# Patient Record
Sex: FEMALE | Race: WHITE | NOT HISPANIC OR LATINO | Employment: OTHER | URBAN - METROPOLITAN AREA
[De-identification: names, ages, dates, MRNs, and addresses within clinical notes are randomized per-mention and may not be internally consistent; named-entity substitution may affect disease eponyms.]

---

## 2020-04-21 PROBLEM — K64.8 OTHER HEMORRHOIDS: Status: ACTIVE | Noted: 2020-04-21

## 2020-05-05 ENCOUNTER — TELEPHONE (OUTPATIENT)
Dept: SCHEDULING | Facility: IMAGING CENTER | Age: 75
End: 2020-05-05

## 2020-05-07 ENCOUNTER — OFFICE VISIT (OUTPATIENT)
Dept: MEDICAL GROUP | Facility: PHYSICIAN GROUP | Age: 75
End: 2020-05-07
Payer: MEDICARE

## 2020-05-07 ENCOUNTER — HOSPITAL ENCOUNTER (OUTPATIENT)
Dept: CARDIOLOGY | Facility: MEDICAL CENTER | Age: 75
End: 2020-05-07
Attending: NURSE PRACTITIONER
Payer: MEDICARE

## 2020-05-07 ENCOUNTER — HOSPITAL ENCOUNTER (OUTPATIENT)
Dept: LAB | Facility: MEDICAL CENTER | Age: 75
End: 2020-05-07
Attending: NURSE PRACTITIONER
Payer: MEDICARE

## 2020-05-07 VITALS
RESPIRATION RATE: 16 BRPM | HEIGHT: 67 IN | OXYGEN SATURATION: 95 % | TEMPERATURE: 98.8 F | WEIGHT: 135.3 LBS | DIASTOLIC BLOOD PRESSURE: 62 MMHG | HEART RATE: 85 BPM | SYSTOLIC BLOOD PRESSURE: 116 MMHG | BODY MASS INDEX: 21.24 KG/M2

## 2020-05-07 DIAGNOSIS — Z91.199 NONCOMPLIANCE WITH TREATMENT PLAN: ICD-10-CM

## 2020-05-07 DIAGNOSIS — I48.91 ATRIAL FIBRILLATION, UNSPECIFIED TYPE (HCC): ICD-10-CM

## 2020-05-07 DIAGNOSIS — F41.8 ANXIETY ABOUT HEALTH: ICD-10-CM

## 2020-05-07 DIAGNOSIS — E55.9 VITAMIN D DEFICIENCY: ICD-10-CM

## 2020-05-07 DIAGNOSIS — E78.5 DYSLIPIDEMIA: ICD-10-CM

## 2020-05-07 DIAGNOSIS — I34.1 MITRAL PROLAPSE: ICD-10-CM

## 2020-05-07 DIAGNOSIS — K64.8 OTHER HEMORRHOIDS: ICD-10-CM

## 2020-05-07 DIAGNOSIS — R11.0 NAUSEA: ICD-10-CM

## 2020-05-07 DIAGNOSIS — R73.09 ELEVATED HEMOGLOBIN A1C: ICD-10-CM

## 2020-05-07 LAB
25(OH)D3 SERPL-MCNC: 31 NG/ML (ref 30–100)
ALBUMIN SERPL BCP-MCNC: 4.6 G/DL (ref 3.2–4.9)
ALBUMIN/GLOB SERPL: 1.7 G/DL
ALP SERPL-CCNC: 74 U/L (ref 30–99)
ALT SERPL-CCNC: 17 U/L (ref 2–50)
ANION GAP SERPL CALC-SCNC: 13 MMOL/L (ref 7–16)
AST SERPL-CCNC: 18 U/L (ref 12–45)
BASOPHILS # BLD AUTO: 0.6 % (ref 0–1.8)
BASOPHILS # BLD: 0.04 K/UL (ref 0–0.12)
BILIRUB SERPL-MCNC: 0.7 MG/DL (ref 0.1–1.5)
BUN SERPL-MCNC: 8 MG/DL (ref 8–22)
CALCIUM SERPL-MCNC: 9.5 MG/DL (ref 8.5–10.5)
CHLORIDE SERPL-SCNC: 100 MMOL/L (ref 96–112)
CHOLEST SERPL-MCNC: 213 MG/DL (ref 100–199)
CO2 SERPL-SCNC: 23 MMOL/L (ref 20–33)
CREAT SERPL-MCNC: 0.8 MG/DL (ref 0.5–1.4)
EOSINOPHIL # BLD AUTO: 0.03 K/UL (ref 0–0.51)
EOSINOPHIL NFR BLD: 0.4 % (ref 0–6.9)
ERYTHROCYTE [DISTWIDTH] IN BLOOD BY AUTOMATED COUNT: 42.6 FL (ref 35.9–50)
EST. AVERAGE GLUCOSE BLD GHB EST-MCNC: 111 MG/DL
GLOBULIN SER CALC-MCNC: 2.7 G/DL (ref 1.9–3.5)
GLUCOSE SERPL-MCNC: 97 MG/DL (ref 65–99)
HBA1C MFR BLD: 5.5 % (ref 0–5.6)
HCT VFR BLD AUTO: 45.9 % (ref 37–47)
HDLC SERPL-MCNC: 61 MG/DL
HGB BLD-MCNC: 14.9 G/DL (ref 12–16)
IMM GRANULOCYTES # BLD AUTO: 0.01 K/UL (ref 0–0.11)
IMM GRANULOCYTES NFR BLD AUTO: 0.1 % (ref 0–0.9)
LDLC SERPL CALC-MCNC: 133 MG/DL
LV EJECT FRACT  99904: 65
LV EJECT FRACT MOD 2C 99903: 74.13
LV EJECT FRACT MOD 4C 99902: 67.74
LV EJECT FRACT MOD BP 99901: 69.86
LYMPHOCYTES # BLD AUTO: 1.45 K/UL (ref 1–4.8)
LYMPHOCYTES NFR BLD: 21 % (ref 22–41)
MCH RBC QN AUTO: 29.4 PG (ref 27–33)
MCHC RBC AUTO-ENTMCNC: 32.5 G/DL (ref 33.6–35)
MCV RBC AUTO: 90.7 FL (ref 81.4–97.8)
MONOCYTES # BLD AUTO: 0.35 K/UL (ref 0–0.85)
MONOCYTES NFR BLD AUTO: 5.1 % (ref 0–13.4)
NEUTROPHILS # BLD AUTO: 5.02 K/UL (ref 2–7.15)
NEUTROPHILS NFR BLD: 72.8 % (ref 44–72)
NRBC # BLD AUTO: 0 K/UL
NRBC BLD-RTO: 0 /100 WBC
PLATELET # BLD AUTO: 152 K/UL (ref 164–446)
PMV BLD AUTO: 13.5 FL (ref 9–12.9)
POTASSIUM SERPL-SCNC: 3.7 MMOL/L (ref 3.6–5.5)
PROT SERPL-MCNC: 7.3 G/DL (ref 6–8.2)
RBC # BLD AUTO: 5.06 M/UL (ref 4.2–5.4)
SODIUM SERPL-SCNC: 136 MMOL/L (ref 135–145)
T4 FREE SERPL-MCNC: 1.51 NG/DL (ref 0.93–1.7)
TRIGL SERPL-MCNC: 97 MG/DL (ref 0–149)
TSH SERPL DL<=0.005 MIU/L-ACNC: 3.49 UIU/ML (ref 0.38–5.33)
WBC # BLD AUTO: 6.9 K/UL (ref 4.8–10.8)

## 2020-05-07 PROCEDURE — 83036 HEMOGLOBIN GLYCOSYLATED A1C: CPT | Mod: GA

## 2020-05-07 PROCEDURE — 84443 ASSAY THYROID STIM HORMONE: CPT

## 2020-05-07 PROCEDURE — 82306 VITAMIN D 25 HYDROXY: CPT

## 2020-05-07 PROCEDURE — 80053 COMPREHEN METABOLIC PANEL: CPT

## 2020-05-07 PROCEDURE — 80061 LIPID PANEL: CPT

## 2020-05-07 PROCEDURE — 84439 ASSAY OF FREE THYROXINE: CPT

## 2020-05-07 PROCEDURE — 93306 TTE W/DOPPLER COMPLETE: CPT

## 2020-05-07 PROCEDURE — 36415 COLL VENOUS BLD VENIPUNCTURE: CPT

## 2020-05-07 PROCEDURE — 99205 OFFICE O/P NEW HI 60 MIN: CPT | Performed by: NURSE PRACTITIONER

## 2020-05-07 PROCEDURE — 85025 COMPLETE CBC W/AUTO DIFF WBC: CPT

## 2020-05-07 PROCEDURE — 93306 TTE W/DOPPLER COMPLETE: CPT | Mod: 26 | Performed by: INTERNAL MEDICINE

## 2020-05-07 ASSESSMENT — PATIENT HEALTH QUESTIONNAIRE - PHQ9: CLINICAL INTERPRETATION OF PHQ2 SCORE: 0

## 2020-05-08 ENCOUNTER — TELEPHONE (OUTPATIENT)
Dept: MEDICAL GROUP | Facility: PHYSICIAN GROUP | Age: 75
End: 2020-05-08

## 2020-05-08 NOTE — TELEPHONE ENCOUNTER
PT called and stated that cardiology wouldn't let them both go in for the appointment so they chose to schedule a different appointment, she would like you to call her back to discuss this.

## 2020-05-08 NOTE — ASSESSMENT & PLAN NOTE
New pt. and  report previous noncompliance with medical treatment plans.  No PCP prior to today, no previous maintenance screening per patient choice.  Not taking any medications or supplements.

## 2020-05-08 NOTE — ASSESSMENT & PLAN NOTE
New to me.  Pleasant 75-year-old female with complicated medical history and main concern of persistent nausea x10 days, no vomiting.  Associated symptoms are weakness and she has not been eating much in the last week.  However tolerating fluids well. She denies CP, palpitations, dyspnea, coughing, wheezing, dizziness, ankle and feet swelling.  On 4/21/20 evaluation for external hemorrhoids, Hx of constipation, resolved now due to increase in OTC fiber and daily liquid intake.  She admits to frequent flatulence. Denies rectal bleeding, abd. distention, tenderness. She is here with  to help with history.  Pt is a poor historian, anxious today as she avoids medical offices and won't go to the office unless she absolutely has to. Underlined stress and anxiety since loss of grandchild. Good partner and family support. No PCP in the last 6 years as patient states she had no issues. She had no maintenance screening done by choice.   reports pt's initial diagnosis of mitral prolapse and atrial fibrillation x6 yrs ago, and recent cardiac evaluation in November 2019 by [facility] cardiology.  Patient refused to take Rx'd medications as she read Side Effects list online made her cautious.  She has not followed up with cardiology and was managing her dx with healthy diet and stress control.  Patient declines providing the name of a cardiologist for records request, as she does not want to hurt his feelings, however would like a new doctor if she needs to see one.   Vitals today VSS.  EKG in the office reveals sinus rhythm rate of 77, normal P axis normal.  , QRS 91, QT is 397 QTc is 450.   She denies Fever, chills, unplanned wt. Loss.

## 2020-05-12 NOTE — PROGRESS NOTES
Subjective:   Chief Complaint:   Chief Complaint   Patient presents with   • Atrial Fibrillation       This office visit is taking place via video conference in the setting of the COVID-19 pandemic.    Frances Sanchez is a 75 y.o. female who is referred by JOSE A Lara for atrial fibrillation, mitral valve prolapse with moderate to severe mitral regurgitation.    A few weeks ago, was having daily nausea, getting better.  Notes her pulse in her ear, throat and sternum is irregular at times.  Has palpitations at times, not clear if afib, feels like is it beating forcefully, fast, then slow, lasts a few hours, then stop. Has not happened for the past 2 months.  Arms get tingling, face tingles.    Has had heart murmur, was told would need surgery at some point.    Previously had paroxysmal atrial fibrillation around 2014 at [facility], last seen by them November 2019. Dr. Samayoa, wore a monitor in 2019. She recalls  Was concerned about taking recommended medications.  Chads 2 vascular score of 2, 2, previously suggested Xarelto, but then was on ASA, is not taking it until 3 days ago, takes with milk, did not tolerate  Has had tinnitus, not new, not related to ASA.    Echocardiogram 5/7/2020 with prolapse of the posterior leaflet with moderate to severe mitral regurgitation, RVSP 35.    Has hyperlipidemia, .  Has vitamin D deficiency, back in normal range.  Has had elevated A1c in the past but not recent.    She is not limited by chest pain, pressure or tightness with activity.   She can walk up/down stairs and walk one block, feels mild DAWKINS.  No orthopnea or lower extremity swelling.   Some mild lightheadedness.   No presyncope/syncope.   No symptoms of leg claudication.   No stroke/TIA like symptoms.    No family history of premature coronary artery disease.  No prior smoking history.  No history of diabetes.  No history of hypertension. BP up today, she is nervious, prior BP has been ok (reviewed by me)  No  history of autoimmune disease such as lupus or rheumatoid arthritis.  No chronic kidney disease.    Here with her , Aleksandar.    DATA REVIEWED by me:  ECG 5/7/2020  Sinus, 77, borderline anterior ST depression    Echo 5/7/2020  No prior study is available for comparison.   Left ventricular ejection fraction is visually estimated to be 65%.  Normal regional wall motion.  Prolapse of the posterior mitral leaflet was present.  Moderate appearing, likely severe anterior directed  mitral   regurgitation.  Estimated right ventricular systolic pressure  is 35 mmHg.    Most recent labs:       Lab Results   Component Value Date/Time    HEMOGLOBIN 14.9 05/07/2020 11:55 AM    HEMATOCRIT 45.9 05/07/2020 11:55 AM    MCV 90.7 05/07/2020 11:55 AM      Lab Results   Component Value Date/Time    SODIUM 136 05/07/2020 11:55 AM    POTASSIUM 3.7 05/07/2020 11:55 AM    CHLORIDE 100 05/07/2020 11:55 AM    CO2 23 05/07/2020 11:55 AM    GLUCOSE 97 05/07/2020 11:55 AM    BUN 8 05/07/2020 11:55 AM    CREATININE 0.80 05/07/2020 11:55 AM      Lab Results   Component Value Date/Time    ASTSGOT 18 05/07/2020 11:55 AM    ALTSGPT 17 05/07/2020 11:55 AM    ALBUMIN 4.6 05/07/2020 11:55 AM      Lab Results   Component Value Date/Time    CHOLSTRLTOT 213 (H) 05/07/2020 11:55 AM     (H) 05/07/2020 11:55 AM    HDL 61 05/07/2020 11:55 AM    TRIGLYCERIDE 97 05/07/2020 11:55 AM           Past Medical History:   Diagnosis Date   • Heart murmur 2019    mitral valve prolapse     History reviewed. No pertinent surgical history.  Family History   Problem Relation Age of Onset   • Alzheimer's Disease Father    • Diabetes Maternal Grandmother      Social History     Socioeconomic History   • Marital status:      Spouse name: Not on file   • Number of children: Not on file   • Years of education: Not on file   • Highest education level: Not on file   Occupational History   • Not on file   Social Needs   • Financial resource strain: Not on file   •  "Food insecurity     Worry: Not on file     Inability: Not on file   • Transportation needs     Medical: Not on file     Non-medical: Not on file   Tobacco Use   • Smoking status: Never Smoker   • Smokeless tobacco: Never Used   Substance and Sexual Activity   • Alcohol use: No   • Drug use: Never   • Sexual activity: Yes     Partners: Male   Lifestyle   • Physical activity     Days per week: Not on file     Minutes per session: Not on file   • Stress: Not on file   Relationships   • Social connections     Talks on phone: Not on file     Gets together: Not on file     Attends [employer] service: Not on file     Active member of club or organization: Not on file     Attends meetings of clubs or organizations: Not on file     Relationship status: Not on file   • Intimate partner violence     Fear of current or ex partner: Not on file     Emotionally abused: Not on file     Physically abused: Not on file     Forced sexual activity: Not on file   Other Topics Concern   • Not on file   Social History Narrative   • Not on file     Allergies   Allergen Reactions   • Keflex      Pt unsure of reaction       Current Outpatient Medications   Medication Sig Dispense Refill   • furosemide (LASIX) 20 MG Tab Take 1 Tab by mouth every day. 30 Tab 11   • potassium chloride (KLOR-CON) 8 MEQ tablet Take 1 Tab by mouth 2 times a day. 30 Tab 11     No current facility-administered medications for this visit.        ROS  All others systems reviewed and negative.     Objective:     /84 (BP Location: Left arm, Patient Position: Sitting)   Pulse 84   Ht 1.702 m (5' 7\")   Wt 61.2 kg (135 lb)  Body mass index is 21.14 kg/m².    This encounter was conducted via Zoom.  Verbal consent was obtained. Patient's identity was verified.    Vitals obtained by patient:    Physical Exam:  General: No acute distress. Well nourished.   HEENT: EOM grossly intact, no scleral icterus, no pharyngeal erythema.   Neck:  No JVD noted at 90 degrees, trachea " midline  CVS: Pulse as reported by patient, no visible LE edema.  Resp: Unlabored respiratory effort, no cough or audible wheeze  MSK/Ext: No clubbing or cyanosis visible appreciated.  Skin: No rashes in visible areas.  Neurological: CN III-XII grossly intact. No focal deficits.   Psych: A&O x 3, appropriate affect, good judgement, well groomed        Assessment:     1. Paroxysmal atrial fibrillation (HCC)  Basic Metabolic Panel   2. Mitral prolapse  REFERRAL TO CARDIOTHORACIC SURGERY   3. Non-rheumatic mitral regurgitation  REFERRAL TO CARDIOTHORACIC SURGERY    Basic Metabolic Panel   4. Pure hypercholesterolemia     5. Other secondary pulmonary hypertension (HCC)     6. DAWKINS (dyspnea on exertion)     7. Palpitations     8. Lightheaded         Medical Decision Making:  Today's Assessment / Status / Plan:     -Having symptomatic afib, lasts a few hours. We discussed metoprolol and other options  -Anticipate eventual need for diuretics with moderate to severe mitral regurgitation, she would rather discuss surgery first at this time, does have DAWKINS, no alarming features on her echo  -Chads 2 vascular score is 2 at this time, aspirin 162 mg at this time  -Discussed s/sx of ACS.  -Monitor blood pressure, I do not think it is typically elevated  -Nausea could be related but not clear, see below  -Eleveted LDL, no statin at this time  -RTC 4 weeks    Written instructions given today:      -Medication like metoprolol can suppress the palpitations and make the afib burden lessen.  -[id number] score 2, annual risk of stroke is 2.2%,  mg daily for now, eventually when your score is 3, probably when you turn 77, I will recommend full blood thinner such as Xarelto.  Your risk of stroke is never zero. Please know the signs of stroke, it is a 911 emergency.    -When your mitral valve leaks severely and the right heart pressures start to climb, and the heart pump function drops below 65%, it will be time to have a repair.  We  try diuretics first to delay surgery.  -If you are wondering if the mitral regurgitation is a cause of your nausea then trying lasix 20 mg daily with potassium 8 meq daily to reduce the amount of blood volume would be the best way to correlate if they are related.  -Start taking furosmedie in the morning of afternoon, lasts 6 hours, if you feel worse, dizzy, lightheaded, fatigue, stop the medication and call me.  -Write down your weight day, if you lose more than 2-3 pounds over 2 weeks, call me  -non fasting blood work in 5 days after starting, any [facility] lab  -If you think the palpitations and afib are related, taking low dose metoprolol is the best way to see if this will improve.    -You would be a candidate for mitral valve repair, we can refer you to Dr. Jeronimo Bryan at [facility] and other locations per your request.    Return in about 4 weeks (around 6/10/2020).    It is my pleasure to participate in the care of Ms. Sanchez.  Please do not hesitate to contact me with questions or concerns.    Roshni Gimenez MD, [facility]  Cardiologist [facility]    Please note that this dictation was created using voice recognition software. I have made every reasonable attempt to correct obvious errors, but it is possible there are errors of grammar and possibly content that I did not discover before finalizing the note.

## 2020-05-13 ENCOUNTER — TELEMEDICINE (OUTPATIENT)
Dept: CARDIOLOGY | Facility: MEDICAL CENTER | Age: 75
End: 2020-05-13
Payer: MEDICARE

## 2020-05-13 VITALS
SYSTOLIC BLOOD PRESSURE: 153 MMHG | BODY MASS INDEX: 21.19 KG/M2 | HEART RATE: 84 BPM | WEIGHT: 135 LBS | DIASTOLIC BLOOD PRESSURE: 84 MMHG | HEIGHT: 67 IN

## 2020-05-13 DIAGNOSIS — I27.29 OTHER SECONDARY PULMONARY HYPERTENSION (HCC): ICD-10-CM

## 2020-05-13 DIAGNOSIS — R06.09 DOE (DYSPNEA ON EXERTION): ICD-10-CM

## 2020-05-13 DIAGNOSIS — I34.1 MITRAL PROLAPSE: ICD-10-CM

## 2020-05-13 DIAGNOSIS — R42 LIGHTHEADED: ICD-10-CM

## 2020-05-13 DIAGNOSIS — I48.0 PAROXYSMAL ATRIAL FIBRILLATION (HCC): ICD-10-CM

## 2020-05-13 DIAGNOSIS — E78.00 PURE HYPERCHOLESTEROLEMIA: ICD-10-CM

## 2020-05-13 DIAGNOSIS — R00.2 PALPITATIONS: ICD-10-CM

## 2020-05-13 DIAGNOSIS — I34.0 NON-RHEUMATIC MITRAL REGURGITATION: ICD-10-CM

## 2020-05-13 PROCEDURE — 99214 OFFICE O/P EST MOD 30 MIN: CPT | Mod: 95,CR | Performed by: INTERNAL MEDICINE

## 2020-05-13 RX ORDER — POTASSIUM CHLORIDE 600 MG/1
8 TABLET, FILM COATED, EXTENDED RELEASE ORAL 2 TIMES DAILY
Qty: 30 TAB | Refills: 11 | Status: SHIPPED | OUTPATIENT
Start: 2020-05-13 | End: 2020-05-22

## 2020-05-13 RX ORDER — FUROSEMIDE 20 MG/1
20 TABLET ORAL DAILY
Qty: 30 TAB | Refills: 11 | Status: SHIPPED | OUTPATIENT
Start: 2020-05-13 | End: 2020-05-22

## 2020-05-13 ASSESSMENT — FIBROSIS 4 INDEX: FIB4 SCORE: 2.1

## 2020-05-13 NOTE — LETTER
[facility]   [address]  [place] [address]  Phone: [phone]  Fax: [phone]              Frances Sanchez  1945    Encounter Date: 5/13/2020    Roshni Gimenez M.D.          PROGRESS NOTE:  Subjective:   Chief Complaint:   Chief Complaint   Patient presents with   • Atrial Fibrillation       This office visit is taking place via video conference in the setting of the COVID-19 pandemic.    Frances Sanchez is a 75 y.o. female who is referred by JOSE A Lara for atrial fibrillation, mitral valve prolapse with moderate to severe mitral regurgitation.    A few weeks ago, was having daily nausea, getting better.  Notes her pulse in her ear, throat and sternum is irregular at times.  Has palpitations at times, not clear if afib, feels like is it beating forcefully, fast, then slow, lasts a few hours, then stop. Has not happened for the past 2 months.  Arms get tingling, face tingles.    Has had heart murmur, was told would need surgery at some point.    Previously had paroxysmal atrial fibrillation around 2014 at [facility], last seen by them November 2019. Dr. Samayoa, wore a monitor in 2019. She recalls  Was concerned about taking recommended medications.  Chads 2 vascular score of 2, 2, previously suggested Xarelto, but then was on ASA, is not taking it until 3 days ago, takes with milk, did not tolerate  Has had tinnitus, not new, not related to ASA.    Echocardiogram 5/7/2020 with prolapse of the posterior leaflet with moderate to severe mitral regurgitation, RVSP 35.    Has hyperlipidemia, .  Has vitamin D deficiency, back in normal range.  Has had elevated A1c in the past but not recent.    She is not limited by chest pain, pressure or tightness with activity.   She can walk up/down stairs and walk one block, feels mild DAWKINS.  No orthopnea or lower extremity swelling.   Some mild lightheadedness.   No presyncope/syncope.   No symptoms of  leg claudication.   No stroke/TIA like symptoms.    No family history of premature coronary artery disease.  No prior smoking history.  No history of diabetes.  No history of hypertension. BP up today, she is nervious, prior BP has been ok (reviewed by me)  No history of autoimmune disease such as lupus or rheumatoid arthritis.  No chronic kidney disease.    Here with her , Aleksandar.    DATA REVIEWED by me:  ECG 5/7/2020  Sinus, 77, borderline anterior ST depression    Echo 5/7/2020  No prior study is available for comparison.   Left ventricular ejection fraction is visually estimated to be 65%.  Normal regional wall motion.  Prolapse of the posterior mitral leaflet was present.  Moderate appearing, likely severe anterior directed  mitral   regurgitation.  Estimated right ventricular systolic pressure  is 35 mmHg.    Most recent labs:       Lab Results   Component Value Date/Time    HEMOGLOBIN 14.9 05/07/2020 11:55 AM    HEMATOCRIT 45.9 05/07/2020 11:55 AM    MCV 90.7 05/07/2020 11:55 AM      Lab Results   Component Value Date/Time    SODIUM 136 05/07/2020 11:55 AM    POTASSIUM 3.7 05/07/2020 11:55 AM    CHLORIDE 100 05/07/2020 11:55 AM    CO2 23 05/07/2020 11:55 AM    GLUCOSE 97 05/07/2020 11:55 AM    BUN 8 05/07/2020 11:55 AM    CREATININE 0.80 05/07/2020 11:55 AM      Lab Results   Component Value Date/Time    ASTSGOT 18 05/07/2020 11:55 AM    ALTSGPT 17 05/07/2020 11:55 AM    ALBUMIN 4.6 05/07/2020 11:55 AM      Lab Results   Component Value Date/Time    CHOLSTRLTOT 213 (H) 05/07/2020 11:55 AM     (H) 05/07/2020 11:55 AM    HDL 61 05/07/2020 11:55 AM    TRIGLYCERIDE 97 05/07/2020 11:55 AM           Past Medical History:   Diagnosis Date   • Heart murmur 2019    mitral valve prolapse     History reviewed. No pertinent surgical history.  Family History   Problem Relation Age of Onset   • Alzheimer's Disease Father    • Diabetes Maternal Grandmother      Social History     Socioeconomic History   •  "Marital status:      Spouse name: Not on file   • Number of children: Not on file   • Years of education: Not on file   • Highest education level: Not on file   Occupational History   • Not on file   Social Needs   • Financial resource strain: Not on file   • Food insecurity     Worry: Not on file     Inability: Not on file   • Transportation needs     Medical: Not on file     Non-medical: Not on file   Tobacco Use   • Smoking status: Never Smoker   • Smokeless tobacco: Never Used   Substance and Sexual Activity   • Alcohol use: No   • Drug use: Never   • Sexual activity: Yes     Partners: Male   Lifestyle   • Physical activity     Days per week: Not on file     Minutes per session: Not on file   • Stress: Not on file   Relationships   • Social connections     Talks on phone: Not on file     Gets together: Not on file     Attends [employer] service: Not on file     Active member of club or organization: Not on file     Attends meetings of clubs or organizations: Not on file     Relationship status: Not on file   • Intimate partner violence     Fear of current or ex partner: Not on file     Emotionally abused: Not on file     Physically abused: Not on file     Forced sexual activity: Not on file   Other Topics Concern   • Not on file   Social History Narrative   • Not on file     Allergies   Allergen Reactions   • Keflex      Pt unsure of reaction       No current outpatient medications on file.     No current facility-administered medications for this visit.        ROS  All others systems reviewed and negative.     Objective:     /84 (BP Location: Left arm, Patient Position: Sitting)   Pulse 84   Ht 1.702 m (5' 7\")   Wt 61.2 kg (135 lb)  Body mass index is 21.14 kg/m².    This encounter was conducted via Zoom.  Verbal consent was obtained. Patient's identity was verified.    Vitals obtained by patient:    Physical Exam:  General: No acute distress. Well nourished.   HEENT: EOM grossly intact, no " scleral icterus, no pharyngeal erythema.   Neck:  No JVD noted at 90 degrees, trachea midline  CVS: Pulse as reported by patient, no visible LE edema.  Resp: Unlabored respiratory effort, no cough or audible wheeze  MSK/Ext: No clubbing or cyanosis visible appreciated.  Skin: No rashes in visible areas.  Neurological: CN III-XII grossly intact. No focal deficits.   Psych: A&O x 3, appropriate affect, good judgement, well groomed        Assessment:     1. Paroxysmal atrial fibrillation (HCC)     2. Mitral prolapse  REFERRAL TO CARDIOTHORACIC SURGERY   3. Non-rheumatic mitral regurgitation  REFERRAL TO CARDIOTHORACIC SURGERY   4. Pure hypercholesterolemia     5. Other secondary pulmonary hypertension (HCC)     6. DAWKINS (dyspnea on exertion)     7. Palpitations     8. Lightheaded         Medical Decision Making:  Today's Assessment / Status / Plan:     -Having symptomatic afib, lasts a few hours. We discussed metoprolol and other options  -Anticipate eventual need for diuretics with moderate to severe mitral regurgitation, she would rather discuss surgery first at this time, does have DAWKINS, no alarming features on her echo  -Chads 2 vascular score is 2 at this time, aspirin 162 mg at this time  -Discussed s/sx of ACS.  -Monitor blood pressure, I do not think it is typically elevated  -Nausea could be related but not clear, see below  -Eleveted LDL, no statin at this time  -RTC 4 weeks    Written instructions given today:      -Medication like metoprolol can suppress the palpitations and make the afib burden lessen.  -[id number] score 2, annual risk of stroke is 2.2%,  mg daily for now, eventually when your score is 3, probably when you turn 77, I will recommend full blood thinner such as Xarelto.  Your risk of stroke is never zero. Please know the signs of stroke, it is a 911 emergency.    -When your mitral valve leaks severely and the right heart pressures start to climb, and the heart pump function drops below  65%, it will be time to have a repair.  We try diuretics first to delay surgery.  -If you are wondering if the mitral regurgitation is a cause of your nausea then trying lasix 20 mg daily with potassium 8 meq daily to reduce the amount of blood volume would be the best way to correlate if they are related.  -Start taking furosmedie in the morning of afternoon, lasts 6 hours, if you feel worse, dizzy, lightheaded, fatigue, stop the medication and call me.  -Write down your weight day, if you lose more than 2-3 pounds over 2 weeks, call me  -non fasting blood work in 5 days after starting, any [facility] lab  -If you think the palpitations and afib are related, taking low dose metoprolol is the best way to see if this will improve.    -You would be a candidate for mitral valve repair, we can refer you to Dr. Jeronimo Bryan at [facility] and other locations per your request.    Return in about 4 weeks (around 6/10/2020).    It is my pleasure to participate in the care of Ms. Sanchez.  Please do not hesitate to contact me with questions or concerns.    Roshni Gimenez MD, [facility]  Cardiologist [facility]    Please note that this dictation was created using voice recognition software. I have made every reasonable attempt to correct obvious errors, but it is possible there are errors of grammar and possibly content that I did not discover before finalizing the note.      PARK Baez  [address]  [place] [address]  VIA In Basket     Jeronimo Bryan M.D.  [address]  [address]  [place] [address]  VIA In Basket

## 2020-05-13 NOTE — PATIENT INSTRUCTIONS
-Medication like metoprolol can suppress the palpitations and make the afib burden lessen.  -FJMBX9incy score 2, annual risk of stroke is 2.2%,  mg daily for now, eventually when your score is 3, probably when you turn 77, I will recommend full blood thinner such as Xarelto.  Your risk of stroke is never zero. Please know the signs of stroke, it is a 911 emergency.    -When your mitral valve leaks severely and the right heart pressures start to climb, and the heart pump function drops below 65%, it will be time to have a repair.  We try diuretics first to delay surgery.  -If you are wondering if the mitral regurgitation is a cause of your nausea then trying lasix 20 mg daily with potassium 8 meq daily to reduce the amount of blood volume would be the best way to correlate if they are related.  -Start taking furosmedie in the morning of afternoon, lasts 6 hours, if you feel worse, dizzy, lightheaded, fatigue, stop the medication and call me.  -Write down your weight day, if you lose more than 2-3 pounds over 2 weeks, call me  -non fasting blood work in 5 days after starting, any [facility] lab  -If you think the palpitations and afib are related, taking low dose metoprolol is the best way to see if this will improve.    -You would be a candidate for mitral valve repair, we can refer you to Dr. Jeronimo Bryan at [facility] and other locations per your request.

## 2020-05-15 ENCOUNTER — OFFICE VISIT (OUTPATIENT)
Dept: MEDICAL GROUP | Facility: PHYSICIAN GROUP | Age: 75
End: 2020-05-15
Payer: MEDICARE

## 2020-05-15 VITALS
RESPIRATION RATE: 16 BRPM | HEIGHT: 67 IN | WEIGHT: 135 LBS | TEMPERATURE: 100.1 F | BODY MASS INDEX: 21.19 KG/M2 | HEART RATE: 92 BPM | OXYGEN SATURATION: 98 % | DIASTOLIC BLOOD PRESSURE: 74 MMHG | SYSTOLIC BLOOD PRESSURE: 132 MMHG

## 2020-05-15 DIAGNOSIS — E78.2 MIXED HYPERLIPIDEMIA: ICD-10-CM

## 2020-05-15 DIAGNOSIS — R11.0 NAUSEA: ICD-10-CM

## 2020-05-15 DIAGNOSIS — I34.1 MITRAL PROLAPSE: ICD-10-CM

## 2020-05-15 PROCEDURE — 99214 OFFICE O/P EST MOD 30 MIN: CPT | Performed by: NURSE PRACTITIONER

## 2020-05-15 ASSESSMENT — FIBROSIS 4 INDEX: FIB4 SCORE: 2.1

## 2020-05-15 NOTE — PROGRESS NOTES
REFERRING PHYSICIAN: Roshni Gimenez MD.    CONSULTING PHYSICIAN: Jeronimo Bryan MD, FACS.    CHIEF COMPLAINT: Shortness of breath.    HISTORY OF PRESENT ILLNESS: The patient is a 75 y.o. female with history of hyperlipidemia, atrial fibrillation and mitral valve prolapse with moderate to severe mitral regurgitation. Today she states she has had increasing shortness of breath for the last 6 to 7 months.  She also complains of nausea and ringing in her ears.  She tries to stay active and she can walk about 2 blocks before having to stop and rest.   She denies, chest pain, lower extremity edema, dizziness, syncope, orthopnea, or PND.      PAST MEDICAL HISTORY:   Active Ambulatory Problems     Diagnosis Date Noted   • Other hemorrhoids 04/21/2020   • Atrial fibrillation (HCC) 05/07/2020   • Nausea 05/07/2020   • Mitral prolapse 05/07/2020   • Mixed hyperlipidemia 05/15/2020     Resolved Ambulatory Problems     Diagnosis Date Noted   • No Resolved Ambulatory Problems     Past Medical History:   Diagnosis Date   • Arrhythmia    • Heart murmur 2019   • Hyperlipidemia    • HEATHER (obstructive sleep apnea)        PAST SURGICAL HISTORY:   Past Surgical History:   Procedure Laterality Date   • BLADDER NECK CONTRACTURE EXICISION          ALLERGIES:   Allergies   Allergen Reactions   • Keflex      Pt unsure of reaction        CURRENT MEDICATIONS:   Current Outpatient Medications:   •  aspirin EC (ECOTRIN) 81 MG Tablet Delayed Response, Take 81 mg by mouth every day., Disp: , Rfl:   •  furosemide (LASIX) 20 MG Tab, Take 1 Tab by mouth every day. (Patient not taking: Reported on 5/15/2020), Disp: 30 Tab, Rfl: 11  •  potassium chloride (KLOR-CON) 8 MEQ tablet, Take 1 Tab by mouth 2 times a day. (Patient not taking: Reported on 5/15/2020), Disp: 30 Tab, Rfl: 11    FAMILY HISTORY:   Family History   Problem Relation Age of Onset   • Lung Disease Mother    • Alzheimer's Disease Father    • Diabetes Maternal Grandmother         SOCIAL  HISTORY:   Social History     Socioeconomic History   • Marital status:      Spouse name: Not on file   • Number of children: Not on file   • Years of education: Not on file   • Highest education level: Not on file   Occupational History   • Not on file   Social Needs   • Financial resource strain: Not on file   • Food insecurity     Worry: Not on file     Inability: Not on file   • Transportation needs     Medical: Not on file     Non-medical: Not on file   Tobacco Use   • Smoking status: Never Smoker   • Smokeless tobacco: Never Used   Substance and Sexual Activity   • Alcohol use: No   • Drug use: Never   • Sexual activity: Yes     Partners: Male   Lifestyle   • Physical activity     Days per week: Not on file     Minutes per session: Not on file   • Stress: Not on file   Relationships   • Social connections     Talks on phone: Not on file     Gets together: Not on file     Attends Protestant service: Not on file     Active member of club or organization: Not on file     Attends meetings of clubs or organizations: Not on file     Relationship status: Not on file   • Intimate partner violence     Fear of current or ex partner: Not on file     Emotionally abused: Not on file     Physically abused: Not on file     Forced sexual activity: Not on file   Other Topics Concern   • Not on file   Social History Narrative   • Not on file       REVIEW OF SYSTEMS:  Review of Systems   Constitutional: Positive for malaise/fatigue.   HENT: Negative.    Eyes: Negative.    Respiratory: Positive for shortness of breath.    Cardiovascular: Negative.    Gastrointestinal: Positive for nausea.   Genitourinary: Negative.    Musculoskeletal: Negative.    Skin: Negative.    Neurological: Negative.    Endo/Heme/Allergies: Negative.    Psychiatric/Behavioral: Negative.        PHYSICAL EXAMINATION:    /78 (BP Location: Left arm, Patient Position: Sitting, BP Cuff Size: Adult)   Pulse 85   Temp 36.5 °C (97.7 °F) (Temporal)    "Ht 1.702 m (5' 7\")   Wt 61.2 kg (135 lb)   SpO2 99%   BMI 21.14 kg/m² .    Physical Exam   Constitutional: She is oriented to person, place, and time and well-developed, well-nourished, and in no distress. No distress.   HENT:   Head: Normocephalic.   Eyes: Pupils are equal, round, and reactive to light.   Neck: Neck supple.   Cardiovascular: Normal rate and regular rhythm. Exam reveals no gallop.   Murmur heard.   Systolic murmur is present with a grade of 2/6.  Pulmonary/Chest: Effort normal and breath sounds normal. No respiratory distress. She has no wheezes. She has no rales.   Abdominal: Soft. Bowel sounds are normal. She exhibits no distension. There is no abdominal tenderness.   Musculoskeletal: Normal range of motion.         General: No edema.   Neurological: She is alert and oriented to person, place, and time.   Skin: Skin is warm and dry.   Psychiatric: Mood, memory, affect and judgment normal.          LABS REVIEWED:  Lab Results   Component Value Date/Time    SODIUM 136 05/07/2020 11:55 AM    POTASSIUM 3.7 05/07/2020 11:55 AM    CHLORIDE 100 05/07/2020 11:55 AM    CO2 23 05/07/2020 11:55 AM    GLUCOSE 97 05/07/2020 11:55 AM    BUN 8 05/07/2020 11:55 AM    CREATININE 0.80 05/07/2020 11:55 AM      No results found for: PROTHROMBTM, INR   Lab Results   Component Value Date/Time    WBC 6.9 05/07/2020 11:55 AM    RBC 5.06 05/07/2020 11:55 AM    HEMOGLOBIN 14.9 05/07/2020 11:55 AM    HEMATOCRIT 45.9 05/07/2020 11:55 AM    MCV 90.7 05/07/2020 11:55 AM    MCH 29.4 05/07/2020 11:55 AM    MCHC 32.5 (L) 05/07/2020 11:55 AM    MPV 13.5 (H) 05/07/2020 11:55 AM    NEUTSPOLYS 72.80 (H) 05/07/2020 11:55 AM    LYMPHOCYTES 21.00 (L) 05/07/2020 11:55 AM    MONOCYTES 5.10 05/07/2020 11:55 AM    EOSINOPHILS 0.40 05/07/2020 11:55 AM    BASOPHILS 0.60 05/07/2020 11:55 AM        IMAGING REVIEWED AND INTERPRETED:    [place] [facility] [place]  Normal Left ventricle, EF 65%  Myxomatous (redundant) mitral valve. Prolapse " of the posterior mitral   leaflet was present. No mitral stenosis. Moderate appearing, likely   severe anterior directed  mitral regurgitation. ERO by PISA method is   0.3 sq cm. Vena contracta is 0.5 cm.    ANGIOGRAM: None.      IMPRESSION:  Severe symptomatic mitral regurgitation (3-4+, degenerative), myxomatous mitral valve, paroxysmal atrial fibrillation.      PLAN:  I recommend that she undergo mitral valve repair or replacement, left sided Maze procedure and intraoperative transesophageal echocardiography.    The procedure, its risks, benefits, potential complications and alternative treatments were discussed with the patient in detail including the risks should she decide not to undergo my recommended treatment. All of her questions were answered to her satisfaction and she is willing to proceed with the operation. The risks include death, stroke, infection: to include a rare bacterial infection related to the use of the heart/lung machine, madisyn-operative myocardial infarction, dysrhythmias, diaphragmatic paralysis, chest wall paresthesia, tracheostomy, kidney or other organ failure, possible return to the operating room for bleeding, bleeding requiring transfusion with its attendant risks including AIDS or hepatitis, dehiscence of surgical incisions, respiratory complications including the need for prolonged ventilator support, Protamine or other drug reaction, peripheral neuropathy, loss of limb, and miscount of surgical items. The operative mortality risk is approximately 1-2%. The STS mortality risk score is 1% and the morbidity and mortality risk score is 10% mitral valve repair. The STS mortality risk score is 1.8% and the morbidity and mortality risk score is 12% mitral valve replacement. The scores were discussed with patient.    The operation is scheduled for Wednesday, June 17, 2020 at 8:00 AM at [facility].  Cardiac catheterization will be performed prior to the  operation.    Findings and recommendations have been discussed with the patient’s cardiologist, Roshni Gimenez MD.  Thank you for this very challenging consultation and participation in the patient’s care.  I will keep you apprised of all future developments.      Sincerely,      Jeronimo Bryan MD, FACS.

## 2020-05-15 NOTE — ASSESSMENT & PLAN NOTE
Patient reports nausea resolved today.  She denies CP, palpitations, shortness of breath, NVD, no blood in stool or urine.  Family reports good hydration and good appetite.

## 2020-05-15 NOTE — PROGRESS NOTES
Chief Complaint   Patient presents with   • Follow-Up     echo       HISTORY OF PRESENT ILLNESS: Patient is a 75 y.o. female, established patient who presents today to discuss medical problems as listed below:    Health Maintenance:  COMPLETED.    Mixed hyperlipidemia  Mildly elevated LDL at 133.  Patient reports healthy diet and staying physically active.  BMI is within normal limits.  No DM, no hypertension, no CKD.  Does however have mitral prolapse and A. fib.  She is taking baby aspirin no other medication.  Patient prefers not to take any prescribed medications if she does not have to.  She denies any personal family history of MI or stroke.   Ref. Range 5/7/2020 11:55   Cholesterol,Tot Latest Ref Range: 100 - 199 mg/dL 213 (H)   Triglycerides Latest Ref Range: 0 - 149 mg/dL 97   HDL Latest Ref Range: >=40 mg/dL 61   LDL Latest Ref Range: <100 mg/dL 133 (H)   The 10-year ASCVD risk score (Mony SANTOS Jr., et al., 2013) is: 13.8%    Values used to calculate the score:      Age: 75 years      Sex: Female      Is Non- : No      Diabetic: No      Tobacco smoker: No      Systolic Blood Pressure: 132 mmHg      Is BP treated: No      HDL Cholesterol: 61 mg/dL      Total Cholesterol: 213 mg/dL    Nausea  Patient reports nausea resolved today.  She denies CP, palpitations, shortness of breath, NVD, no blood in stool or urine.  Family reports good hydration and good appetite.     Mitral prolapse  Viewed results of recent ECHO.  Patient and  also had an appointment 2 days ago to review these results with cardiology.  Patient's pain likely still have some questions and would like to have him follow-up with cardiology.      Patient Active Problem List    Diagnosis Date Noted   • Mixed hyperlipidemia 05/15/2020   • Atrial fibrillation (HCC) 05/07/2020   • Nausea 05/07/2020   • Mitral prolapse 05/07/2020   • Other hemorrhoids 04/21/2020        Allergies: Keflex    Current Outpatient Medications  "  Medication Sig Dispense Refill   • aspirin EC (ECOTRIN) 81 MG Tablet Delayed Response Take 81 mg by mouth every day.     • furosemide (LASIX) 20 MG Tab Take 1 Tab by mouth every day. (Patient not taking: Reported on 5/15/2020) 30 Tab 11   • potassium chloride (KLOR-CON) 8 MEQ tablet Take 1 Tab by mouth 2 times a day. (Patient not taking: Reported on 5/15/2020) 30 Tab 11     No current facility-administered medications for this visit.        Social History     Tobacco Use   • Smoking status: Never Smoker   • Smokeless tobacco: Never Used   Substance Use Topics   • Alcohol use: No   • Drug use: Never     Social History     Social History Narrative   • Not on file       Family History   Problem Relation Age of Onset   • Alzheimer's Disease Father    • Diabetes Maternal Grandmother        Allergies, past medical history, past surgical history, family history, social history reviewed and updated.    Review of Systems:     - Constitutional: Negative for fever, chills, unexpected weight change, and fatigue/generalized weakness.    - Respiratory: Negative for cough, sputum production, chest congestion, dyspnea, wheezing, and crackles.      - Cardiovascular: Negative for chest pain, palpitations, orthopnea, and bilateral lower extremity edema.     - Gastrointestinal: Negative for heartburn, nausea, vomiting, abdominal pain, hematochezia, melena, diarrhea, constipation, and greasy/foul-smelling stools.     - Psychiatric/Behavioral: Negative for depression, suicidal/homicidal ideation and memory loss.      All other systems reviewed and are negative    Exam:    /74 (BP Location: Left arm, Patient Position: Sitting)   Pulse 92   Temp 37.8 °C (100.1 °F) (Tympanic)   Resp 16   Ht 1.702 m (5' 7\")   Wt 61.2 kg (135 lb)   SpO2 98%   BMI 21.14 kg/m²  Body mass index is 21.14 kg/m².    Physical Exam:  Constitutional: Well-developed and well-nourished. Not diaphoretic. No distress.   Cardiovascular: irreg irreg rate and " rhythm, S1 and S2 with murmur, rubs, or gallops.    Chest: Effort normal. Clear to auscultation throughout. No adventitious sounds. No CVA tenderness.  Abdomen: Soft, non tender, and without distention. Active bowel sounds in all four quadrants. No rebound, guarding, masses or HSM.  Neurological: Alert and oriented x 3.   Psychiatric:  Behavior, mood, and affect are appropriate.  MA/nursing note and vitals reviewed.    LABS: 5/7  results reviewed and discussed with the patient, questions answered.    Patient was seen for 25 minutes face to face of which > 50% of appointment time was spent on counseling and coordination of care regarding the above.     Assessment/Plan:  1. Mixed hyperlipidemia  Stable. Discussed healthy lifestyle.  Patient declines the need for any Rx medications.  Would like to continue managing with healthy lifestyle, diet and exercise.    2. Nausea  resolved     3.  Mitral prolapse  Patient to follow-up with cardiology for additional questions.  Contact numbers given.    Discussed with patient possible alternative diagnoses, pt is to take all medications as prescribed. If symptoms persist FU w/PCP, if symptoms worsen go to emergency room. If experiencing any side effects from prescribed medications reports to the office immediately or go to emergency room.  Reviewed indication, dosage, usage and potential adverse effects of prescribed medications. Reviewed risks and benefits of treatment plan. Patient verbalizes understanding of all instruction and verbally agrees to plan.    Return if symptoms worsen or fail to improve.

## 2020-05-15 NOTE — ASSESSMENT & PLAN NOTE
Viewed results of recent ECHO.  Patient and  also had an appointment 2 days ago to review these results with cardiology.  Patient's pain likely still have some questions and would like to have him follow-up with cardiology.

## 2020-05-15 NOTE — ASSESSMENT & PLAN NOTE
Mildly elevated LDL at 133.  Patient reports healthy diet and staying physically active.  BMI is within normal limits.  No DM, no hypertension, no CKD.  Does however have mitral prolapse and A. fib.  She is taking baby aspirin no other medication.  Patient prefers not to take any prescribed medications if she does not have to.  She denies any personal family history of MI or stroke.   Ref. Range 5/7/2020 11:55   Cholesterol,Tot Latest Ref Range: 100 - 199 mg/dL 213 (H)   Triglycerides Latest Ref Range: 0 - 149 mg/dL 97   HDL Latest Ref Range: >=40 mg/dL 61   LDL Latest Ref Range: <100 mg/dL 133 (H)   The 10-year ASCVD risk score (Monyjose l SANTOS Jr., et al., 2013) is: 13.8%    Values used to calculate the score:      Age: 75 years      Sex: Female      Is Non- : No      Diabetic: No      Tobacco smoker: No      Systolic Blood Pressure: 132 mmHg      Is BP treated: No      HDL Cholesterol: 61 mg/dL      Total Cholesterol: 213 mg/dL

## 2020-05-19 ENCOUNTER — HOSPITAL ENCOUNTER (OUTPATIENT)
Facility: MEDICAL CENTER | Age: 75
End: 2020-05-19
Attending: THORACIC SURGERY (CARDIOTHORACIC VASCULAR SURGERY)
Payer: MEDICARE

## 2020-05-19 ENCOUNTER — OFFICE VISIT (OUTPATIENT)
Dept: CARDIOTHORACIC SURGERY | Facility: MEDICAL CENTER | Age: 75
End: 2020-05-19
Payer: MEDICARE

## 2020-05-19 VITALS
OXYGEN SATURATION: 99 % | WEIGHT: 135 LBS | TEMPERATURE: 97.7 F | SYSTOLIC BLOOD PRESSURE: 118 MMHG | BODY MASS INDEX: 21.19 KG/M2 | DIASTOLIC BLOOD PRESSURE: 78 MMHG | HEIGHT: 67 IN | HEART RATE: 85 BPM

## 2020-05-19 DIAGNOSIS — I34.0 SEVERE MITRAL REGURGITATION: ICD-10-CM

## 2020-05-19 PROCEDURE — 87641 MR-STAPH DNA AMP PROBE: CPT

## 2020-05-19 PROCEDURE — 87640 STAPH A DNA AMP PROBE: CPT | Mod: XU

## 2020-05-19 PROCEDURE — 99205 OFFICE O/P NEW HI 60 MIN: CPT | Performed by: THORACIC SURGERY (CARDIOTHORACIC VASCULAR SURGERY)

## 2020-05-19 ASSESSMENT — ENCOUNTER SYMPTOMS
CARDIOVASCULAR NEGATIVE: 1
EYES NEGATIVE: 1
PSYCHIATRIC NEGATIVE: 1
NAUSEA: 1
MUSCULOSKELETAL NEGATIVE: 1
NEUROLOGICAL NEGATIVE: 1
SHORTNESS OF BREATH: 1

## 2020-05-19 ASSESSMENT — FIBROSIS 4 INDEX: FIB4 SCORE: 2.1

## 2020-05-19 NOTE — NON-PROVIDER
Problem: Prehabilitation    Goal: optimal education for cardiac prehabilitation to include: Incentive spirometry, diet, exercise, 5-meter walk test, smoking, alcohol & illicit drug screening and cessation education if needed, social/family support needs, and general post-surgical physical limitations.     Intervention: Prehabilitation education given to patient and  Aleksandar, all questions were answered.  My card and cardiac surgery FAQ sheet with IS instructions was provided for any additional questions before their pre-op appointment and after discharge.    Routine dental appointment prior to surgery is indicated for valve procedure, patient educated.     Discussed importance of incentive spirometry (IS) use, 10 times in the morning and at night AT MINIMUM but more often if possible to improve cardio-pulmonary function prior to surgery.  Prehabilitation IS baseline is 1000.    5 meter walk test was performed, the times were 4 5 5.       We discussed importance of preventing deconditioning and muscle wasting. Patient is minimally physically active; current exercise tolerance/level is moderate due to occasional SOB. Patient educated to start an exercise regimen of walking on a flat surface 4-6 times a day for a length as tolerated. Patient was educated that if chest pain or SOB occurs patient is to stop immediately and if symptoms do not resolve to call 911.    Left main disease -- unknown-cath pending  EF-- 65%     Patient does not have known CAD; education on cholesterol, diet, and the heart are not indicated at this time.  Patient's BMI is 21.  Education regarding portion sizing and diet are not indicated.    Patient denies smoking history.  Smoking risks and cessation education not indicated.     Patient denies alcohol abuse.  Alcohol risks and cessation education not indicated.    Patient denies illicit drug use.  Illicit drug use risks and cessation education not indicated.    Patient does have family or  friend to stay for 1-week post discharge to assist with ADL's    We discussed the basics of physical limitation post op to include:              No driving for 4 weeks              No lifting, pushing or pulling > 10 lbs for 6 weeks  Sternal precautions to include moving within the tube and safe mobility in and out of bed and the chair.    An appointment for pre op education was made for June 11th at 11:00 to go over specifics of surgery and in hospital patient recovery.

## 2020-05-19 NOTE — PATIENT INSTRUCTIONS
You have been scheduled for open heart surgery. A surgery scheduler will be calling you with a date for your preoperative testing.      Your surgery is scheduled for _____June 17, 2020____ at _____0800 am_____ at [facility]. (Times may change if emergency arises)       On ___June 16, 2020______ at _______________ check in at the [facility]; [address]. Park in the [address] [place] [address] Parking Garage or  Parking (located at the . [facility] Entrance) for [address], and proceed down the shahid to the door marked G-16.  TESTING WILL BEGIN AT____________. Please arrive 15 minutes before testing to complete paperwork.      Your preoperative testing may include noninvasive testing such as carotid duplex, vein mapping, blood work, urinalysis and chest x-ray. Lab work may not be done more than 72 hours before surgery. *It is ok to eat and drink before lab work*      You will also meet with the cardiac nurse navigator for preoperative teaching.      DO NOT EAT OR DRINK ANYTHING AFTER MIDNIGHT BEFORE SURGERY.       PLEASE STOP THE FOLLOWING MEDICATIONS ON THE TIMES STATED BELOW:                 STOP 1 DAYS PRIOR TO SURGERY: ACE INHIBITOR (lisinopril, enalapril, captopril, benazapril),      ANGIOTENSIN RECEPTOR BLOCKERS ( cozaar, diovan, benicar, micardis, avapro, atacand)    STOP 4 DAYS PRIOR TO SURGERY: ELIQUIS, XARELTO, PRADAXA, EFFIENT                 STOP 5 DAYS PRIOR TO SURGERY: COUMADIN, EXTRA STRENGTH ASPIRIN                 STOP 7 DAYS PRIOR TO SURGERY: PLAVIX, BRILANTA                 STOP IMMEDIATELY: FISH OIL, GLUCOSAMINE, VITAMIN E AND GINKO BILOBA       On the day of surgery check in at 5:00 am. Go to the [employer] [place] at [address] and park in the [facility] Garage. Take the garage elevator to “ground” and enter through the front doors. The check-in desk is in the front lobby as you enter the building and is clearly marked. Tell the  you are there to  check-in for heart surgery.       Should you have any additional questions call the office at [phone].

## 2020-05-20 ENCOUNTER — TELEPHONE (OUTPATIENT)
Dept: CARDIOLOGY | Facility: MEDICAL CENTER | Age: 75
End: 2020-05-20

## 2020-05-20 DIAGNOSIS — I34.0 SEVERE MITRAL REGURGITATION: ICD-10-CM

## 2020-05-20 LAB
AMBIGUOUS DTTM AMBI4: NORMAL
SCCMEC + MECA PNL NOSE NAA+PROBE: NEGATIVE
SCCMEC + MECA PNL NOSE NAA+PROBE: NEGATIVE
SIGNIFICANT IND 70042: NORMAL
SITE SITE: NORMAL
SOURCE SOURCE: NORMAL

## 2020-05-20 NOTE — TELEPHONE ENCOUNTER
Pt is scheduled on 5/26/2020 for a [facility] w/shot of Aortic Root with Dr. Paul.  PT to check in at 8:30am for a 10:30am procedure. PT instructed to hold lasix the morning of procedure. H & P done by Dr. Gimenez on 5/13/2020. Pre-admit to call pt.

## 2020-05-20 NOTE — TELEPHONE ENCOUNTER
Ellen,    Per Clarisa LAMBERT, this patient is scheduled for open heart on 6-17-20. She will need to be scheduled for a heart cath before that date.    Thank You,  Ernestina

## 2020-05-21 ENCOUNTER — TELEPHONE (OUTPATIENT)
Dept: CARDIOTHORACIC SURGERY | Facility: MEDICAL CENTER | Age: 75
End: 2020-05-21

## 2020-05-21 NOTE — TELEPHONE ENCOUNTER
"inés called states she has ringing in her ears and nausea.  I reviewed her chart and Dr. Bryan noted in his consultation that she has this ongoing issue.    She states she has a current script for zofran but was hesitant to take it stating she didn't like taking \"heavy drugs like that\" and has yet to try it.  She has no EKG to review and no documented heart block from our visit.    Her office visit from [facility] in [place] on 5/7 had an EKG that is documented QTc of 450.    I told her she may take her zofran as prescribed by her MD and see if it helps.    Call time 6 minutes.  "

## 2020-05-22 ENCOUNTER — TELEMEDICINE (OUTPATIENT)
Dept: CARDIOLOGY | Facility: MEDICAL CENTER | Age: 75
End: 2020-05-22
Payer: MEDICARE

## 2020-05-22 VITALS
WEIGHT: 135 LBS | SYSTOLIC BLOOD PRESSURE: 138 MMHG | DIASTOLIC BLOOD PRESSURE: 76 MMHG | HEART RATE: 87 BPM | HEIGHT: 67 IN | BODY MASS INDEX: 21.19 KG/M2

## 2020-05-22 DIAGNOSIS — R42 LIGHTHEADED: ICD-10-CM

## 2020-05-22 DIAGNOSIS — I34.0 NON-RHEUMATIC MITRAL REGURGITATION: ICD-10-CM

## 2020-05-22 DIAGNOSIS — I34.1 MITRAL PROLAPSE: ICD-10-CM

## 2020-05-22 DIAGNOSIS — E78.00 PURE HYPERCHOLESTEROLEMIA: ICD-10-CM

## 2020-05-22 DIAGNOSIS — R00.2 PALPITATIONS: ICD-10-CM

## 2020-05-22 DIAGNOSIS — R06.09 DOE (DYSPNEA ON EXERTION): ICD-10-CM

## 2020-05-22 DIAGNOSIS — I48.0 PAROXYSMAL ATRIAL FIBRILLATION (HCC): ICD-10-CM

## 2020-05-22 DIAGNOSIS — I27.29 OTHER SECONDARY PULMONARY HYPERTENSION (HCC): ICD-10-CM

## 2020-05-22 DIAGNOSIS — Z01.810 PRE-OPERATIVE CARDIOVASCULAR EXAMINATION: ICD-10-CM

## 2020-05-22 DIAGNOSIS — Z01.812 PRE-OPERATIVE LABORATORY EXAMINATION: ICD-10-CM

## 2020-05-22 LAB
ALBUMIN SERPL BCP-MCNC: 4.5 G/DL (ref 3.2–4.9)
ALBUMIN/GLOB SERPL: 1.8 G/DL
ALP SERPL-CCNC: 66 U/L (ref 30–99)
ALT SERPL-CCNC: 12 U/L (ref 2–50)
ANION GAP SERPL CALC-SCNC: 11 MMOL/L (ref 7–16)
APTT PPP: 26 SEC (ref 24.7–36)
AST SERPL-CCNC: 16 U/L (ref 12–45)
BILIRUB SERPL-MCNC: 0.5 MG/DL (ref 0.1–1.5)
BUN SERPL-MCNC: 13 MG/DL (ref 8–22)
CALCIUM SERPL-MCNC: 9.6 MG/DL (ref 8.5–10.5)
CHLORIDE SERPL-SCNC: 102 MMOL/L (ref 96–112)
CO2 SERPL-SCNC: 23 MMOL/L (ref 20–33)
CREAT SERPL-MCNC: 0.89 MG/DL (ref 0.5–1.4)
EKG IMPRESSION: NORMAL
ERYTHROCYTE [DISTWIDTH] IN BLOOD BY AUTOMATED COUNT: 44.5 FL (ref 35.9–50)
GLOBULIN SER CALC-MCNC: 2.5 G/DL (ref 1.9–3.5)
GLUCOSE SERPL-MCNC: 103 MG/DL (ref 65–99)
HCT VFR BLD AUTO: 43.5 % (ref 37–47)
HGB BLD-MCNC: 14.2 G/DL (ref 12–16)
INR PPP: 0.93 (ref 0.87–1.13)
MCH RBC QN AUTO: 29.7 PG (ref 27–33)
MCHC RBC AUTO-ENTMCNC: 32.6 G/DL (ref 33.6–35)
MCV RBC AUTO: 91 FL (ref 81.4–97.8)
PLATELET # BLD AUTO: 171 K/UL (ref 164–446)
PMV BLD AUTO: 13.4 FL (ref 9–12.9)
POTASSIUM SERPL-SCNC: 4.6 MMOL/L (ref 3.6–5.5)
PROT SERPL-MCNC: 7 G/DL (ref 6–8.2)
PROTHROMBIN TIME: 12.7 SEC (ref 12–14.6)
RBC # BLD AUTO: 4.78 M/UL (ref 4.2–5.4)
SODIUM SERPL-SCNC: 136 MMOL/L (ref 135–145)
WBC # BLD AUTO: 7.2 K/UL (ref 4.8–10.8)

## 2020-05-22 PROCEDURE — 93005 ELECTROCARDIOGRAM TRACING: CPT

## 2020-05-22 PROCEDURE — 85730 THROMBOPLASTIN TIME PARTIAL: CPT

## 2020-05-22 PROCEDURE — 85027 COMPLETE CBC AUTOMATED: CPT

## 2020-05-22 PROCEDURE — 80053 COMPREHEN METABOLIC PANEL: CPT

## 2020-05-22 PROCEDURE — 93010 ELECTROCARDIOGRAM REPORT: CPT | Performed by: INTERNAL MEDICINE

## 2020-05-22 PROCEDURE — 99214 OFFICE O/P EST MOD 30 MIN: CPT | Mod: 95,CR | Performed by: INTERNAL MEDICINE

## 2020-05-22 PROCEDURE — 36415 COLL VENOUS BLD VENIPUNCTURE: CPT

## 2020-05-22 PROCEDURE — 85610 PROTHROMBIN TIME: CPT

## 2020-05-22 ASSESSMENT — FIBROSIS 4 INDEX
FIB4 SCORE: 2.1
FIB4 SCORE: 2.1

## 2020-05-22 NOTE — PATIENT INSTRUCTIONS
-Left heart angiogram to look at the heart arteries to see if you need bypass surgery of the heart arteries during open heart sugery.    -The risks of left heart catheterization are very, very low, 0.1% chance of heart attack, emergent open heart surgery, stroke, kidney dysfunction and death on the table.  Risk and benefit will further be discussed by the interventionalist performing a left heart catheterization. There will not be intervention, no placement of stent or balloons.    -Dr. Bryan may want a BRIGID, transesophageal echocardiogram. We use deep sedation with propofol, pass a probe down the esophagus to see the mitral valve much better. Can help him prepare for the surgery.  I will to ask him if he wants this. 1 in 10,000 chance of damage to the esophagus. If he wants this, my office will order it and let you know.

## 2020-05-22 NOTE — PROGRESS NOTES
Subjective:   Chief Complaint:   Chief Complaint   Patient presents with   • Atrial Fibrillation       This office visit is taking place via video conference in the setting of the COVID-19 pandemic.    Frances Sanchez is a 75 y.o. female who is referred by JOSE A Lara for atrial fibrillation, mitral valve prolapse with moderate to severe mitral regurgitation.    A few weeks ago, was having daily nausea, getting better.  Notes her pulse in her ear, throat and sternum is irregular at times.  Has palpitations at times, not clear if afib, feels like is it beating forcefully, fast, then slow, lasts a few hours, then stop. Has not happened for the past 2 months.  Arms get tingling, face tingles.  Now wakes up with nausea, ringing in her ears.    Has had heart murmur, was told would need surgery at some point.    Previously had paroxysmal atrial fibrillation around 2014 at [facility], last seen by them November 2019. Dr. Samayoa, wore a monitor in 2019. She recalls  Was concerned about taking recommended medications.  Chads 2 vascular score of 2, 2, previously suggested Xarelto, but then was on ASA, is not taking it until 3 days ago, takes with milk, did not tolerate  Has had tinnitus, not new, not related to ASA.    Echocardiogram 5/7/2020 with prolapse of the posterior leaflet with moderate to severe mitral regurgitation, RVSP 35.    Talked to Dr. Bryan, will try repair or replacement with tissue valve and MAZE.    Has hyperlipidemia, .  Has vitamin D deficiency, back in normal range.  Has had elevated A1c in the past but not recent.    She is not limited by chest pain, pressure or tightness with activity.   She can walk up/down stairs and walk one block, feels mild DAWKINS.  No orthopnea or lower extremity swelling.   Some mild lightheadedness.   No presyncope/syncope.   No symptoms of leg claudication.   No stroke/TIA like symptoms.    No family history of premature coronary artery disease.  No prior smoking  history.  No history of diabetes.  No history of hypertension. BP up today, she is nervious, prior BP has been ok (reviewed by me)  No history of autoimmune disease such as lupus or rheumatoid arthritis.  No chronic kidney disease.    Here with her , Aleksandar.  They are recently .    DATA REVIEWED by me:  ECG 5/7/2020  Sinus, 77, borderline anterior ST depression    Echo 5/7/2020  No prior study is available for comparison.   Left ventricular ejection fraction is visually estimated to be 65%.  Normal regional wall motion.  The left atrium is normal in size.  Prolapse of the posterior mitral leaflet was present.  Moderate appearing, likely severe anterior directed  mitral   regurgitation.  Estimated right ventricular systolic pressure  is 35 mmHg.    Most recent labs:       Lab Results   Component Value Date/Time    HEMOGLOBIN 14.9 05/07/2020 11:55 AM    HEMATOCRIT 45.9 05/07/2020 11:55 AM    MCV 90.7 05/07/2020 11:55 AM      Lab Results   Component Value Date/Time    SODIUM 136 05/07/2020 11:55 AM    POTASSIUM 3.7 05/07/2020 11:55 AM    CHLORIDE 100 05/07/2020 11:55 AM    CO2 23 05/07/2020 11:55 AM    GLUCOSE 97 05/07/2020 11:55 AM    BUN 8 05/07/2020 11:55 AM    CREATININE 0.80 05/07/2020 11:55 AM      Lab Results   Component Value Date/Time    ASTSGOT 18 05/07/2020 11:55 AM    ALTSGPT 17 05/07/2020 11:55 AM    ALBUMIN 4.6 05/07/2020 11:55 AM      Lab Results   Component Value Date/Time    CHOLSTRLTOT 213 (H) 05/07/2020 11:55 AM     (H) 05/07/2020 11:55 AM    HDL 61 05/07/2020 11:55 AM    TRIGLYCERIDE 97 05/07/2020 11:55 AM           Past Medical History:   Diagnosis Date   • Arrhythmia     atrial fibrillation   • Heart murmur 2019    mitral valve prolapse   • Hyperlipidemia    • HEATHER (obstructive sleep apnea)      Past Surgical History:   Procedure Laterality Date   • BLADDER NECK CONTRACTURE EXICISION       Family History   Problem Relation Age of Onset   • Lung Disease Mother    • Alzheimer's  "Disease Father    • Diabetes Maternal Grandmother      Social History     Socioeconomic History   • Marital status:      Spouse name: Not on file   • Number of children: Not on file   • Years of education: Not on file   • Highest education level: Not on file   Occupational History   • Not on file   Social Needs   • Financial resource strain: Not on file   • Food insecurity     Worry: Not on file     Inability: Not on file   • Transportation needs     Medical: Not on file     Non-medical: Not on file   Tobacco Use   • Smoking status: Never Smoker   • Smokeless tobacco: Never Used   Substance and Sexual Activity   • Alcohol use: No   • Drug use: Never   • Sexual activity: Yes     Partners: Male   Lifestyle   • Physical activity     Days per week: Not on file     Minutes per session: Not on file   • Stress: Not on file   Relationships   • Social connections     Talks on phone: Not on file     Gets together: Not on file     Attends [employer] service: Not on file     Active member of club or organization: Not on file     Attends meetings of clubs or organizations: Not on file     Relationship status: Not on file   • Intimate partner violence     Fear of current or ex partner: Not on file     Emotionally abused: Not on file     Physically abused: Not on file     Forced sexual activity: Not on file   Other Topics Concern   • Not on file   Social History Narrative   • Not on file     Allergies   Allergen Reactions   • Keflex      Pt unsure of reaction       Current Outpatient Medications   Medication Sig Dispense Refill   • aspirin EC (ECOTRIN) 81 MG Tablet Delayed Response Take 81 mg by mouth every day.       No current facility-administered medications for this visit.        ROS    All others systems reviewed and negative.     Objective:     /76 (BP Location: Left arm, Patient Position: Sitting)   Pulse 87   Ht 1.702 m (5' 7\")   Wt 61.2 kg (135 lb)  Body mass index is 21.14 kg/m².    This encounter was " conducted via Zoom.  Verbal consent was obtained. Patient's identity was verified.    Vitals obtained by patient:    Physical Exam:  General: No acute distress. Well nourished.   HEENT: EOM grossly intact, no scleral icterus, no pharyngeal erythema.   Neck:  No JVD noted at 90 degrees, trachea midline  CVS: Pulse as reported by patient, no visible LE edema.  Resp: Unlabored respiratory effort, no cough or audible wheeze  MSK/Ext: No clubbing or cyanosis visible appreciated.  Skin: No rashes in visible areas.  Neurological: CN III-XII grossly intact. No focal deficits.   Psych: A&O x 3, appropriate affect, good judgement, well groomed      Assessment:     1. Paroxysmal atrial fibrillation (HCC)     2. Mitral prolapse  CL-LEFT HEART CATHETERIZATION W/ AORTIC ROOT   3. Non-rheumatic mitral regurgitation  CL-LEFT HEART CATHETERIZATION W/ AORTIC ROOT   4. Pure hypercholesterolemia     5. Other secondary pulmonary hypertension (HCC)     6. DAWKINS (dyspnea on exertion)     7. Palpitations     8. Lightheaded         Medical Decision Making:  Today's Assessment / Status / Plan:     -Symptomatic severe MR from MVP, tentative June 17 th surgery  -Having symptomatic afib, lasts a few hours. We discussed metoprolol and other options.  -Anticipate eventual need for diuretics with moderate to severe mitral regurgitation, she would rather discuss surgery first at this time, does have DAWKINS, no alarming features on her echo  -Chads 2 vascular score is 2 at this time, aspirin 162 mg at this time  -Planning surgery, needs [facility] prior  -Asked Dr. Bryan if he wants BRIGID.  -Monitor blood pressure, I do not think it is typically elevated  -Nausea could be related but not clear, see below  -Eleveted LDL, no statin at this time  -RTC 2 months, keep appt with Zahra Leon    Written instructions given today:      -Left heart angiogram to look at the heart arteries to see if you need bypass surgery of the heart arteries during open heart  lindy.    -The risks of left heart catheterization are very, very low, 0.1% chance of heart attack, emergent open heart surgery, stroke, kidney dysfunction and death on the table.  Risk and benefit will further be discussed by the interventionalist performing a left heart catheterization. There will not be intervention, no placement of stent or balloons.    -Dr. Bryan may want a BRIGID, transesophageal echocardiogram. We use deep sedation with propofol, pass a probe down the esophagus to see the mitral valve much better. Can help him prepare for the surgery.  I will to ask him if he wants this. 1 in 10,000 chance of damage to the esophagus. If he wants this, my office will order it and let you know.    Return in about 2 months (around 7/22/2020).    It is my pleasure to participate in the care of Ms. Sanchez.  Please do not hesitate to contact me with questions or concerns.    Roshni Gimenez MD, [facility]  Cardiologist [facility]    Please note that this dictation was created using voice recognition software. I have made every reasonable attempt to correct obvious errors, but it is possible there are errors of grammar and possibly content that I did not discover before finalizing the note.

## 2020-05-22 NOTE — LETTER
[facility]   [address]   [address]  [place] [address]  Phone: [phone]  Fax: [phone]              Frances Sanchez  1945    Encounter Date: 5/22/2020    Roshni Gimenez M.D.          PROGRESS NOTE:  Subjective:   Chief Complaint:   Chief Complaint   Patient presents with   • Atrial Fibrillation       This office visit is taking place via video conference in the setting of the COVID-19 pandemic.    Frances Sanchez is a 75 y.o. female who is referred by JOSE A Lara for atrial fibrillation, mitral valve prolapse with moderate to severe mitral regurgitation.    A few weeks ago, was having daily nausea, getting better.  Notes her pulse in her ear, throat and sternum is irregular at times.  Has palpitations at times, not clear if afib, feels like is it beating forcefully, fast, then slow, lasts a few hours, then stop. Has not happened for the past 2 months.  Arms get tingling, face tingles.  Now wakes up with nausea, ringing in her ears.    Has had heart murmur, was told would need surgery at some point.    Previously had paroxysmal atrial fibrillation around 2014 at [facility], last seen by them November 2019. Dr. Samayoa, wore a monitor in 2019. She recalls  Was concerned about taking recommended medications.  Chads 2 vascular score of 2, 2, previously suggested Xarelto, but then was on ASA, is not taking it until 3 days ago, takes with milk, did not tolerate  Has had tinnitus, not new, not related to ASA.    Echocardiogram 5/7/2020 with prolapse of the posterior leaflet with moderate to severe mitral regurgitation, RVSP 35.    Talked to Dr. Bryan, will try repair or replacement with tissue valve and MAZE.    Has hyperlipidemia, .  Has vitamin D deficiency, back in normal range.  Has had elevated A1c in the past but not recent.    She is not limited by chest pain, pressure or tightness with activity.   She can walk up/down  stairs and walk one block, feels mild DAWKINS.  No orthopnea or lower extremity swelling.   Some mild lightheadedness.   No presyncope/syncope.   No symptoms of leg claudication.   No stroke/TIA like symptoms.    No family history of premature coronary artery disease.  No prior smoking history.  No history of diabetes.  No history of hypertension. BP up today, she is nervious, prior BP has been ok (reviewed by me)  No history of autoimmune disease such as lupus or rheumatoid arthritis.  No chronic kidney disease.    Here with her , Aleksandar.  They are recently .    DATA REVIEWED by me:  ECG 5/7/2020  Sinus, 77, borderline anterior ST depression    Echo 5/7/2020  No prior study is available for comparison.   Left ventricular ejection fraction is visually estimated to be 65%.  Normal regional wall motion.  The left atrium is normal in size.  Prolapse of the posterior mitral leaflet was present.  Moderate appearing, likely severe anterior directed  mitral   regurgitation.  Estimated right ventricular systolic pressure  is 35 mmHg.    Most recent labs:       Lab Results   Component Value Date/Time    HEMOGLOBIN 14.9 05/07/2020 11:55 AM    HEMATOCRIT 45.9 05/07/2020 11:55 AM    MCV 90.7 05/07/2020 11:55 AM      Lab Results   Component Value Date/Time    SODIUM 136 05/07/2020 11:55 AM    POTASSIUM 3.7 05/07/2020 11:55 AM    CHLORIDE 100 05/07/2020 11:55 AM    CO2 23 05/07/2020 11:55 AM    GLUCOSE 97 05/07/2020 11:55 AM    BUN 8 05/07/2020 11:55 AM    CREATININE 0.80 05/07/2020 11:55 AM      Lab Results   Component Value Date/Time    ASTSGOT 18 05/07/2020 11:55 AM    ALTSGPT 17 05/07/2020 11:55 AM    ALBUMIN 4.6 05/07/2020 11:55 AM      Lab Results   Component Value Date/Time    CHOLSTRLTOT 213 (H) 05/07/2020 11:55 AM     (H) 05/07/2020 11:55 AM    HDL 61 05/07/2020 11:55 AM    TRIGLYCERIDE 97 05/07/2020 11:55 AM           Past Medical History:   Diagnosis Date   • Arrhythmia     atrial fibrillation   • Heart  murmur 2019    mitral valve prolapse   • Hyperlipidemia    • HEATHER (obstructive sleep apnea)      Past Surgical History:   Procedure Laterality Date   • BLADDER NECK CONTRACTURE EXICISION       Family History   Problem Relation Age of Onset   • Lung Disease Mother    • Alzheimer's Disease Father    • Diabetes Maternal Grandmother      Social History     Socioeconomic History   • Marital status:      Spouse name: Not on file   • Number of children: Not on file   • Years of education: Not on file   • Highest education level: Not on file   Occupational History   • Not on file   Social Needs   • Financial resource strain: Not on file   • Food insecurity     Worry: Not on file     Inability: Not on file   • Transportation needs     Medical: Not on file     Non-medical: Not on file   Tobacco Use   • Smoking status: Never Smoker   • Smokeless tobacco: Never Used   Substance and Sexual Activity   • Alcohol use: No   • Drug use: Never   • Sexual activity: Yes     Partners: Male   Lifestyle   • Physical activity     Days per week: Not on file     Minutes per session: Not on file   • Stress: Not on file   Relationships   • Social connections     Talks on phone: Not on file     Gets together: Not on file     Attends Mu-ism service: Not on file     Active member of club or organization: Not on file     Attends meetings of clubs or organizations: Not on file     Relationship status: Not on file   • Intimate partner violence     Fear of current or ex partner: Not on file     Emotionally abused: Not on file     Physically abused: Not on file     Forced sexual activity: Not on file   Other Topics Concern   • Not on file   Social History Narrative   • Not on file     Allergies   Allergen Reactions   • Keflex      Pt unsure of reaction       Current Outpatient Medications   Medication Sig Dispense Refill   • aspirin EC (ECOTRIN) 81 MG Tablet Delayed Response Take 81 mg by mouth every day.       No current facility-administered  "medications for this visit.        ROS    All others systems reviewed and negative.     Objective:     /76 (BP Location: Left arm, Patient Position: Sitting)   Pulse 87   Ht 1.702 m (5' 7\")   Wt 61.2 kg (135 lb)  Body mass index is 21.14 kg/m².    This encounter was conducted via Zoom.  Verbal consent was obtained. Patient's identity was verified.    Vitals obtained by patient:    Physical Exam:  General: No acute distress. Well nourished.   HEENT: EOM grossly intact, no scleral icterus, no pharyngeal erythema.   Neck:  No JVD noted at 90 degrees, trachea midline  CVS: Pulse as reported by patient, no visible LE edema.  Resp: Unlabored respiratory effort, no cough or audible wheeze  MSK/Ext: No clubbing or cyanosis visible appreciated.  Skin: No rashes in visible areas.  Neurological: CN III-XII grossly intact. No focal deficits.   Psych: A&O x 3, appropriate affect, good judgement, well groomed      Assessment:     1. Paroxysmal atrial fibrillation (HCC)     2. Mitral prolapse  CL-LEFT HEART CATHETERIZATION W/ AORTIC ROOT   3. Non-rheumatic mitral regurgitation  CL-LEFT HEART CATHETERIZATION W/ AORTIC ROOT   4. Pure hypercholesterolemia     5. Other secondary pulmonary hypertension (HCC)     6. DAWKINS (dyspnea on exertion)     7. Palpitations     8. Lightheaded         Medical Decision Making:  Today's Assessment / Status / Plan:     -Symptomatic severe MR from MVP, tentative June 17 th surgery  -Having symptomatic afib, lasts a few hours. We discussed metoprolol and other options.  -Anticipate eventual need for diuretics with moderate to severe mitral regurgitation, she would rather discuss surgery first at this time, does have DAWKINS, no alarming features on her echo  -Chads 2 vascular score is 2 at this time, aspirin 162 mg at this time  -Planning surgery, needs [facility] prior  -Asked Dr. Bryan if he wants BRIGID.  -Monitor blood pressure, I do not think it is typically elevated  -Nausea could be related but not " clear, see below  -Eleveted LDL, no statin at this time  -RTC 2 months, keep appt with Zahra Leon    Written instructions given today:      -Left heart angiogram to look at the heart arteries to see if you need bypass surgery of the heart arteries during open heart sugery.    -The risks of left heart catheterization are very, very low, 0.1% chance of heart attack, emergent open heart surgery, stroke, kidney dysfunction and death on the table.  Risk and benefit will further be discussed by the interventionalist performing a left heart catheterization. There will not be intervention, no placement of stent or balloons.    -Dr. Bryan may want a BRIGID, transesophageal echocardiogram. We use deep sedation with propofol, pass a probe down the esophagus to see the mitral valve much better. Can help him prepare for the surgery.  I will to ask him if he wants this. 1 in 10,000 chance of damage to the esophagus. If he wants this, my office will order it and let you know.    Return in about 2 months (around 7/22/2020).    It is my pleasure to participate in the care of Ms. Sanchez.  Please do not hesitate to contact me with questions or concerns.    Roshni Gimenez MD, [facility]  Cardiologist [facility]    Please note that this dictation was created using voice recognition software. I have made every reasonable attempt to correct obvious errors, but it is possible there are errors of grammar and possibly content that I did not discover before finalizing the note.      Jane Mobley A.P.R.NMonica  [address]  [place] [address]  VIA In Basket     Jeronimo Bryan M.D.  [address]  [address]  [place] [address]  VIA In Basket

## 2020-05-26 ENCOUNTER — HOSPITAL ENCOUNTER (OUTPATIENT)
Facility: MEDICAL CENTER | Age: 75
End: 2020-05-26
Attending: INTERNAL MEDICINE | Admitting: INTERNAL MEDICINE
Payer: MEDICARE

## 2020-05-26 ENCOUNTER — APPOINTMENT (OUTPATIENT)
Dept: CARDIOLOGY | Facility: MEDICAL CENTER | Age: 75
End: 2020-05-26
Attending: NURSE PRACTITIONER
Payer: MEDICARE

## 2020-05-26 VITALS
RESPIRATION RATE: 19 BRPM | HEIGHT: 67 IN | BODY MASS INDEX: 20.35 KG/M2 | TEMPERATURE: 97.5 F | SYSTOLIC BLOOD PRESSURE: 109 MMHG | WEIGHT: 129.63 LBS | HEART RATE: 61 BPM | DIASTOLIC BLOOD PRESSURE: 47 MMHG | OXYGEN SATURATION: 98 %

## 2020-05-26 DIAGNOSIS — I34.0 SEVERE MITRAL REGURGITATION: ICD-10-CM

## 2020-05-26 PROBLEM — R60.0 BILATERAL LEG EDEMA: Status: ACTIVE | Noted: 2020-05-26

## 2020-05-26 PROCEDURE — 700105 HCHG RX REV CODE 258: Performed by: INTERNAL MEDICINE

## 2020-05-26 PROCEDURE — 700101 HCHG RX REV CODE 250

## 2020-05-26 PROCEDURE — 93567 NJX CAR CTH SPRVLV AORTGRPHY: CPT | Performed by: INTERNAL MEDICINE

## 2020-05-26 PROCEDURE — 93458 L HRT ARTERY/VENTRICLE ANGIO: CPT | Mod: 26 | Performed by: INTERNAL MEDICINE

## 2020-05-26 PROCEDURE — 700117 HCHG RX CONTRAST REV CODE 255: Performed by: INTERNAL MEDICINE

## 2020-05-26 PROCEDURE — 160002 HCHG RECOVERY MINUTES (STAT)

## 2020-05-26 PROCEDURE — 700111 HCHG RX REV CODE 636 W/ 250 OVERRIDE (IP)

## 2020-05-26 PROCEDURE — 99152 MOD SED SAME PHYS/QHP 5/>YRS: CPT | Performed by: INTERNAL MEDICINE

## 2020-05-26 PROCEDURE — 99153 MOD SED SAME PHYS/QHP EA: CPT

## 2020-05-26 RX ORDER — ONDANSETRON 4 MG/1
4 TABLET, ORALLY DISINTEGRATING ORAL EVERY 6 HOURS PRN
COMMUNITY
End: 2020-07-08 | Stop reason: SDUPTHER

## 2020-05-26 RX ORDER — LIDOCAINE HYDROCHLORIDE 20 MG/ML
INJECTION, SOLUTION INFILTRATION; PERINEURAL
Status: COMPLETED
Start: 2020-05-26 | End: 2020-05-26

## 2020-05-26 RX ORDER — SODIUM CHLORIDE 9 MG/ML
INJECTION, SOLUTION INTRAVENOUS CONTINUOUS
Status: DISCONTINUED | OUTPATIENT
Start: 2020-05-26 | End: 2020-05-26 | Stop reason: HOSPADM

## 2020-05-26 RX ORDER — HEPARIN SODIUM,PORCINE 1000/ML
VIAL (ML) INJECTION
Status: COMPLETED
Start: 2020-05-26 | End: 2020-05-26

## 2020-05-26 RX ORDER — HEPARIN SODIUM 200 [USP'U]/100ML
INJECTION, SOLUTION INTRAVENOUS
Status: COMPLETED
Start: 2020-05-26 | End: 2020-05-26

## 2020-05-26 RX ORDER — MIDAZOLAM HYDROCHLORIDE 1 MG/ML
INJECTION INTRAMUSCULAR; INTRAVENOUS
Status: COMPLETED
Start: 2020-05-26 | End: 2020-05-26

## 2020-05-26 RX ORDER — VERAPAMIL HYDROCHLORIDE 2.5 MG/ML
INJECTION, SOLUTION INTRAVENOUS
Status: COMPLETED
Start: 2020-05-26 | End: 2020-05-26

## 2020-05-26 RX ADMIN — MIDAZOLAM HYDROCHLORIDE 2 MG: 1 INJECTION, SOLUTION INTRAMUSCULAR; INTRAVENOUS at 12:29

## 2020-05-26 RX ADMIN — VERAPAMIL HYDROCHLORIDE 2.5 MG: 2.5 INJECTION, SOLUTION INTRAVENOUS at 12:04

## 2020-05-26 RX ADMIN — MIDAZOLAM HYDROCHLORIDE 0.5 MG: 1 INJECTION, SOLUTION INTRAMUSCULAR; INTRAVENOUS at 12:47

## 2020-05-26 RX ADMIN — IOHEXOL 60 ML: 350 INJECTION, SOLUTION INTRAVENOUS at 12:48

## 2020-05-26 RX ADMIN — HEPARIN SODIUM 2000 UNITS: 200 INJECTION, SOLUTION INTRAVENOUS at 12:30

## 2020-05-26 RX ADMIN — LIDOCAINE HYDROCHLORIDE: 20 INJECTION, SOLUTION INFILTRATION; PERINEURAL at 12:03

## 2020-05-26 RX ADMIN — NITROGLYCERIN: 20 INJECTION INTRAVENOUS at 12:00

## 2020-05-26 RX ADMIN — FENTANYL CITRATE 75 MCG: 50 INJECTION INTRAMUSCULAR; INTRAVENOUS at 12:48

## 2020-05-26 RX ADMIN — HEPARIN SODIUM: 1000 INJECTION, SOLUTION INTRAVENOUS; SUBCUTANEOUS at 12:04

## 2020-05-26 RX ADMIN — SODIUM CHLORIDE: 9 INJECTION, SOLUTION INTRAVENOUS at 09:14

## 2020-05-26 ASSESSMENT — FIBROSIS 4 INDEX: FIB4 SCORE: 1.97

## 2020-05-26 NOTE — DISCHARGE INSTRUCTIONS
ACTIVITY: Rest and take it easy for the first 24 hours.  A responsible adult is recommended to remain with you during that time.  It is normal to feel sleepy.  We encourage you to not do anything that requires balance, judgment or coordination.    MILD FLU-LIKE SYMPTOMS ARE NORMAL. YOU MAY EXPERIENCE GENERALIZED MUSCLE ACHES, THROAT IRRITATION, HEADACHE AND/OR SOME NAUSEA.    FOR 24 HOURS DO NOT:  Drive, operate machinery or run household appliances.  Drink beer or alcoholic beverages.   Make important decisions or sign legal documents.    SPECIAL INSTRUCTIONS:     POST ANGIOGRAM  General Care Instructions  • Maintain a bandage over the incision site for 24 hours.  It's normal to find a small bruise or dime-sized lump at the insertion site. This should disappear within a few weeks.  • Do not apply lotions or powders to the site.  Do not immerse the catheter insertion site in water (bathtub/swimming) for five days. It is ok to shower 24 hours after the procedure.  • You may resume your normal diet immediately; on the day of your procedure, drink 6-10 glasses of water to help flush the contrast liquid out of your system.  • If the doctor inserted the catheter in your arm:  o For 5 days, DO NOT lift anything heavier than 10 pounds (approximately a gallon of milk). DO NOT do any heavy pushing, pulling, or twisting.    Medications  • If your current medications need to be changed, you will be provided with an updated list of your medications prior to discharge.  • If you take warfarin (Coumadin), resume taking your usual dose the evening after the procedure.  • DO NOT STOP taking prescribed blood thinning (anti-platelet) medications unless instructed by your cardiologist.  These medications include:  o Aspirin, Clopidogrel (Plavix), Ticagrelor (Brilinta), or Prasugrel (Effient)   • If you take one of the following anticoagulants, RESUME 24 HOURS after your procedure:  o Apixiban (Eliquis), Rivaroxaban (Xarelto),  Dabigatran (Pradaxa), Edoxaban (Savaysa)  • If you take metformin (Glucophage), RESUME 48 HOURS after your procedure.    When to call your healthcare provider  Call your cardiologist right away at [phone] if you have any of the following:  ?  Problems/Concerns taking any of your prescribed heart medicines.  ?  The insertion site has increasing pain, swelling, redness, bleeding, or drainage.  ?  Your arm or leg below where the insertion site changes color, is cool, or is numb.  ?  You have chest pain or shortness of breath that does not go away with rest.  ?  Your pulse feels irregular -- very slow (less than 60 beats/minute) or very fast (over 100 beats/minute).  ?  You have dizziness, fainting, or you are very tired.  ?  You are coughing up blood or yellow or green mucus.  ?  You have chills or a fever over 101°F (38.3°C).   ?  If there is bleeding at the catheter insertion site, apply pressure for 10 minutes.  If bleeding persists, call 911, and continue to hold pressure until advanced medical support arrives.      DIET: To avoid nausea, slowly advance diet as tolerated, avoiding spicy or greasy foods for the first day.  Add more substantial food to your diet according to your physician's instructions.  INCREASE FLUIDS AND FIBER TO AVOID CONSTIPATION.    SURGICAL DRESSING/BATHING: Keep dressing and incision dry and intact for 24 hours, may remove dressing and shower after 3 PM on 5/27, do not need to replace.  Do not submerge site in water for 7 days.    FOLLOW-UP APPOINTMENT:  A follow-up appointment should be arranged with your Dr. Ta [phone]; call to schedule.    You should CALL YOUR PHYSICIAN if you develop:  Fever greater than 101 degrees F.  Pain not relieved by medication, or persistent nausea or vomiting.  Excessive bleeding (blood soaking through dressing) or unexpected drainage from the wound.  Extreme redness or swelling around the incision site, drainage of pus or foul smelling  drainage.  Inability to urinate or empty your bladder within 8 hours.  Problems with breathing or chest pain.    You should call 911 if you develop problems with breathing or chest pain.  If you are unable to contact your doctor or surgical center, you should go to the nearest emergency room or urgent care center.  Physician's telephone #: [phone]    If any questions arise, call your doctor.  If your doctor is not available, please feel free to call the Surgical Center at [phone].  The Center is open Monday through Friday from 7AM to 7PM.  You can also call the HEALTH HOTLINE open 24 hours/day, 7 days/week and speak to a nurse at [phone], or toll free at [phone].    A registered nurse may call you a few days after your surgery to see how you are doing after your procedure.    MEDICATIONS: Resume taking daily medication.  Take prescribed pain medication with food.  If no medication is prescribed, you may take non-aspirin pain medication if needed.  PAIN MEDICATION CAN BE VERY CONSTIPATING.  Take a stool softener or laxative such as senokot, pericolace, or milk of magnesia if needed.    If your physician has prescribed pain medication that includes Acetaminophen (Tylenol), do not take additional Acetaminophen (Tylenol) while taking the prescribed medication.    Depression / Suicide Risk    As you are discharged from this [facility] facility, it is important to learn how to keep safe from harming yourself.    Recognize the warning signs:  · Abrupt changes in personality, positive or negative- including increase in energy   · Giving away possessions  · Change in eating patterns- significant weight changes-  positive or negative  · Change in sleeping patterns- unable to sleep or sleeping all the time   · Unwillingness or inability to communicate  · Depression  · Unusual sadness, discouragement and loneliness  · Talk of wanting to die  · Neglect of personal appearance   · Rebelliousness- reckless  behavior  · Withdrawal from people/activities they love  · Confusion- inability to concentrate     If you or a loved one observes any of these behaviors or has concerns about self-harm, here's what you can do:  · Talk about it- your feelings and reasons for harming yourself  · Remove any means that you might use to hurt yourself (examples: pills, rope, extension cords, firearm)  · Get professional help from the community (Mental Health, Substance Abuse, psychological counseling)  · Do not be alone:Call your Safe Contact- someone whom you trust who will be there for you.  · Call your local CRISIS HOTLINE 441-8938 or 052-668-3719  · Call your local Children's Mobile Crisis Response Team [place] (012) 660-0037 or www.[place]  · Call the toll free National Suicide Prevention Hotlines   · National Suicide Prevention Lifeline 563-321-TMND (9669)  · National Hope Line Network 800-SUICIDE (031-8384)

## 2020-05-26 NOTE — OP REPORT
Cardiac catheterization report    Procedure date: 5/26/2020    Referring physician: Dr. Jeronimo Bryan    Pre-operative Diagnosis:  Symptomatic severe mitral regurgitation    Post-operative Diagnosis:   1. No evidence of coronary artery disease  2. 4+ mitral regurgitation  3. Normal left ventricular systolic function. EF 70%  4. Normal sized aortic root without significant aortic regurgitation    Procedure:  1. Left heart catheterization with left ventriculography  2. Selective coronary angiography  3. Aortic root injection  4. Supervision of moderate conscious sedation    Complications: None    Description of Procedure:  After informed consent was obtained, the patient was brought to cardiac catheterization laboratory in fasting state.   Artie test was carried out on the right hand and was found to be negative.  Right wrist and right groin were then prepped and drapped in sterile fashion.  Versed and fentanyl were used for conscious sedation.  Lidocaine 2% was used to anesthetize the area.  A 6 [place] Terumo sheath was then placed in the right radial artery using Seldinger technique.  A 2.5 mg of verapamil, 100 microgram of nitroglycerine and 3000 units of heparin were administered into the radial sheath.  A 5 [place] TIG catheter was used to enter the left ventricle.    Left ventricular pressure was recorded.   Left ventriculography was performed using 16 cc of contrast injected over 2 seconds.  Selective angiography of the right and left coronary artery were performed in multiple views using 5 [place] TIG catheter.   Aortic root injection was then performed using 16 cc injected over 2 seconds.  At the end of the case, the catheter and the radial sheath were removed.   Hemostasis was obtained using Terumo TR wrist band.  The patient tolerated procedure well and left cardiac catheterization laboratory in stable condition.    I supervised monitoring the patient under moderate conscious sedation beginning at 12:18 PM  until the end of the case at 12:45 PM.    Findings:  There is no gradient across aortic valve.  Left ventricular end-diastolic pressure was 11 mmHg.    Left ventriculography showed normal wall motion.  Overall LV systolic function is normal .  Ejection fraction is calculated to be 70 %.  There is 4+ mitral regurgitation.  The left atrium is severely enlarged.    NO evidence of coronary artery disease    This is right dominant system.    Left main is large and without flow limiting disease.  It trifurcated into left anterior descending artery, ramus intermediate artery and left circumflex artery.     Left anterior descending artery is large caliber vessel and extends to the apex.  It gives rises to one large and a couple small diagonal branches.  There is no signficant disease in the left anterior descending artery or its major branches.  The antegrade flow is normal.    Ramus intermediate artery is large and long. It is without significant disease.    Left circumflex artery is medium in caliber.   It gives rise to several obtuse marginal branches.  There is no significant disease in the LCX system.  The antegrade flow is normal.    Right coronary artery (RCA) is large caliber. It has anterior take off.  It gives rise to several small acute marginal branches, posterior descending artery and posterolateral branch.  There is no significant disease in the right coronary artery or its major branches.  The antegrade flow is normal.      Plan:   Limit right wrist movement for 24 hours.      Harini Paul M.D.

## 2020-05-26 NOTE — OR NURSING
COVID-19 Pre-surgery screenin. Do you have an undiagnosed respiratory illness or symptoms such as coughing or sneezing? No  a. Onset of Sx   b. Acute vs. chronic respiratory illness         2. Do you have an unexplained fever greater than 100.4 degrees Fahrenheit or 38 degrees Celsius? No                        3. Have you had direct exposure to a patient who tested positive for Covid-19? No                                4. Have you traveled outside of [place] within the last 14 days? NO                          Informed of no visitor policy: YES

## 2020-05-27 ENCOUNTER — HOSPITAL ENCOUNTER (OUTPATIENT)
Dept: RADIOLOGY | Facility: MEDICAL CENTER | Age: 75
End: 2020-05-27
Attending: NURSE PRACTITIONER
Payer: MEDICARE

## 2020-05-27 DIAGNOSIS — I34.0 SEVERE MITRAL REGURGITATION: ICD-10-CM

## 2020-05-27 PROCEDURE — 93880 EXTRACRANIAL BILAT STUDY: CPT

## 2020-05-27 NOTE — OR NURSING
1301 Pt over from cath lab post cardiac angiogram. Right wrist site with TR band in place, site CDI and no signs of bleeding, pt awake and alert, denies pain and nausea, VSS  1330 Pt resting comfortably no needs at this time  1345 2mls of air removed from TR band, site CDI and no signs of bleeding  1355 Tolerating orals  1400 2mls of air removed from TR band, site CDI and no signs of bleeding  1415 2mls of air removed from TR band, site CDI and no signs of bleeding  1430 2mls of air removed from TR band, site CDI and no signs of bleeding  1445 2mls of air removed from TR band, site CDI and no signs of bleeding  1500 Final 2mls of air removed from TR band, site CDI and no signs of bleeding. Gauze and tegaderm placed.  1510 Pt ambulated to restroom, tolerated well  1520 Pt wheeled off of unit with all belongings by RN without incident down to Aleksandar, pt's spouse.  Discharge instructions provided to pt and spouse. Discussed diet, activity, follow up, prescriptions and symptom management. Pt and  spouse state understanding. Pt and spouse state all questions have been answered. Copy of discharge provided to pt.

## 2020-05-28 ENCOUNTER — TELEPHONE (OUTPATIENT)
Dept: CARDIOTHORACIC SURGERY | Facility: MEDICAL CENTER | Age: 75
End: 2020-05-28

## 2020-05-28 NOTE — TELEPHONE ENCOUNTER
Patient and her  had questions on the following topics:  Anxiety control from now till surgery-she was referred to her primary to discuss medication options    She wanted to review her lab work, carotid US , and angiogram that was recently done which I went over with her    She asked about donating blood for herself if transfusion was needed she was told this is not something that we offer    She had indepth questions on MAZE and valve procedure, necessity and indications. I reviewed physiology and pathophysiology of a fib-she and her  verbalized understanding    Aleksandar asked about visiting hours-they are very close and his is uncomfortable with being  from her for the duration of her stay.  I let him know if things changed I would let him know    She asked about recovery time line around that 1 month pop which was reviewed with her-fatigue, insomnia, but generally should be feeling better in the 4-6 week range.    No additional questions.  I also reminded them that their pre op education on the 11th would cover much of this content.    Call time 26 minutes

## 2020-05-29 ENCOUNTER — OFFICE VISIT (OUTPATIENT)
Dept: MEDICAL GROUP | Facility: PHYSICIAN GROUP | Age: 75
End: 2020-05-29
Payer: MEDICARE

## 2020-05-29 VITALS
HEART RATE: 74 BPM | OXYGEN SATURATION: 98 % | RESPIRATION RATE: 20 BRPM | HEIGHT: 67 IN | DIASTOLIC BLOOD PRESSURE: 56 MMHG | TEMPERATURE: 100.5 F | SYSTOLIC BLOOD PRESSURE: 110 MMHG | WEIGHT: 129.3 LBS | BODY MASS INDEX: 20.29 KG/M2

## 2020-05-29 DIAGNOSIS — I34.0 SEVERE MITRAL REGURGITATION: ICD-10-CM

## 2020-05-29 DIAGNOSIS — R11.0 NAUSEA: ICD-10-CM

## 2020-05-29 DIAGNOSIS — F41.9 ANXIETY: ICD-10-CM

## 2020-05-29 PROCEDURE — 99213 OFFICE O/P EST LOW 20 MIN: CPT | Performed by: NURSE PRACTITIONER

## 2020-05-29 RX ORDER — CITALOPRAM HYDROBROMIDE 10 MG/1
10 TABLET ORAL DAILY
Qty: 60 TAB | Refills: 0 | Status: ON HOLD | OUTPATIENT
Start: 2020-05-29 | End: 2020-06-17

## 2020-05-29 ASSESSMENT — FIBROSIS 4 INDEX: FIB4 SCORE: 1.97

## 2020-05-29 NOTE — ASSESSMENT & PLAN NOTE
Acute on chronic issue.  Recurring mild nausea without vomiting in the last 3-4 days.  Patient was recently diagnosed with severe mitral regurgitation, associated anxiety.  She does have ondansetron at home from previous recent prescription which he picked up and never used. She does not like taking any supplements or medications.

## 2020-05-29 NOTE — PROGRESS NOTES
Chief Complaint   Patient presents with   • Anxiety     wants medication       HISTORY OF PRESENT ILLNESS: Patient is a 75 y.o. female, established patient who presents today to discuss medical problems as listed below:    Severe mitral regurgitation  Chronic issue.  Patient scheduled for cardiac surgery on 6/10/2020.    Anxiety  New problem today.  Patient reports anxiety, situational, due to upcoming cardiac surgery scheduled in 2 weeks.  Associated symptoms are recurrent nausea.  She does not take any OTC supplements or medications for symptoms.  She never tried antidepressants in the past.  Patient does have an underlying intermittent anxiety.  She sleeps well, maintains healthy nutrition, adequate hydration.  Denies CP, palpitations, dizziness, headaches.  She does have good support system and family members and significant other.  Denies SI/HI.  Patient is interested in Rx on anxiety medication today.    Nausea  Acute on chronic issue.  Recurring mild nausea without vomiting in the last 3-4 days.  Patient was recently diagnosed with severe mitral regurgitation, associated anxiety.  She does have ondansetron at home from previous recent prescription which he picked up and never used. She does not like taking any supplements or medications.      Patient Active Problem List    Diagnosis Date Noted   • Severe mitral regurgitation 05/07/2020     Priority: High   • Anxiety 05/29/2020   • Mixed hyperlipidemia 05/15/2020   • Atrial fibrillation (HCC) 05/07/2020   • Nausea 05/07/2020   • Other hemorrhoids 04/21/2020        Allergies: Keflex    Current Outpatient Medications   Medication Sig Dispense Refill   • citalopram (CELEXA) 10 MG tablet Take 1 Tab by mouth every day. 60 Tab 0   • ondansetron (ZOFRAN ODT) 4 MG TABLET DISPERSIBLE Take 4 mg by mouth every 6 hours as needed for Nausea.     • aspirin EC (ECOTRIN) 81 MG Tablet Delayed Response Take 81 mg by mouth 2 Times a Day.       No current facility-administered  "medications for this visit.        Social History     Tobacco Use   • Smoking status: Never Smoker   • Smokeless tobacco: Never Used   Substance Use Topics   • Alcohol use: No   • Drug use: Never     Social History     Social History Narrative   • Not on file       Family History   Problem Relation Age of Onset   • Lung Disease Mother    • Alzheimer's Disease Father    • Diabetes Maternal Grandmother        Allergies, past medical history, past surgical history, family history, social history reviewed and updated.    Review of Systems:     - Constitutional: Negative for fever, chills, unexpected weight change, and fatigue/generalized weakness.     - Respiratory: Negative for cough, sputum production, chest congestion, dyspnea, wheezing, and crackles.      - Cardiovascular: Negative for chest pain, palpitations, orthopnea, and bilateral lower extremity edema.     - Psychiatric/Behavioral: anxiety. Negative for depression, suicidal/homicidal ideation and memory loss.      All other systems reviewed and are negative    Exam:    /56 (BP Location: Left arm, Patient Position: Sitting, BP Cuff Size: Adult)   Pulse 74   Temp (!) 38.1 °C (100.5 °F) (Temporal)   Resp 20   Ht 1.702 m (5' 7\")   Wt 58.7 kg (129 lb 4.8 oz)   SpO2 98%   BMI 20.25 kg/m²  Body mass index is 20.25 kg/m².    Physical Exam:  Constitutional: Well-developed and well-nourished. Not diaphoretic. No distress.   Cardiovascular: Regular rate and rhythm, S1 and S2 with loud murmur, no rubs, or gallops.    Chest: Effort normal. Clear to auscultation throughout. No adventitious sounds.   Neurological: Alert and oriented x 3.   Psychiatric:  Anxious. Otherwise Behavior, mood, and affect are appropriate.  MA/nursing note and vitals reviewed.    Patient was seen for 15 minutes face to face of which > 50% of appointment time was spent on counseling and coordination of care regarding the above.     Assessment/Plan:  1. Anxiety  Control, stable.  Trial of " citalopram 10 mg for 1 week, okay to increase to 20 mg daily in 1 week if the initial dose is inadequate.  If experiencing any side effects such as exacerbation of anxiety, report to the office and come back for reevaluation.  - citalopram (CELEXA) 10 MG tablet; Take 1 Tab by mouth every day.  Dispense: 60 Tab; Refill: 0    2. Severe mitral regurgitation  Surgery scheduled in 2 weeks, managed by cardiac surgery and follow-up with cardiology.    3. Nausea  Uncontrolled, stable.  Possibly due to exacerbation of anxiety and cardiac issue.  Recommend start taking ondansetron as needed, practice relaxation techniques.  Variety of techniques discussed such as slow deep breathing, positive thinking, positive distractions.       Discussed with patient possible alternative diagnoses, pt is to take all medications as prescribed. If symptoms persist FU w/PCP, if symptoms worsen go to emergency room. If experiencing any side effects from prescribed medications reports to the office immediately or go to emergency room.  Reviewed indication, dosage, usage and potential adverse effects of prescribed medications. Reviewed risks and benefits of treatment plan. Patient verbalizes understanding of all instruction and verbally agrees to plan.    Return if symptoms worsen or fail to improve.

## 2020-05-29 NOTE — ASSESSMENT & PLAN NOTE
New problem today.  Patient reports anxiety, situational, due to upcoming cardiac surgery scheduled in 2 weeks.  Associated symptoms are recurrent nausea.  She does not take any OTC supplements or medications for symptoms.  She never tried antidepressants in the past.  Patient does have an underlying intermittent anxiety.  She sleeps well, maintains healthy nutrition, adequate hydration.  Denies CP, palpitations, dizziness, headaches.  She does have good support system and family members and significant other.  Denies SI/HI.  Patient is interested in Rx on anxiety medication today.

## 2020-06-01 ENCOUNTER — PATIENT MESSAGE (OUTPATIENT)
Dept: CARDIOLOGY | Facility: MEDICAL CENTER | Age: 75
End: 2020-06-01

## 2020-06-04 ENCOUNTER — TELEPHONE (OUTPATIENT)
Dept: CARDIOTHORACIC SURGERY | Facility: MEDICAL CENTER | Age: 75
End: 2020-06-04

## 2020-06-04 NOTE — TELEPHONE ENCOUNTER
Called patient to update on the visitor policy; that being one designated support person you can come to pre-op and in the recovery phase daily from 3-8 pm. They were very relieved with this information.  No further questions at this time.    Call time 3 1/2 minutes.

## 2020-06-05 ENCOUNTER — PATIENT MESSAGE (OUTPATIENT)
Dept: CARDIOLOGY | Facility: MEDICAL CENTER | Age: 75
End: 2020-06-05

## 2020-06-10 ENCOUNTER — OFFICE VISIT (OUTPATIENT)
Dept: CARDIOLOGY | Facility: MEDICAL CENTER | Age: 75
End: 2020-06-10
Payer: MEDICARE

## 2020-06-10 VITALS
BODY MASS INDEX: 20.76 KG/M2 | HEIGHT: 67 IN | OXYGEN SATURATION: 98 % | DIASTOLIC BLOOD PRESSURE: 70 MMHG | HEART RATE: 88 BPM | WEIGHT: 132.28 LBS | RESPIRATION RATE: 16 BRPM | SYSTOLIC BLOOD PRESSURE: 122 MMHG

## 2020-06-10 DIAGNOSIS — I48.0 PAROXYSMAL ATRIAL FIBRILLATION (HCC): ICD-10-CM

## 2020-06-10 DIAGNOSIS — E78.2 MIXED HYPERLIPIDEMIA: ICD-10-CM

## 2020-06-10 DIAGNOSIS — I34.0 SEVERE MITRAL REGURGITATION: ICD-10-CM

## 2020-06-10 PROCEDURE — 99213 OFFICE O/P EST LOW 20 MIN: CPT | Performed by: NURSE PRACTITIONER

## 2020-06-10 ASSESSMENT — ENCOUNTER SYMPTOMS
VOMITING: 0
DIZZINESS: 0
NAUSEA: 0
HEADACHES: 0
PND: 0
PALPITATIONS: 0
FEVER: 0
CLAUDICATION: 0
HEMOPTYSIS: 0
WHEEZING: 0
CHILLS: 0
ORTHOPNEA: 0
COUGH: 0
SHORTNESS OF BREATH: 0
SPUTUM PRODUCTION: 0

## 2020-06-10 ASSESSMENT — FIBROSIS 4 INDEX: FIB4 SCORE: 1.97

## 2020-06-10 NOTE — PROGRESS NOTES
Chief Complaint   Patient presents with   • Atrial Fibrillation       Subjective:   Frances Sanchez is a 75 y.o. female who presents today for atrial fibrillation, mitral valve prolapse, and moderate to severe mitral regurgitation.    Patient comes in today as she is going to be having mitral valve surgery on 6/17/2020.  She was concerned about her wrist after her cardiac catheterization.  There is no sign of infection, no hematoma, improving ecchymosis.  She is concerned that her arm a few days ago was feeling cold.  Arm is warm today with good pulses.    Patient has occasional palpitations feels like her heart is beating more forcefully, fast and slow lasts for a few hours then stops has not happened in the last 2 months.  She has had a murmur and was told would need surgery at some point.    Previously had paroxysmal atrial fibrillation 2014 [place] [facility]je and then November 2019.  Patient has [id number] score 2, previously suggested Xarelto but then was on aspirin.  Dr. Gimenez recommended  mg.    Echocardiogram 5/7/2020 with prolapse of the posterior leaflet with moderate to severe mitral regurgitation, RVSP 35.     Has hyperlipidemia, .  Has vitamin D deficiency, back in normal range.  Has had elevated A1c in the past but not recent.     She is not limited by chest pain, pressure or tightness with activity.   She can walk up/down stairs and walk one block, feels mild DAWKINS.  No orthopnea or lower extremity swelling.   Some mild lightheadedness.   No presyncope/syncope.   No symptoms of leg claudication.   No stroke/TIA like symptoms.     No family history of premature coronary artery disease.  No prior smoking history.  No history of diabetes.  No history of hypertension. BP up today, she is nervious, prior BP has been ok (reviewed by me)  No history of autoimmune disease such as lupus or rheumatoid arthritis.  No chronic kidney disease.     Here with her , Aleksandar.    Past Medical  History:   Diagnosis Date   • Arrhythmia     hx atrial fibrillation   • Heart murmur 2019    mitral valve prolapse   • Heart valve disease    • Hyperlipidemia    • Kidney stone     hx   • HEATHER (obstructive sleep apnea)     pt denies    • Pneumonia 2015     Past Surgical History:   Procedure Laterality Date   • LITHOTRIPSY  2018   • BLADDER NECK CONTRACTURE EXICISION       Family History   Problem Relation Age of Onset   • Lung Disease Mother    • Alzheimer's Disease Father    • Diabetes Maternal Grandmother      Social History     Socioeconomic History   • Marital status:      Spouse name: Not on file   • Number of children: Not on file   • Years of education: Not on file   • Highest education level: Not on file   Occupational History   • Not on file   Social Needs   • Financial resource strain: Not on file   • Food insecurity     Worry: Not on file     Inability: Not on file   • Transportation needs     Medical: Not on file     Non-medical: Not on file   Tobacco Use   • Smoking status: Never Smoker   • Smokeless tobacco: Never Used   Substance and Sexual Activity   • Alcohol use: No   • Drug use: Never   • Sexual activity: Yes     Partners: Male   Lifestyle   • Physical activity     Days per week: Not on file     Minutes per session: Not on file   • Stress: Not on file   Relationships   • Social connections     Talks on phone: Not on file     Gets together: Not on file     Attends [employer] service: Not on file     Active member of club or organization: Not on file     Attends meetings of clubs or organizations: Not on file     Relationship status: Not on file   • Intimate partner violence     Fear of current or ex partner: Not on file     Emotionally abused: Not on file     Physically abused: Not on file     Forced sexual activity: Not on file   Other Topics Concern   • Not on file   Social History Narrative   • Not on file     Allergies   Allergen Reactions   • Keflex      Pt unsure of reaction  "    Outpatient Encounter Medications as of 6/10/2020   Medication Sig Dispense Refill   • aspirin EC (ECOTRIN) 81 MG Tablet Delayed Response Take 81 mg by mouth 2 Times a Day.     • citalopram (CELEXA) 10 MG tablet Take 1 Tab by mouth every day. (Patient not taking: Reported on 6/10/2020) 60 Tab 0   • ondansetron (ZOFRAN ODT) 4 MG TABLET DISPERSIBLE Take 4 mg by mouth every 6 hours as needed for Nausea.       No facility-administered encounter medications on file as of 6/10/2020.      Review of Systems   Constitutional: Negative for chills and fever.   Respiratory: Negative for cough, hemoptysis, sputum production, shortness of breath and wheezing.    Cardiovascular: Negative for chest pain, palpitations, orthopnea, claudication, leg swelling and PND.   Gastrointestinal: Negative for nausea and vomiting.   Neurological: Negative for dizziness and headaches.   All other systems reviewed and are negative.       Objective:   /70 (BP Location: Left arm, Patient Position: Sitting, BP Cuff Size: Adult)   Pulse 88   Resp 16   Ht 1.702 m (5' 7\")   Wt 60 kg (132 lb 4.4 oz)   SpO2 98%   BMI 20.72 kg/m²     Physical Exam   Constitutional: She appears well-developed and well-nourished.   Eyes: EOM are normal.   Neck: Neck supple. No JVD present.   Cardiovascular: Normal rate and regular rhythm.   Murmur heard.   Systolic murmur is present with a grade of 2/6.  Pulses:       Carotid pulses are 2+ on the right side and 2+ on the left side.       Radial pulses are 2+ on the right side and 2+ on the left side.   Pulmonary/Chest: Effort normal and breath sounds normal.   Abdominal: Soft.   Neurological:   Cranial nerves II-XII WNL   Skin: Skin is warm and dry.   Psychiatric: She has a normal mood and affect. Her behavior is normal. Judgment and thought content normal.   Nursing note and vitals reviewed.    Echocardiogram 5/7/2020  No prior study is available for comparison.   Left ventricular ejection fraction is " visually estimated to be 65%.  Normal regional wall motion.  Prolapse of the posterior mitral leaflet was present.  Moderate appearing, likely severe anterior directed  mitral   regurgitation.  Estimated right ventricular systolic pressure  is 35 mmHg.     ECG 5/7/2020, SR, 77, borderline anterior ST depression    Assessment:     1. Paroxysmal atrial fibrillation (HCC)     2. Mixed hyperlipidemia     3. Severe mitral regurgitation         Medical Decision Making:  Today's Assessment / Status / Plan:   When discharged from hospital, recommend  mg.  Further recommendations depending on hospital course for mitral valve surgery.    Collaborating MD is Dr. Belcher.  Follow-up visit in 3 weeks with DB.    Please note that this dictation was created using voice recognition software.  I have made every reasonable attempt to correct obvious errors, but it is possible there are errors of grammar or possibly content that I did not discover before finalizing the note.

## 2020-06-11 ENCOUNTER — APPOINTMENT (OUTPATIENT)
Dept: ADMISSIONS | Facility: MEDICAL CENTER | Age: 75
DRG: 219 | End: 2020-06-11
Payer: MEDICARE

## 2020-06-11 ENCOUNTER — NON-PROVIDER VISIT (OUTPATIENT)
Dept: CARDIOTHORACIC SURGERY | Facility: MEDICAL CENTER | Age: 75
End: 2020-06-11
Payer: MEDICARE

## 2020-06-11 NOTE — PROGRESS NOTES
Problem: Pre Op  Goal: Optimal preparation for CABG/Heart Valve surgery    Intervention: Pre Op education to patient &  Aleksandar. Provide patient with Patient Guideline for Cardiac Surgery (See Pt. Ed.)  Discussed anatomy and physiology of cardiac surgery with patient and family. Reviewed post-op expectations to include ventilator management, cardiac monitoring, tubes and drains, the use of incentive spirometry with return demonstration, early ambulation,including walking 4 times a day, up in the recliner 3 times a day for meals, cough and deep breathing with heart pillow, chest splinting with pillow, pain control, and expected length of stay. Also provided information on Cardiac Rehab and how to schedule an appointment. Patient and family state full understanding of all information given.    Intervention: Baseline assessment to include IS volume and lung sounds.  Pre-op IS: 1000  Prehabilitation IS: 1250    Intervention: Patient instructed to be NPO at midnight except cardiac medications and the Powerade drink upon waking the morning of surgery.  Drink to be provided in pre op appointment the day before surgery. (No ASA, coumadin or Plavix)    Intervention: Shower with chlorhexidine x 2  Instructed patient to wash entire body with chlorhexidine wipes prior to bedtime the night before surgery.  Wipes to be provided in pre op appointment the day before surgery.  Patient instructed to NOT shave prior to surgery.    Intervention:  5 Meter walk test times were done previously 4 5 5.

## 2020-06-12 ENCOUNTER — APPOINTMENT (OUTPATIENT)
Dept: ADMISSIONS | Facility: MEDICAL CENTER | Age: 75
DRG: 219 | End: 2020-06-12
Attending: THORACIC SURGERY (CARDIOTHORACIC VASCULAR SURGERY)
Payer: MEDICARE

## 2020-06-15 ENCOUNTER — TELEPHONE (OUTPATIENT)
Dept: CARDIOTHORACIC SURGERY | Facility: MEDICAL CENTER | Age: 75
End: 2020-06-15

## 2020-06-15 NOTE — TELEPHONE ENCOUNTER
Patient called with questions.  Personal belonging to bring to hospital reviewed, CHG wipes were reviewed on use again, reviewed nothing to eat or drink after surgery, she drinks ensure at home, stated we could restart this in hospital.    She states being very nervous before surgery, reassurance provided.    Call time 6 minutes

## 2020-06-16 ENCOUNTER — HOSPITAL ENCOUNTER (OUTPATIENT)
Dept: RADIOLOGY | Facility: MEDICAL CENTER | Age: 75
DRG: 219 | End: 2020-06-16
Attending: THORACIC SURGERY (CARDIOTHORACIC VASCULAR SURGERY) | Admitting: THORACIC SURGERY (CARDIOTHORACIC VASCULAR SURGERY)
Payer: MEDICARE

## 2020-06-16 DIAGNOSIS — I34.0 SEVERE MITRAL REGURGITATION: ICD-10-CM

## 2020-06-16 DIAGNOSIS — Z01.810 PREOPERATIVE CARDIOVASCULAR EXAMINATION: ICD-10-CM

## 2020-06-16 LAB
ABO GROUP BLD: NORMAL
ALBUMIN SERPL BCP-MCNC: 4.2 G/DL (ref 3.2–4.9)
ALBUMIN/GLOB SERPL: 1.7 G/DL
ALP SERPL-CCNC: 67 U/L (ref 30–99)
ALT SERPL-CCNC: 16 U/L (ref 2–50)
ANION GAP SERPL CALC-SCNC: 9 MMOL/L (ref 7–16)
APPEARANCE UR: CLEAR
APTT PPP: 26.2 SEC (ref 24.7–36)
AST SERPL-CCNC: 18 U/L (ref 12–45)
BACTERIA #/AREA URNS HPF: NEGATIVE /HPF
BASOPHILS # BLD AUTO: 0.7 % (ref 0–1.8)
BASOPHILS # BLD: 0.05 K/UL (ref 0–0.12)
BILIRUB SERPL-MCNC: 0.4 MG/DL (ref 0.1–1.5)
BILIRUB UR QL STRIP.AUTO: NEGATIVE
BLD GP AB SCN SERPL QL: NORMAL
BUN SERPL-MCNC: 12 MG/DL (ref 8–22)
CALCIUM SERPL-MCNC: 9.4 MG/DL (ref 8.5–10.5)
CHLORIDE SERPL-SCNC: 101 MMOL/L (ref 96–112)
CO2 SERPL-SCNC: 22 MMOL/L (ref 20–33)
COLOR UR: YELLOW
CREAT SERPL-MCNC: 0.73 MG/DL (ref 0.5–1.4)
EKG IMPRESSION: NORMAL
EOSINOPHIL # BLD AUTO: 0.03 K/UL (ref 0–0.51)
EOSINOPHIL NFR BLD: 0.4 % (ref 0–6.9)
EPI CELLS #/AREA URNS HPF: NEGATIVE /HPF
ERYTHROCYTE [DISTWIDTH] IN BLOOD BY AUTOMATED COUNT: 43.4 FL (ref 35.9–50)
GLOBULIN SER CALC-MCNC: 2.5 G/DL (ref 1.9–3.5)
GLUCOSE SERPL-MCNC: 126 MG/DL (ref 65–99)
GLUCOSE UR STRIP.AUTO-MCNC: NEGATIVE MG/DL
HCT VFR BLD AUTO: 43.7 % (ref 37–47)
HGB BLD-MCNC: 14.3 G/DL (ref 12–16)
HYALINE CASTS #/AREA URNS LPF: ABNORMAL /LPF
IMM GRANULOCYTES # BLD AUTO: 0.02 K/UL (ref 0–0.11)
IMM GRANULOCYTES NFR BLD AUTO: 0.3 % (ref 0–0.9)
INR PPP: 0.94 (ref 0.87–1.13)
KETONES UR STRIP.AUTO-MCNC: NEGATIVE MG/DL
LEUKOCYTE ESTERASE UR QL STRIP.AUTO: ABNORMAL
LYMPHOCYTES # BLD AUTO: 1.46 K/UL (ref 1–4.8)
LYMPHOCYTES NFR BLD: 19.8 % (ref 22–41)
MCH RBC QN AUTO: 29.4 PG (ref 27–33)
MCHC RBC AUTO-ENTMCNC: 32.7 G/DL (ref 33.6–35)
MCV RBC AUTO: 89.9 FL (ref 81.4–97.8)
MICRO URNS: ABNORMAL
MONOCYTES # BLD AUTO: 0.43 K/UL (ref 0–0.85)
MONOCYTES NFR BLD AUTO: 5.8 % (ref 0–13.4)
NEUTROPHILS # BLD AUTO: 5.37 K/UL (ref 2–7.15)
NEUTROPHILS NFR BLD: 73 % (ref 44–72)
NITRITE UR QL STRIP.AUTO: NEGATIVE
NRBC # BLD AUTO: 0 K/UL
NRBC BLD-RTO: 0 /100 WBC
PH UR STRIP.AUTO: 5 [PH] (ref 5–8)
PLATELET # BLD AUTO: 194 K/UL (ref 164–446)
PMV BLD AUTO: 12.7 FL (ref 9–12.9)
POTASSIUM SERPL-SCNC: 4 MMOL/L (ref 3.6–5.5)
PROT SERPL-MCNC: 6.7 G/DL (ref 6–8.2)
PROT UR QL STRIP: NEGATIVE MG/DL
PROTHROMBIN TIME: 12.8 SEC (ref 12–14.6)
RBC # BLD AUTO: 4.86 M/UL (ref 4.2–5.4)
RBC # URNS HPF: ABNORMAL /HPF
RBC UR QL AUTO: ABNORMAL
RH BLD: NORMAL
SODIUM SERPL-SCNC: 132 MMOL/L (ref 135–145)
SP GR UR STRIP.AUTO: 1.02
UROBILINOGEN UR STRIP.AUTO-MCNC: 0.2 MG/DL
WBC # BLD AUTO: 7.4 K/UL (ref 4.8–10.8)
WBC #/AREA URNS HPF: ABNORMAL /HPF

## 2020-06-16 PROCEDURE — 93005 ELECTROCARDIOGRAM TRACING: CPT | Performed by: THORACIC SURGERY (CARDIOTHORACIC VASCULAR SURGERY)

## 2020-06-16 PROCEDURE — 86850 RBC ANTIBODY SCREEN: CPT

## 2020-06-16 PROCEDURE — 83036 HEMOGLOBIN GLYCOSYLATED A1C: CPT

## 2020-06-16 PROCEDURE — 86900 BLOOD TYPING SEROLOGIC ABO: CPT

## 2020-06-16 PROCEDURE — 80053 COMPREHEN METABOLIC PANEL: CPT

## 2020-06-16 PROCEDURE — 85730 THROMBOPLASTIN TIME PARTIAL: CPT

## 2020-06-16 PROCEDURE — 81001 URINALYSIS AUTO W/SCOPE: CPT

## 2020-06-16 PROCEDURE — 71046 X-RAY EXAM CHEST 2 VIEWS: CPT

## 2020-06-16 PROCEDURE — 85610 PROTHROMBIN TIME: CPT

## 2020-06-16 PROCEDURE — 85025 COMPLETE CBC W/AUTO DIFF WBC: CPT

## 2020-06-16 PROCEDURE — 86901 BLOOD TYPING SEROLOGIC RH(D): CPT

## 2020-06-16 RX ORDER — EPINEPHRINE HCL IN 0.9 % NACL 4MG/250ML
0-.2 PLASTIC BAG, INJECTION (ML) INTRAVENOUS CONTINUOUS
Status: DISCONTINUED | OUTPATIENT
Start: 2020-06-17 | End: 2020-06-17

## 2020-06-16 RX ORDER — NOREPINEPHRINE BITARTRATE 0.03 MG/ML
0-30 INJECTION, SOLUTION INTRAVENOUS CONTINUOUS
Status: DISCONTINUED | OUTPATIENT
Start: 2020-06-17 | End: 2020-06-17

## 2020-06-16 RX ORDER — DEXMEDETOMIDINE HYDROCHLORIDE 4 UG/ML
0-1.5 INJECTION, SOLUTION INTRAVENOUS CONTINUOUS
Status: DISCONTINUED | OUTPATIENT
Start: 2020-06-17 | End: 2020-06-17

## 2020-06-16 ASSESSMENT — FIBROSIS 4 INDEX
FIB4 SCORE: 1.97
FIB4 SCORE: 1.97

## 2020-06-16 NOTE — OR NURSING
COVID-19 Pre-surgery screenin. Do you have an undiagnosed respiratory illness or symptoms such as coughing or sneezing? no (Yes/No)  a. Onset of Sx no  b. Acute vs. chronic respiratory illness no    2. Do you have an unexplained fever greater than 100.4 degrees Fahrenheit or 38 degrees Celsius?     no (Yes/No)    3. Have you had direct exposure to a patient who tested positive for Covid-19?    no (Yes/No)    4. Have you traveled outside [facility] in the last 14 days? no    5. Have you had any loss of your sense of taste or smell? Have you had N/V or sore throat? no    Patient has been informed of visitor policy and asked to wear a mask upon entering the hospital   Yes (Yes/No)

## 2020-06-17 ENCOUNTER — APPOINTMENT (OUTPATIENT)
Dept: CARDIOLOGY | Facility: MEDICAL CENTER | Age: 75
DRG: 219 | End: 2020-06-17
Attending: THORACIC SURGERY (CARDIOTHORACIC VASCULAR SURGERY)
Payer: MEDICARE

## 2020-06-17 ENCOUNTER — ANESTHESIA EVENT (OUTPATIENT)
Dept: SURGERY | Facility: MEDICAL CENTER | Age: 75
DRG: 219 | End: 2020-06-17
Payer: MEDICARE

## 2020-06-17 ENCOUNTER — APPOINTMENT (OUTPATIENT)
Dept: RADIOLOGY | Facility: MEDICAL CENTER | Age: 75
DRG: 219 | End: 2020-06-17
Attending: THORACIC SURGERY (CARDIOTHORACIC VASCULAR SURGERY)
Payer: MEDICARE

## 2020-06-17 ENCOUNTER — APPOINTMENT (OUTPATIENT)
Dept: RADIOLOGY | Facility: MEDICAL CENTER | Age: 75
DRG: 219 | End: 2020-06-17
Attending: ANESTHESIOLOGY
Payer: MEDICARE

## 2020-06-17 ENCOUNTER — APPOINTMENT (OUTPATIENT)
Dept: RADIOLOGY | Facility: MEDICAL CENTER | Age: 75
DRG: 219 | End: 2020-06-17
Attending: PHYSICIAN ASSISTANT
Payer: MEDICARE

## 2020-06-17 ENCOUNTER — HOSPITAL ENCOUNTER (INPATIENT)
Facility: MEDICAL CENTER | Age: 75
LOS: 6 days | DRG: 219 | End: 2020-06-23
Attending: THORACIC SURGERY (CARDIOTHORACIC VASCULAR SURGERY) | Admitting: THORACIC SURGERY (CARDIOTHORACIC VASCULAR SURGERY)
Payer: MEDICARE

## 2020-06-17 ENCOUNTER — ANESTHESIA (OUTPATIENT)
Dept: SURGERY | Facility: MEDICAL CENTER | Age: 75
DRG: 219 | End: 2020-06-17
Payer: MEDICARE

## 2020-06-17 DIAGNOSIS — Z98.890 S/P MITRAL VALVE REPAIR: ICD-10-CM

## 2020-06-17 DIAGNOSIS — I34.0 SEVERE MITRAL REGURGITATION: ICD-10-CM

## 2020-06-17 DIAGNOSIS — I48.0 PAROXYSMAL ATRIAL FIBRILLATION (HCC): ICD-10-CM

## 2020-06-17 DIAGNOSIS — G89.18 ACUTE POSTOPERATIVE PAIN: ICD-10-CM

## 2020-06-17 LAB
ABO + RH BLD: NORMAL
ACT BLD: 103 SEC (ref 74–137)
ACT BLD: 125 SEC (ref 74–137)
ACT BLD: 378 SEC (ref 74–137)
ACT BLD: 505 SEC (ref 74–137)
ACT BLD: 692 SEC (ref 74–137)
ACTION RANGE TRIGGERED IACRT: NO
ACTION RANGE TRIGGERED IACRT: YES
APTT PPP: 31.6 SEC (ref 24.7–36)
BASE EXCESS BLDA CALC-SCNC: -2 MMOL/L (ref -4–3)
BASE EXCESS BLDA CALC-SCNC: -2 MMOL/L (ref -4–3)
BASE EXCESS BLDA CALC-SCNC: -3 MMOL/L (ref -4–3)
BASE EXCESS BLDA CALC-SCNC: -3 MMOL/L (ref -4–3)
BASE EXCESS BLDA CALC-SCNC: -4 MMOL/L (ref -4–3)
BASE EXCESS BLDA CALC-SCNC: -6 MMOL/L (ref -4–3)
BASE EXCESS BLDA CALC-SCNC: -8 MMOL/L (ref -4–3)
BASE EXCESS BLDA CALC-SCNC: -9 MMOL/L (ref -4–3)
BASE EXCESS BLDA CALC-SCNC: 1 MMOL/L (ref -4–3)
BASE EXCESS BLDA CALC-SCNC: 2 MMOL/L (ref -4–3)
BASE EXCESS BLDV CALC-SCNC: -1 MMOL/L (ref -4–3)
BODY TEMPERATURE: ABNORMAL DEGREES
CA-I BLD ISE-SCNC: 0.91 MMOL/L (ref 1.1–1.3)
CA-I BLD ISE-SCNC: 0.92 MMOL/L (ref 1.1–1.3)
CA-I BLD ISE-SCNC: 0.92 MMOL/L (ref 1.1–1.3)
CA-I BLD ISE-SCNC: 0.94 MMOL/L (ref 1.1–1.3)
CA-I BLD ISE-SCNC: 0.95 MMOL/L (ref 1.1–1.3)
CA-I BLD ISE-SCNC: 0.97 MMOL/L (ref 1.1–1.3)
CA-I BLD ISE-SCNC: 1.18 MMOL/L (ref 1.1–1.3)
CO2 BLDA-SCNC: 16 MMOL/L (ref 20–33)
CO2 BLDA-SCNC: 18 MMOL/L (ref 20–33)
CO2 BLDA-SCNC: 20 MMOL/L (ref 20–33)
CO2 BLDA-SCNC: 20 MMOL/L (ref 20–33)
CO2 BLDA-SCNC: 23 MMOL/L (ref 20–33)
CO2 BLDA-SCNC: 23 MMOL/L (ref 20–33)
CO2 BLDA-SCNC: 24 MMOL/L (ref 20–33)
CO2 BLDA-SCNC: 24 MMOL/L (ref 20–33)
CO2 BLDA-SCNC: 26 MMOL/L (ref 20–33)
CO2 BLDA-SCNC: 27 MMOL/L (ref 20–33)
CO2 BLDV-SCNC: 24 MMOL/L (ref 20–33)
EKG IMPRESSION: NORMAL
EST. AVERAGE GLUCOSE BLD GHB EST-MCNC: 114 MG/DL
GLUCOSE BLD-MCNC: 132 MG/DL (ref 65–99)
GLUCOSE BLD-MCNC: 132 MG/DL (ref 65–99)
GLUCOSE BLD-MCNC: 150 MG/DL (ref 65–99)
GLUCOSE BLD-MCNC: 157 MG/DL (ref 65–99)
GLUCOSE BLD-MCNC: 169 MG/DL (ref 65–99)
GLUCOSE BLD-MCNC: 170 MG/DL (ref 65–99)
GLUCOSE BLD-MCNC: 194 MG/DL (ref 65–99)
GLUCOSE BLD-MCNC: 225 MG/DL (ref 65–99)
GLUCOSE BLD-MCNC: 82 MG/DL (ref 65–99)
GLUCOSE BLD-MCNC: 94 MG/DL (ref 65–99)
GLUCOSE BLD-MCNC: 95 MG/DL (ref 65–99)
GLUCOSE BLD-MCNC: 95 MG/DL (ref 65–99)
GLUCOSE BLD-MCNC: 97 MG/DL (ref 65–99)
GLUCOSE BLD-MCNC: 97 MG/DL (ref 65–99)
HBA1C MFR BLD: 5.6 % (ref 0–5.6)
HCO3 BLDA-SCNC: 15.6 MMOL/L (ref 17–25)
HCO3 BLDA-SCNC: 16.7 MMOL/L (ref 17–25)
HCO3 BLDA-SCNC: 19.1 MMOL/L (ref 17–25)
HCO3 BLDA-SCNC: 19.2 MMOL/L (ref 17–25)
HCO3 BLDA-SCNC: 21.9 MMOL/L (ref 17–25)
HCO3 BLDA-SCNC: 22.4 MMOL/L (ref 17–25)
HCO3 BLDA-SCNC: 22.6 MMOL/L (ref 17–25)
HCO3 BLDA-SCNC: 22.7 MMOL/L (ref 17–25)
HCO3 BLDA-SCNC: 25.1 MMOL/L (ref 17–25)
HCO3 BLDA-SCNC: 25.6 MMOL/L (ref 17–25)
HCO3 BLDV-SCNC: 23.4 MMOL/L (ref 24–28)
HCT VFR BLD CALC: 20 % (ref 37–47)
HCT VFR BLD CALC: 20 % (ref 37–47)
HCT VFR BLD CALC: 22 % (ref 37–47)
HCT VFR BLD CALC: 22 % (ref 37–47)
HCT VFR BLD CALC: 23 % (ref 37–47)
HCT VFR BLD CALC: 31 % (ref 37–47)
HCT VFR BLD CALC: 31 % (ref 37–47)
HCT VFR BLD CALC: 33 % (ref 37–47)
HGB BLD-MCNC: 10.5 G/DL (ref 12–16)
HGB BLD-MCNC: 10.5 G/DL (ref 12–16)
HGB BLD-MCNC: 11.2 G/DL (ref 12–16)
HGB BLD-MCNC: 6.8 G/DL (ref 12–16)
HGB BLD-MCNC: 6.8 G/DL (ref 12–16)
HGB BLD-MCNC: 7.5 G/DL (ref 12–16)
HGB BLD-MCNC: 7.5 G/DL (ref 12–16)
HGB BLD-MCNC: 7.8 G/DL (ref 12–16)
HOROWITZ INDEX BLDA+IHG-RTO: 249 MM[HG]
HOROWITZ INDEX BLDA+IHG-RTO: 367 MM[HG]
HOROWITZ INDEX BLDA+IHG-RTO: 410 MM[HG]
HOROWITZ INDEX BLDA+IHG-RTO: 430 MM[HG]
HOROWITZ INDEX BLDA+IHG-RTO: 435 MM[HG]
HOROWITZ INDEX BLDA+IHG-RTO: 454 MM[HG]
HOROWITZ INDEX BLDA+IHG-RTO: 460 MM[HG]
INR PPP: 1.3 (ref 0.87–1.13)
INST. QUALIFIED PATIENT IIQPT: YES
MAGNESIUM SERPL-MCNC: 3 MG/DL (ref 1.5–2.5)
O2/TOTAL GAS SETTING VFR VENT: 100 %
O2/TOTAL GAS SETTING VFR VENT: 100 %
O2/TOTAL GAS SETTING VFR VENT: 30 %
O2/TOTAL GAS SETTING VFR VENT: 30 %
O2/TOTAL GAS SETTING VFR VENT: 50 %
O2/TOTAL GAS SETTING VFR VENT: 70 %
O2/TOTAL GAS SETTING VFR VENT: 70 %
PATHOLOGY CONSULT NOTE: NORMAL
PCO2 BLDA: 29.1 MMHG (ref 26–37)
PCO2 BLDA: 29.4 MMHG (ref 26–37)
PCO2 BLDA: 32.1 MMHG (ref 26–37)
PCO2 BLDA: 32.7 MMHG (ref 26–37)
PCO2 BLDA: 34.7 MMHG (ref 26–37)
PCO2 BLDA: 34.9 MMHG (ref 26–37)
PCO2 BLDA: 35.3 MMHG (ref 26–37)
PCO2 BLDA: 36.9 MMHG (ref 26–37)
PCO2 BLDA: 39.7 MMHG (ref 26–37)
PCO2 BLDA: 43 MMHG (ref 26–37)
PCO2 BLDV: 34.7 MMHG (ref 41–51)
PCO2 TEMP ADJ BLDA: 27.2 MMHG (ref 26–37)
PCO2 TEMP ADJ BLDA: 30 MMHG (ref 26–37)
PCO2 TEMP ADJ BLDA: 30.9 MMHG (ref 26–37)
PCO2 TEMP ADJ BLDA: 33.2 MMHG (ref 26–37)
PCO2 TEMP ADJ BLDA: 34.7 MMHG (ref 26–37)
PCO2 TEMP ADJ BLDA: 35.3 MMHG (ref 26–37)
PCO2 TEMP ADJ BLDA: 39.7 MMHG (ref 26–37)
PH BLDA: 7.32 [PH] (ref 7.4–7.5)
PH BLDA: 7.33 [PH] (ref 7.4–7.5)
PH BLDA: 7.33 [PH] (ref 7.4–7.5)
PH BLDA: 7.35 [PH] (ref 7.4–7.5)
PH BLDA: 7.39 [PH] (ref 7.4–7.5)
PH BLDA: 7.4 [PH] (ref 7.4–7.5)
PH BLDA: 7.42 [PH] (ref 7.4–7.5)
PH BLDA: 7.49 [PH] (ref 7.4–7.5)
PH BLDV: 7.44 [PH] (ref 7.31–7.45)
PH TEMP ADJ BLDA: 7.33 [PH] (ref 7.4–7.5)
PH TEMP ADJ BLDA: 7.34 [PH] (ref 7.4–7.5)
PH TEMP ADJ BLDA: 7.35 [PH] (ref 7.4–7.5)
PH TEMP ADJ BLDA: 7.4 [PH] (ref 7.4–7.5)
PH TEMP ADJ BLDA: 7.42 [PH] (ref 7.4–7.5)
PH TEMP ADJ BLDA: 7.44 [PH] (ref 7.4–7.5)
PH TEMP ADJ BLDA: 7.45 [PH] (ref 7.4–7.5)
PLATELET # BLD AUTO: 108 K/UL (ref 164–446)
PO2 BLDA: 110 MMHG (ref 64–87)
PO2 BLDA: 138 MMHG (ref 64–87)
PO2 BLDA: 205 MMHG (ref 64–87)
PO2 BLDA: 249 MMHG (ref 64–87)
PO2 BLDA: 269 MMHG (ref 64–87)
PO2 BLDA: 301 MMHG (ref 64–87)
PO2 BLDA: 318 MMHG (ref 64–87)
PO2 BLDA: 335 MMHG (ref 64–87)
PO2 BLDA: 352 MMHG (ref 64–87)
PO2 BLDA: 435 MMHG (ref 64–87)
PO2 BLDV: 48 MMHG (ref 25–40)
PO2 TEMP ADJ BLDA: 110 MMHG (ref 64–87)
PO2 TEMP ADJ BLDA: 141 MMHG (ref 64–87)
PO2 TEMP ADJ BLDA: 201 MMHG (ref 64–87)
PO2 TEMP ADJ BLDA: 249 MMHG (ref 64–87)
PO2 TEMP ADJ BLDA: 301 MMHG (ref 64–87)
PO2 TEMP ADJ BLDA: 310 MMHG (ref 64–87)
PO2 TEMP ADJ BLDA: 429 MMHG (ref 64–87)
POTASSIUM BLD-SCNC: 3.7 MMOL/L (ref 3.6–5.5)
POTASSIUM BLD-SCNC: 4.1 MMOL/L (ref 3.6–5.5)
POTASSIUM BLD-SCNC: 4.5 MMOL/L (ref 3.6–5.5)
POTASSIUM BLD-SCNC: 5 MMOL/L (ref 3.6–5.5)
POTASSIUM BLD-SCNC: 5.1 MMOL/L (ref 3.6–5.5)
POTASSIUM BLD-SCNC: 5.2 MMOL/L (ref 3.6–5.5)
POTASSIUM BLD-SCNC: 5.3 MMOL/L (ref 3.6–5.5)
POTASSIUM SERPL-SCNC: 3.9 MMOL/L (ref 3.6–5.5)
POTASSIUM SERPL-SCNC: 4.2 MMOL/L (ref 3.6–5.5)
PROTHROMBIN TIME: 16.6 SEC (ref 12–14.6)
SAO2 % BLDA: 100 % (ref 93–99)
SAO2 % BLDA: 98 % (ref 93–99)
SAO2 % BLDA: 99 % (ref 93–99)
SAO2 % BLDV: 85 %
SODIUM BLD-SCNC: 132 MMOL/L (ref 135–145)
SODIUM BLD-SCNC: 137 MMOL/L (ref 135–145)
SODIUM BLD-SCNC: 138 MMOL/L (ref 135–145)
SODIUM BLD-SCNC: 138 MMOL/L (ref 135–145)
SODIUM BLD-SCNC: 139 MMOL/L (ref 135–145)
SPECIMEN DRAWN FROM PATIENT: ABNORMAL

## 2020-06-17 PROCEDURE — 501745 HCHG WIRE, SURGICAL STEEL: Performed by: THORACIC SURGERY (CARDIOTHORACIC VASCULAR SURGERY)

## 2020-06-17 PROCEDURE — 700102 HCHG RX REV CODE 250 W/ 637 OVERRIDE(OP): Performed by: THORACIC SURGERY (CARDIOTHORACIC VASCULAR SURGERY)

## 2020-06-17 PROCEDURE — 99291 CRITICAL CARE FIRST HOUR: CPT | Performed by: INTERNAL MEDICINE

## 2020-06-17 PROCEDURE — 500002 HCHG ADHESIVE, DERMABOND: Performed by: THORACIC SURGERY (CARDIOTHORACIC VASCULAR SURGERY)

## 2020-06-17 PROCEDURE — 71045 X-RAY EXAM CHEST 1 VIEW: CPT

## 2020-06-17 PROCEDURE — 02B70ZK EXCISION OF LEFT ATRIAL APPENDAGE, OPEN APPROACH: ICD-10-PCS | Performed by: THORACIC SURGERY (CARDIOTHORACIC VASCULAR SURGERY)

## 2020-06-17 PROCEDURE — 85049 AUTOMATED PLATELET COUNT: CPT

## 2020-06-17 PROCEDURE — 503001 HCHG PERFUSION: Performed by: THORACIC SURGERY (CARDIOTHORACIC VASCULAR SURGERY)

## 2020-06-17 PROCEDURE — 94002 VENT MGMT INPAT INIT DAY: CPT

## 2020-06-17 PROCEDURE — 5A1221Z PERFORMANCE OF CARDIAC OUTPUT, CONTINUOUS: ICD-10-PCS | Performed by: THORACIC SURGERY (CARDIOTHORACIC VASCULAR SURGERY)

## 2020-06-17 PROCEDURE — P9047 ALBUMIN (HUMAN), 25%, 50ML: HCPCS | Mod: JG

## 2020-06-17 PROCEDURE — 700111 HCHG RX REV CODE 636 W/ 250 OVERRIDE (IP): Performed by: ANESTHESIOLOGY

## 2020-06-17 PROCEDURE — 160042 HCHG SURGERY MINUTES - EA ADDL 1 MIN LEVEL 5: Performed by: THORACIC SURGERY (CARDIOTHORACIC VASCULAR SURGERY)

## 2020-06-17 PROCEDURE — C1729 CATH, DRAINAGE: HCPCS | Performed by: THORACIC SURGERY (CARDIOTHORACIC VASCULAR SURGERY)

## 2020-06-17 PROCEDURE — 93010 ELECTROCARDIOGRAM REPORT: CPT | Performed by: INTERNAL MEDICINE

## 2020-06-17 PROCEDURE — 501879 HCHG BONE PUTTY HEMOSTATIC (59): Performed by: THORACIC SURGERY (CARDIOTHORACIC VASCULAR SURGERY)

## 2020-06-17 PROCEDURE — 02QG0ZZ REPAIR MITRAL VALVE, OPEN APPROACH: ICD-10-PCS | Performed by: THORACIC SURGERY (CARDIOTHORACIC VASCULAR SURGERY)

## 2020-06-17 PROCEDURE — 700111 HCHG RX REV CODE 636 W/ 250 OVERRIDE (IP): Performed by: CLINICAL NURSE SPECIALIST

## 2020-06-17 PROCEDURE — 94150 VITAL CAPACITY TEST: CPT

## 2020-06-17 PROCEDURE — 85730 THROMBOPLASTIN TIME PARTIAL: CPT

## 2020-06-17 PROCEDURE — 770022 HCHG ROOM/CARE - ICU (200)

## 2020-06-17 PROCEDURE — 33427 REPAIR OF MITRAL VALVE: CPT | Mod: AS,22 | Performed by: PHYSICIAN ASSISTANT

## 2020-06-17 PROCEDURE — 503000 HCHG SUTURE, OHS: Performed by: THORACIC SURGERY (CARDIOTHORACIC VASCULAR SURGERY)

## 2020-06-17 PROCEDURE — 501519 HCHG SUTURE, E PACK: Performed by: THORACIC SURGERY (CARDIOTHORACIC VASCULAR SURGERY)

## 2020-06-17 PROCEDURE — C1898 LEAD, PMKR, OTHER THAN TRANS: HCPCS | Performed by: THORACIC SURGERY (CARDIOTHORACIC VASCULAR SURGERY)

## 2020-06-17 PROCEDURE — 88305 TISSUE EXAM BY PATHOLOGIST: CPT

## 2020-06-17 PROCEDURE — 501506 HCHG SUTURE GUIDE, VALVE REPLACEMENT: Performed by: THORACIC SURGERY (CARDIOTHORACIC VASCULAR SURGERY)

## 2020-06-17 PROCEDURE — 700101 HCHG RX REV CODE 250

## 2020-06-17 PROCEDURE — A9270 NON-COVERED ITEM OR SERVICE: HCPCS | Performed by: PHYSICIAN ASSISTANT

## 2020-06-17 PROCEDURE — 500890 HCHG PACK, OPEN HEART: Performed by: THORACIC SURGERY (CARDIOTHORACIC VASCULAR SURGERY)

## 2020-06-17 PROCEDURE — 88304 TISSUE EXAM BY PATHOLOGIST: CPT

## 2020-06-17 PROCEDURE — 700111 HCHG RX REV CODE 636 W/ 250 OVERRIDE (IP): Performed by: PHYSICIAN ASSISTANT

## 2020-06-17 PROCEDURE — A6402 STERILE GAUZE <= 16 SQ IN: HCPCS | Performed by: THORACIC SURGERY (CARDIOTHORACIC VASCULAR SURGERY)

## 2020-06-17 PROCEDURE — 700111 HCHG RX REV CODE 636 W/ 250 OVERRIDE (IP): Performed by: NURSE PRACTITIONER

## 2020-06-17 PROCEDURE — 500734 HCHG INSERT, STEALTH: Performed by: THORACIC SURGERY (CARDIOTHORACIC VASCULAR SURGERY)

## 2020-06-17 PROCEDURE — 85610 PROTHROMBIN TIME: CPT

## 2020-06-17 PROCEDURE — 700105 HCHG RX REV CODE 258: Performed by: PHYSICIAN ASSISTANT

## 2020-06-17 PROCEDURE — 700101 HCHG RX REV CODE 250: Performed by: ANESTHESIOLOGY

## 2020-06-17 PROCEDURE — 700111 HCHG RX REV CODE 636 W/ 250 OVERRIDE (IP): Performed by: INTERNAL MEDICINE

## 2020-06-17 PROCEDURE — 83735 ASSAY OF MAGNESIUM: CPT

## 2020-06-17 PROCEDURE — 700102 HCHG RX REV CODE 250 W/ 637 OVERRIDE(OP): Performed by: PHYSICIAN ASSISTANT

## 2020-06-17 PROCEDURE — 700105 HCHG RX REV CODE 258: Performed by: INTERNAL MEDICINE

## 2020-06-17 PROCEDURE — 02BG0ZZ EXCISION OF MITRAL VALVE, OPEN APPROACH: ICD-10-PCS | Performed by: THORACIC SURGERY (CARDIOTHORACIC VASCULAR SURGERY)

## 2020-06-17 PROCEDURE — 160009 HCHG ANES TIME/MIN: Performed by: THORACIC SURGERY (CARDIOTHORACIC VASCULAR SURGERY)

## 2020-06-17 PROCEDURE — 93005 ELECTROCARDIOGRAM TRACING: CPT | Performed by: PHYSICIAN ASSISTANT

## 2020-06-17 PROCEDURE — 85014 HEMATOCRIT: CPT | Mod: 91

## 2020-06-17 PROCEDURE — 700111 HCHG RX REV CODE 636 W/ 250 OVERRIDE (IP): Mod: JG

## 2020-06-17 PROCEDURE — 37799 UNLISTED PX VASCULAR SURGERY: CPT

## 2020-06-17 PROCEDURE — 82330 ASSAY OF CALCIUM: CPT

## 2020-06-17 PROCEDURE — 5A1213Z PERFORMANCE OF CARDIAC PACING, INTERMITTENT: ICD-10-PCS | Performed by: THORACIC SURGERY (CARDIOTHORACIC VASCULAR SURGERY)

## 2020-06-17 PROCEDURE — B24BZZ4 ULTRASONOGRAPHY OF HEART WITH AORTA, TRANSESOPHAGEAL: ICD-10-PCS | Performed by: THORACIC SURGERY (CARDIOTHORACIC VASCULAR SURGERY)

## 2020-06-17 PROCEDURE — C1725 CATH, TRANSLUMIN NON-LASER: HCPCS | Performed by: THORACIC SURGERY (CARDIOTHORACIC VASCULAR SURGERY)

## 2020-06-17 PROCEDURE — 82947 ASSAY GLUCOSE BLOOD QUANT: CPT | Mod: 91

## 2020-06-17 PROCEDURE — 84295 ASSAY OF SERUM SODIUM: CPT | Mod: 91

## 2020-06-17 PROCEDURE — 82962 GLUCOSE BLOOD TEST: CPT | Mod: 91

## 2020-06-17 PROCEDURE — A9270 NON-COVERED ITEM OR SERVICE: HCPCS | Performed by: THORACIC SURGERY (CARDIOTHORACIC VASCULAR SURGERY)

## 2020-06-17 PROCEDURE — 700101 HCHG RX REV CODE 250: Performed by: PHYSICIAN ASSISTANT

## 2020-06-17 PROCEDURE — 84132 ASSAY OF SERUM POTASSIUM: CPT | Mod: 91

## 2020-06-17 PROCEDURE — 160031 HCHG SURGERY MINUTES - 1ST 30 MINS LEVEL 5: Performed by: THORACIC SURGERY (CARDIOTHORACIC VASCULAR SURGERY)

## 2020-06-17 PROCEDURE — 700102 HCHG RX REV CODE 250 W/ 637 OVERRIDE(OP): Performed by: CLINICAL NURSE SPECIALIST

## 2020-06-17 PROCEDURE — 160048 HCHG OR STATISTICAL LEVEL 1-5: Performed by: THORACIC SURGERY (CARDIOTHORACIC VASCULAR SURGERY)

## 2020-06-17 PROCEDURE — 110372 HCHG SHELL REV 278: Performed by: THORACIC SURGERY (CARDIOTHORACIC VASCULAR SURGERY)

## 2020-06-17 PROCEDURE — 94669 MECHANICAL CHEST WALL OSCILL: CPT

## 2020-06-17 PROCEDURE — 82803 BLOOD GASES ANY COMBINATION: CPT | Mod: 91

## 2020-06-17 PROCEDURE — 33427 REPAIR OF MITRAL VALVE: CPT | Mod: 22 | Performed by: THORACIC SURGERY (CARDIOTHORACIC VASCULAR SURGERY)

## 2020-06-17 PROCEDURE — 700105 HCHG RX REV CODE 258: Performed by: CLINICAL NURSE SPECIALIST

## 2020-06-17 PROCEDURE — 93312 ECHO TRANSESOPHAGEAL: CPT

## 2020-06-17 PROCEDURE — 700101 HCHG RX REV CODE 250: Performed by: THORACIC SURGERY (CARDIOTHORACIC VASCULAR SURGERY)

## 2020-06-17 PROCEDURE — P9045 ALBUMIN (HUMAN), 5%, 250 ML: HCPCS | Mod: JG | Performed by: NURSE PRACTITIONER

## 2020-06-17 PROCEDURE — 85347 COAGULATION TIME ACTIVATED: CPT | Mod: 91

## 2020-06-17 DEVICE — ANNULO. RING MITRAL 4600-34 ---ALWAYS SHIP OVERNIGHT---: Type: IMPLANTABLE DEVICE | Status: FUNCTIONAL

## 2020-06-17 DEVICE — BONE PUTTY HEMOSTATIC ABSORBABLE (12/BX): Type: IMPLANTABLE DEVICE | Status: FUNCTIONAL

## 2020-06-17 RX ORDER — DIPHENHYDRAMINE HCL 25 MG
25 TABLET ORAL
Status: DISCONTINUED | OUTPATIENT
Start: 2020-06-17 | End: 2020-06-23 | Stop reason: HOSPADM

## 2020-06-17 RX ORDER — DEXMEDETOMIDINE HYDROCHLORIDE 4 UG/ML
INJECTION, SOLUTION INTRAVENOUS
Status: DISCONTINUED | OUTPATIENT
Start: 2020-06-17 | End: 2020-06-17

## 2020-06-17 RX ORDER — POTASSIUM CHLORIDE 7.45 MG/ML
10 INJECTION INTRAVENOUS ONCE
Status: COMPLETED | OUTPATIENT
Start: 2020-06-17 | End: 2020-06-17

## 2020-06-17 RX ORDER — PROMETHAZINE HYDROCHLORIDE 25 MG/1
25 SUPPOSITORY RECTAL EVERY 6 HOURS PRN
Status: DISCONTINUED | OUTPATIENT
Start: 2020-06-17 | End: 2020-06-23 | Stop reason: HOSPADM

## 2020-06-17 RX ORDER — GABAPENTIN 100 MG/1
100 CAPSULE ORAL EVERY EVENING
Status: DISCONTINUED | OUTPATIENT
Start: 2020-06-22 | End: 2020-06-18

## 2020-06-17 RX ORDER — POTASSIUM CHLORIDE 29.8 MG/ML
40 INJECTION INTRAVENOUS ONCE
Status: COMPLETED | OUTPATIENT
Start: 2020-06-17 | End: 2020-06-17

## 2020-06-17 RX ORDER — MIDAZOLAM HYDROCHLORIDE 1 MG/ML
2 INJECTION INTRAMUSCULAR; INTRAVENOUS
Status: DISCONTINUED | OUTPATIENT
Start: 2020-06-17 | End: 2020-06-17

## 2020-06-17 RX ORDER — LIDOCAINE HYDROCHLORIDE 20 MG/ML
INJECTION, SOLUTION EPIDURAL; INFILTRATION; INTRACAUDAL; PERINEURAL PRN
Status: DISCONTINUED | OUTPATIENT
Start: 2020-06-17 | End: 2020-06-17

## 2020-06-17 RX ORDER — MAGNESIUM SULFATE HEPTAHYDRATE 500 MG/ML
INJECTION, SOLUTION INTRAMUSCULAR; INTRAVENOUS PRN
Status: DISCONTINUED | OUTPATIENT
Start: 2020-06-17 | End: 2020-06-17

## 2020-06-17 RX ORDER — EPINEPHRINE HCL IN 0.9 % NACL 4MG/250ML
0-.2 PLASTIC BAG, INJECTION (ML) INTRAVENOUS CONTINUOUS
Status: DISCONTINUED | OUTPATIENT
Start: 2020-06-17 | End: 2020-06-17

## 2020-06-17 RX ORDER — TRAMADOL HYDROCHLORIDE 50 MG/1
50 TABLET ORAL EVERY 4 HOURS PRN
Status: DISCONTINUED | OUTPATIENT
Start: 2020-06-17 | End: 2020-06-23 | Stop reason: HOSPADM

## 2020-06-17 RX ORDER — NOREPINEPHRINE BITARTRATE 0.03 MG/ML
0-30 INJECTION, SOLUTION INTRAVENOUS CONTINUOUS
Status: DISCONTINUED | OUTPATIENT
Start: 2020-06-17 | End: 2020-06-19

## 2020-06-17 RX ORDER — SODIUM CHLORIDE, SODIUM GLUCONATE, SODIUM ACETATE, POTASSIUM CHLORIDE AND MAGNESIUM CHLORIDE 526; 502; 368; 37; 30 MG/100ML; MG/100ML; MG/100ML; MG/100ML; MG/100ML
INJECTION, SOLUTION INTRAVENOUS PRN
Status: DISCONTINUED | OUTPATIENT
Start: 2020-06-17 | End: 2020-06-18

## 2020-06-17 RX ORDER — POLYETHYLENE GLYCOL 3350 17 G/17G
1 POWDER, FOR SOLUTION ORAL DAILY
Status: DISCONTINUED | OUTPATIENT
Start: 2020-06-18 | End: 2020-06-23 | Stop reason: HOSPADM

## 2020-06-17 RX ORDER — ACETAMINOPHEN 500 MG
1000 TABLET ORAL ONCE
Status: DISCONTINUED | OUTPATIENT
Start: 2020-06-17 | End: 2020-06-17

## 2020-06-17 RX ORDER — GABAPENTIN 300 MG/1
300 CAPSULE ORAL ONCE
Status: COMPLETED | OUTPATIENT
Start: 2020-06-17 | End: 2020-06-17

## 2020-06-17 RX ORDER — GABAPENTIN 100 MG/1
100 CAPSULE ORAL 2 TIMES DAILY
Status: DISCONTINUED | OUTPATIENT
Start: 2020-06-17 | End: 2020-06-18

## 2020-06-17 RX ORDER — EPINEPHRINE HCL IN 0.9 % NACL 4MG/250ML
0-.2 PLASTIC BAG, INJECTION (ML) INTRAVENOUS CONTINUOUS
Status: DISCONTINUED | OUTPATIENT
Start: 2020-06-17 | End: 2020-06-19

## 2020-06-17 RX ORDER — DEXMEDETOMIDINE HYDROCHLORIDE 4 UG/ML
0-1.5 INJECTION, SOLUTION INTRAVENOUS CONTINUOUS
Status: DISCONTINUED | OUTPATIENT
Start: 2020-06-17 | End: 2020-06-19

## 2020-06-17 RX ORDER — NITROGLYCERIN 20 MG/100ML
0-100 INJECTION INTRAVENOUS CONTINUOUS
Status: DISCONTINUED | OUTPATIENT
Start: 2020-06-17 | End: 2020-06-19

## 2020-06-17 RX ORDER — OXYCODONE HYDROCHLORIDE 5 MG/1
5 TABLET ORAL
Status: DISCONTINUED | OUTPATIENT
Start: 2020-06-17 | End: 2020-06-23 | Stop reason: HOSPADM

## 2020-06-17 RX ORDER — SODIUM CHLORIDE 9 MG/ML
INJECTION, SOLUTION INTRAVENOUS
Status: DISCONTINUED
Start: 2020-06-17 | End: 2020-06-17

## 2020-06-17 RX ORDER — BISACODYL 10 MG
10 SUPPOSITORY, RECTAL RECTAL
Status: DISCONTINUED | OUTPATIENT
Start: 2020-06-17 | End: 2020-06-23 | Stop reason: HOSPADM

## 2020-06-17 RX ORDER — SODIUM CHLORIDE 9 MG/ML
INJECTION, SOLUTION INTRAVENOUS CONTINUOUS
Status: DISCONTINUED | OUTPATIENT
Start: 2020-06-17 | End: 2020-06-19

## 2020-06-17 RX ORDER — ALUMINA, MAGNESIA, AND SIMETHICONE 2400; 2400; 240 MG/30ML; MG/30ML; MG/30ML
30 SUSPENSION ORAL EVERY 4 HOURS PRN
Status: DISCONTINUED | OUTPATIENT
Start: 2020-06-17 | End: 2020-06-23 | Stop reason: HOSPADM

## 2020-06-17 RX ORDER — ACETAMINOPHEN 500 MG
1000 TABLET ORAL EVERY 6 HOURS
Status: DISPENSED | OUTPATIENT
Start: 2020-06-17 | End: 2020-06-22

## 2020-06-17 RX ORDER — MAGNESIUM SULFATE 1 G/100ML
1 INJECTION INTRAVENOUS DAILY
Status: COMPLETED | OUTPATIENT
Start: 2020-06-17 | End: 2020-06-19

## 2020-06-17 RX ORDER — MORPHINE SULFATE 4 MG/ML
4 INJECTION, SOLUTION INTRAMUSCULAR; INTRAVENOUS
Status: DISCONTINUED | OUTPATIENT
Start: 2020-06-17 | End: 2020-06-17

## 2020-06-17 RX ORDER — ONDANSETRON 2 MG/ML
8 INJECTION INTRAMUSCULAR; INTRAVENOUS EVERY 6 HOURS PRN
Status: DISCONTINUED | OUTPATIENT
Start: 2020-06-17 | End: 2020-06-23 | Stop reason: HOSPADM

## 2020-06-17 RX ORDER — ALBUMIN, HUMAN INJ 5% 5 %
25 SOLUTION INTRAVENOUS ONCE
Status: COMPLETED | OUTPATIENT
Start: 2020-06-17 | End: 2020-06-17

## 2020-06-17 RX ORDER — OXYCODONE HYDROCHLORIDE 10 MG/1
10 TABLET ORAL
Status: DISCONTINUED | OUTPATIENT
Start: 2020-06-17 | End: 2020-06-23 | Stop reason: HOSPADM

## 2020-06-17 RX ORDER — AMOXICILLIN 250 MG
2 CAPSULE ORAL 2 TIMES DAILY
Status: DISCONTINUED | OUTPATIENT
Start: 2020-06-17 | End: 2020-06-23 | Stop reason: HOSPADM

## 2020-06-17 RX ORDER — DEXTROSE MONOHYDRATE 25 G/50ML
50 INJECTION, SOLUTION INTRAVENOUS PRN
Status: ACTIVE | OUTPATIENT
Start: 2020-06-17 | End: 2020-06-18

## 2020-06-17 RX ORDER — HEPARIN SODIUM,PORCINE 1000/ML
VIAL (ML) INJECTION PRN
Status: DISCONTINUED | OUTPATIENT
Start: 2020-06-17 | End: 2020-06-17

## 2020-06-17 RX ORDER — ONDANSETRON 2 MG/ML
INJECTION INTRAMUSCULAR; INTRAVENOUS PRN
Status: DISCONTINUED | OUTPATIENT
Start: 2020-06-17 | End: 2020-06-17

## 2020-06-17 RX ORDER — DEXAMETHASONE SODIUM PHOSPHATE 4 MG/ML
INJECTION, SOLUTION INTRA-ARTICULAR; INTRALESIONAL; INTRAMUSCULAR; INTRAVENOUS; SOFT TISSUE PRN
Status: DISCONTINUED | OUTPATIENT
Start: 2020-06-17 | End: 2020-06-17

## 2020-06-17 RX ORDER — PROCHLORPERAZINE EDISYLATE 5 MG/ML
10 INJECTION INTRAMUSCULAR; INTRAVENOUS EVERY 6 HOURS PRN
Status: DISCONTINUED | OUTPATIENT
Start: 2020-06-17 | End: 2020-06-23 | Stop reason: HOSPADM

## 2020-06-17 RX ORDER — ROCURONIUM BROMIDE 10 MG/ML
INJECTION, SOLUTION INTRAVENOUS PRN
Status: DISCONTINUED | OUTPATIENT
Start: 2020-06-17 | End: 2020-06-17

## 2020-06-17 RX ADMIN — GABAPENTIN 100 MG: 100 CAPSULE ORAL at 18:11

## 2020-06-17 RX ADMIN — LIDOCAINE HYDROCHLORIDE 0.5 ML: 10 INJECTION, SOLUTION EPIDURAL; INFILTRATION; INTRACAUDAL at 06:17

## 2020-06-17 RX ADMIN — FENTANYL CITRATE 200 MCG: 50 INJECTION INTRAMUSCULAR; INTRAVENOUS at 08:25

## 2020-06-17 RX ADMIN — DEXAMETHASONE SODIUM PHOSPHATE 4 MG: 4 INJECTION, SOLUTION INTRA-ARTICULAR; INTRALESIONAL; INTRAMUSCULAR; INTRAVENOUS; SOFT TISSUE at 09:25

## 2020-06-17 RX ADMIN — POTASSIUM CHLORIDE 10 MEQ: 7.46 INJECTION, SOLUTION INTRAVENOUS at 12:11

## 2020-06-17 RX ADMIN — POTASSIUM CHLORIDE 10 MEQ: 7.46 INJECTION, SOLUTION INTRAVENOUS at 18:26

## 2020-06-17 RX ADMIN — MAGNESIUM SULFATE HEPTAHYDRATE 2 G: 500 INJECTION, SOLUTION INTRAMUSCULAR; INTRAVENOUS at 08:55

## 2020-06-17 RX ADMIN — CALCIUM CHLORIDE 1000 MG: 100 INJECTION, SOLUTION INTRAVENOUS at 15:15

## 2020-06-17 RX ADMIN — CALCIUM CHLORIDE 1000 MG: 100 INJECTION, SOLUTION INTRAVENOUS at 12:34

## 2020-06-17 RX ADMIN — DOCUSATE SODIUM 50 MG AND SENNOSIDES 8.6 MG 2 TABLET: 8.6; 5 TABLET, FILM COATED ORAL at 18:11

## 2020-06-17 RX ADMIN — OXYCODONE HYDROCHLORIDE 10 MG: 10 TABLET ORAL at 23:44

## 2020-06-17 RX ADMIN — VANCOMYCIN HYDROCHLORIDE 1 G: 1 INJECTION, POWDER, LYOPHILIZED, FOR SOLUTION INTRAVENOUS at 08:05

## 2020-06-17 RX ADMIN — ROCURONIUM BROMIDE 100 MG: 10 INJECTION, SOLUTION INTRAVENOUS at 08:11

## 2020-06-17 RX ADMIN — SODIUM BICARBONATE 50 MEQ: 84 INJECTION INTRAVENOUS at 13:46

## 2020-06-17 RX ADMIN — SODIUM CHLORIDE, SODIUM GLUCONATE, SODIUM ACETATE, POTASSIUM CHLORIDE AND MAGNESIUM CHLORIDE 1000 ML: 526; 502; 368; 37; 30 INJECTION, SOLUTION INTRAVENOUS at 15:55

## 2020-06-17 RX ADMIN — MAGNESIUM SULFATE 1 G: 1 INJECTION INTRAVENOUS at 13:06

## 2020-06-17 RX ADMIN — LIDOCAINE HYDROCHLORIDE 100 MG: 20 INJECTION, SOLUTION EPIDURAL; INFILTRATION; INTRACAUDAL at 08:11

## 2020-06-17 RX ADMIN — MUPIROCIN 1 APPLICATION: 20 OINTMENT TOPICAL at 18:30

## 2020-06-17 RX ADMIN — GABAPENTIN 300 MG: 300 CAPSULE ORAL at 06:17

## 2020-06-17 RX ADMIN — TRAMADOL HYDROCHLORIDE 50 MG: 50 TABLET, FILM COATED ORAL at 20:22

## 2020-06-17 RX ADMIN — POTASSIUM CHLORIDE 40 MEQ: 29.8 INJECTION, SOLUTION INTRAVENOUS at 15:13

## 2020-06-17 RX ADMIN — HEPARIN SODIUM 25000 UNITS: 1000 INJECTION, SOLUTION INTRAVENOUS; SUBCUTANEOUS at 08:54

## 2020-06-17 RX ADMIN — PROPOFOL 140 MG: 10 INJECTION, EMULSION INTRAVENOUS at 08:11

## 2020-06-17 RX ADMIN — SODIUM CHLORIDE, SODIUM GLUCONATE, SODIUM ACETATE, POTASSIUM CHLORIDE AND MAGNESIUM CHLORIDE 1000 ML: 526; 502; 368; 37; 30 INJECTION, SOLUTION INTRAVENOUS at 12:00

## 2020-06-17 RX ADMIN — ACETAMINOPHEN 1000 MG: 500 TABLET ORAL at 18:00

## 2020-06-17 RX ADMIN — DEXMEDETOMIDINE HYDROCHLORIDE 0.5 MCG/KG/HR: 100 INJECTION, SOLUTION INTRAVENOUS at 09:40

## 2020-06-17 RX ADMIN — SODIUM CHLORIDE 3 UNITS/HR: 9 INJECTION, SOLUTION INTRAVENOUS at 12:30

## 2020-06-17 RX ADMIN — VANCOMYCIN HYDROCHLORIDE 1 G: 500 INJECTION, POWDER, LYOPHILIZED, FOR SOLUTION INTRAVENOUS at 19:53

## 2020-06-17 RX ADMIN — ALBUMIN (HUMAN) 25 G: 2.5 SOLUTION INTRAVENOUS at 22:43

## 2020-06-17 RX ADMIN — ONDANSETRON 4 MG: 2 INJECTION INTRAMUSCULAR; INTRAVENOUS at 09:25

## 2020-06-17 RX ADMIN — SODIUM BICARBONATE 50 MEQ: 84 INJECTION INTRAVENOUS at 16:38

## 2020-06-17 RX ADMIN — OXYCODONE 5 MG: 5 TABLET ORAL at 18:11

## 2020-06-17 RX ADMIN — ACETAMINOPHEN 1000 MG: 500 TABLET ORAL at 23:31

## 2020-06-17 RX ADMIN — SODIUM CHLORIDE, SODIUM GLUCONATE, SODIUM ACETATE, POTASSIUM CHLORIDE AND MAGNESIUM CHLORIDE 1000 ML: 526; 502; 368; 37; 30 INJECTION, SOLUTION INTRAVENOUS at 16:58

## 2020-06-17 ASSESSMENT — COPD QUESTIONNAIRES
COPD SCREENING SCORE: 2
DURING THE PAST 4 WEEKS HOW MUCH DID YOU FEEL SHORT OF BREATH: NONE/LITTLE OF THE TIME
DO YOU EVER COUGH UP ANY MUCUS OR PHLEGM?: NO/ONLY WITH OCCASIONAL COLDS OR INFECTIONS
HAVE YOU SMOKED AT LEAST 100 CIGARETTES IN YOUR ENTIRE LIFE: NO/DON'T KNOW

## 2020-06-17 ASSESSMENT — PULMONARY FUNCTION TESTS: FVC: 1.1

## 2020-06-17 ASSESSMENT — FIBROSIS 4 INDEX: FIB4 SCORE: 1.69

## 2020-06-17 NOTE — ANESTHESIA PROCEDURE NOTES
Airway    Date/Time: 6/17/2020 8:12 AM  Performed by: Adam Camacho M.D.  Authorized by: Adam Camacho M.D.     Location:  OR  Urgency:  Elective  Indications for Airway Management:  Anesthesia      Spontaneous Ventilation: absent    Sedation Level:  Deep  Preoxygenated: Yes    Patient Position:  Sniffing  Final Airway Type:  Endotracheal airway  Final Endotracheal Airway:  ETT  Cuffed: Yes    Technique Used for Successful ETT Placement:  Direct laryngoscopy    Insertion Site:  Oral  Blade Type:  Taye  Laryngoscope Blade/Videolaryngoscope Blade Size:  3  ETT Size (mm):  7.5  Measured from:  Teeth  ETT to Teeth (cm):  20  Placement Verified by: auscultation and capnometry    Cormack-Lehane Classification:  Grade IIa - partial view of glottis  Number of Attempts at Approach:  1

## 2020-06-17 NOTE — PROGRESS NOTES
Patient's calcium 1.04 and potassium 3.2. Dr. Wilson updated. Orders received for 1 amp calcium and 40meq of potassium.

## 2020-06-17 NOTE — ASSESSMENT & PLAN NOTE
Status post repair 6/17  Continue post heart protocols  Monitor chest tube output for interval development of coagulopathy or obstruction  Continue to titrate pressors to maintain SBP 90-1 20  Monitor endorgan perfusion  No antiplatelet or anticoagulant therapies at this time  Optimize electrolytes  Continuous telemetry monitoring  I-S/PEP/mobilize

## 2020-06-17 NOTE — ANESTHESIA PROCEDURE NOTES
Arterial Line  Performed by: Adam Camacho M.D.  Authorized by: Adam Camacho M.D.     Start Time:  6/17/2020 8:07 AM  Localization: surface landmarks    Patient Location:  OR  Indication: continuous blood pressure monitoring        Catheter Size:  20 G  Seldinger Technique?: Yes    Site:  Radial artery  Line Secured:  Antimicrobial disc, tape and transparent dressing  Events: patient tolerated procedure well with no complications

## 2020-06-17 NOTE — ANESTHESIA TIME REPORT
Anesthesia Start and Stop Event Times     Date Time Event    6/17/2020 0750 Ready for Procedure     0800 Anesthesia Start     1130 Anesthesia Stop        Responsible Staff  06/17/20    Name Role Begin End    Adam Camacho M.D. Anesth 0800 1130        Preop Diagnosis (Free Text):  Pre-op Diagnosis     mitral regurgitation, AFIB        Preop Diagnosis (Codes):    Post op Diagnosis  Severe mitral insufficiency      Premium Reason  Non-Premium    Comments: sanchez, cvl, crista

## 2020-06-17 NOTE — ANESTHESIA PROCEDURE NOTES
Central Venous Line  Performed by: dAam Camacho M.D.  Authorized by: Adam Camacho M.D.     Start Time:  6/17/2020 8:17 AM      provider hand hygiene performed prior to central venous catheter insertion, all 5 sterile barriers used (gloves, gown, cap, mask, large sterile drape) during central venous catheter insertion and skin prep agent completely dried prior to procedure    Patient Position:  Trendelenburg  Site:  Internal jugular  Prep:  Chlorhexidine  Catheter Size:  7 Fr  Number of Lumens:  Double lumen  target vein identified, needle advanced into vein and blood aspirated and guidewire advanced into vein    Seldinger Technique?: Yes    Ultrasound-Guided: ultrasound-guided  Image captured, interpreted and electronically stored.  Sterile Gel and Probe Cover Used for Ultrasound?: Yes    Intravenous Verification: verified by ultrasound, venous blood return and chest x-ray pending    all ports aspirated, all ports flushed easily, guidewire was removed intact, biopatch was applied, line was sutured in place and dressing was applied    Events: patient tolerated procedure well with no complications

## 2020-06-17 NOTE — ANESTHESIA PREPROCEDURE EVALUATION
Relevant Problems   CARDIAC   (+) Atrial fibrillation (HCC)   (+) Other hemorrhoids   (+) Severe mitral regurgitation       Physical Exam    Airway   Mallampati: II  TM distance: >3 FB  Neck ROM: full       Cardiovascular - normal exam  Rhythm: regular  Rate: normal  (-) murmur     Dental - normal exam           Pulmonary - normal exam  Breath sounds clear to auscultation     Abdominal    Neurological - normal exam                 Anesthesia Plan    ASA 4       Plan - general             Induction: intravenous    Postoperative Plan: Postoperative administration of opioids is intended.    Pertinent diagnostic labs and testing reviewed    Informed Consent:    Anesthetic plan and risks discussed with patient.    Use of blood products discussed with: patient whom consented to blood products.

## 2020-06-17 NOTE — ANESTHESIA POSTPROCEDURE EVALUATION
Patient: Frances Sanchez    Procedure Summary     Date:  06/17/20 Room / Location:  [facility] OR 02 / SURGERY [facility]    Anesthesia Start:  0800 Anesthesia Stop:  1130    Procedures:       MITRAL VALVE REPAIR (Chest)      ECHOCARDIOGRAM, TRANSESOPHAGEAL (Esophagus) Diagnosis:  (mitral regurgitation, AFIB)    Surgeon:  Jeronimo Bryan M.D. Responsible Provider:  Adam Camacho M.D.    Anesthesia Type:  general ASA Status:  4          Final Anesthesia Type: general  Last vitals  BP   Blood Pressure : 130/64    Temp   36.5 °C (97.7 °F)    Pulse   Pulse: 71   Resp   18    SpO2   98 %      Anesthesia Post Evaluation    Patient location during evaluation: ICU  Patient participation: waiting for patient participation  Level of consciousness: obtunded/minimal responses    Airway patency: patent  Anesthetic complications: no  Cardiovascular status: adequate  Respiratory status: acceptable  Hydration status: euvolemic  Comments: Some hypotension on arrival to ICU.  Has been otherwise very stable.    PONV: none           Nurse Pain Score: 0 (NPRS)

## 2020-06-17 NOTE — DIETARY
Nutrition Services: Consult cardiac rehab diet education    Pt is a 75 y.o. female with Dx: Mitral regurgitation, Atrial fibrillation (HCC)     S/p  Radical mitral valve repair, left atrial appendage excision/ligation, and intraoperative transesophageal echocardiography.  Per RD judgment, the above diagnoses and procedures do not warrant specific cardiac diet education.    RD available as further indicated.

## 2020-06-17 NOTE — ANESTHESIA POSTPROCEDURE EVALUATION
Patient: Frances Sanchez    Procedure Summary     Date:  06/17/20 Room / Location:  [facility] OR 02 / SURGERY [facility]    Anesthesia Start:  0800 Anesthesia Stop:  1130    Procedures:       MITRAL VALVE REPAIR (Chest)      ECHOCARDIOGRAM, TRANSESOPHAGEAL (Esophagus) Diagnosis:  (mitral regurgitation, AFIB)    Surgeon:  Jeronimo Bryan M.D. Responsible Provider:  Adam Camacho M.D.    Anesthesia Type:  general ASA Status:  4          Final Anesthesia Type: general  Last vitals  BP   Blood Pressure : 130/64    Temp   36.5 °C (97.7 °F)    Pulse   Pulse: 71   Resp   18    SpO2   98 %      Anesthesia Post Evaluation    Patient location during evaluation: PACU  Patient participation: complete - patient participated  Level of consciousness: awake and alert    Airway patency: patent  Anesthetic complications: no  Cardiovascular status: hemodynamically stable  Respiratory status: acceptable  Hydration status: euvolemic    PONV: none           Nurse Pain Score: 0 (NPRS)

## 2020-06-17 NOTE — CONSULTS
Critical Care Consultation    Date of consult: 6/17/2020    Referring Physician  Jeronimo Bryan M.D.    Reason for Consultation  Severe MR status post repair    History of Presenting Illness  75 y.o. with atrial fibrillation not on anticoagulation, severe MR female who presented 6/17/2020 for repair of mitral valve.  Patient presents postoperatively recovering from anesthesia on full mechanical ventilatory support, epinephrine infusion, norepinephrine infusion, and Precedex.  She did not require any blood products.  Has chest tube in place.  Surgery was otherwise reported uneventful.      Code Status  Full Code    Review of Systems  Review of Systems   Unable to perform ROS: Acuity of condition       Past Medical History   has a past medical history of Arrhythmia, Heart murmur (2019), Heart valve disease, Hyperlipidemia, Kidney stone, HEATHER (obstructive sleep apnea), and Pneumonia (2015).    Surgical History   has a past surgical history that includes bladder neck contracture exicision and lithotripsy (2018).    Family History  family history includes Alzheimer's Disease in her father; Diabetes in her maternal grandmother; Lung Disease in her mother.    Social History   reports that she has never smoked. She has never used smokeless tobacco. She reports that she does not drink alcohol or use drugs.    Medications  Home Medications     Reviewed by Belem Mendoza R.N. (Registered Nurse) on 06/17/20 at 0615  Med List Status: Complete   Medication Last Dose Status   aspirin EC (ECOTRIN) 81 MG Tablet Delayed Response 6/9/2020 Active   ondansetron (ZOFRAN ODT) 4 MG TABLET DISPERSIBLE 6/2/2020 Active              Current Facility-Administered Medications   Medication Dose Route Frequency Provider Last Rate Last Dose   • Respiratory Therapy Consult   Nebulization Continuous RT Margy LEON Dunbar, P.A.-C.       • NS infusion   Intravenous Continuous Margy C Manjinder, P.A.-C.       • calcium CHLORIDE 1,000 mg in D5W 100 mL IVPB  1,000 mg  Intravenous Once PRN Margy C Manjinder, P.A.-C.       • Magnesium Sulfate in D5W IVPB premix 1 g  1 g Intravenous DAILY Margy C Manjinder, P.A.-C.       • K+ Scale: Goal of 4.5  1 Each Intravenous Q6HRS Margy C Manjinder, P.A.-C.   1 Each at 06/17/20 1200   • acetaminophen (TYLENOL) tablet 1,000 mg  1,000 mg Oral Q6HRS Margy C Manjinder, P.A.-C.       • gabapentin (NEURONTIN) capsule 100 mg  100 mg Oral BID Margy C Manjinder, P.A.-C.   Stopped at 06/17/20 1130    Followed by   • [START ON 6/22/2020] gabapentin (NEURONTIN) capsule 100 mg  100 mg Oral Q EVENING Margy C Manjinder, P.A.-C.       • senna-docusate (PERICOLACE or SENOKOT S) 8.6-50 MG per tablet 2 Tab  2 Tab Oral BID Margy C Manjinder, P.A.-C.        And   • [START ON 6/18/2020] polyethylene glycol/lytes (MIRALAX) PACKET 1 Packet  1 Packet Oral DAILY Margy C Manjinder, P.A.-C.        And   • [START ON 6/19/2020] magnesium hydroxide (MILK OF MAGNESIA) suspension 30 mL  30 mL Oral DAILY Margy C Manjinder, P.A.-C.        And   • bisacodyl (DULCOLAX) suppository 10 mg  10 mg Rectal QDAY PRN Margy C Manjinder, P.A.-C.       • electrolyte-A (PLASMALYTE-A) infusion   Intravenous PRN Margy C Manjinder, P.A.-C.       • vancomycin (VANCOCIN) 1 g in  mL IVPB  1 g Intravenous Once Margy C Manjinder, P.A.-C.       • mupirocin (BACTROBAN) 2 % ointment 1 Application  1 Application Topical BID Margy C Manjinder, P.A.-C.       • [START ON 6/18/2020] MD Alert...Warfarin per Pharmacy   Other PHARMACY TO DOSE Margy C Manjinder, P.A.-C.       • [START ON 6/18/2020] aspirin EC (ECOTRIN) tablet 81 mg  81 mg Oral DAILY Margy C Manjinder, P.A.-C.       • Pharmacy Consult Request ...Pain Management Review 1 Each  1 Each Other PHARMACY TO DOSE Margy LEON Dunbar, P.A.-C.       • clevidipine (CLEVIPREX) IV emulsion  1-21 mg/hr Intravenous Continuous Margy LEON Dunbar, P.A.-C.       • nitroglycerin 50 mg in D5W 250 ml infusion  0-100 mcg/min Intravenous Continuous Margy LEON Dunbar, P.A.-C.   Stopped at 06/17/20 1130   • oxyCODONE immediate-release (ROXICODONE) tablet  5 mg  5 mg Oral Q3HRS PRN Margy C Manjinder, P.A.-C.       • oxyCODONE immediate release (ROXICODONE) tablet 10 mg  10 mg Oral Q3HRS PRN Margy C Manjinder, P.A.-C.       • fentaNYL (SUBLIMAZE) injection 50 mcg  50 mcg Intravenous Q3HRS PRN Margy C Manjinder, P.A.-C.       • tramadol (ULTRAM) 50 MG tablet 50 mg  50 mg Oral Q4HRS PRN Margy C Manjinder, P.A.-C.       • midazolam (VERSED) 2 MG/2ML injection 2 mg  2 mg Intravenous Q HOUR PRN Margy C Manjinder, P.A.-C.       • dexmedetomidine (PRECEDEX) 400 mcg/100mL NS premix infusion  0-1.5 mcg/kg/hr Intravenous Continuous Margy C Manjinder, P.A.-C.       • sodium bicarbonate 8.4 % injection 50 mEq  50 mEq Intravenous Q HOUR PRN Margy C Manjinder, P.A.-C.       • morphine (pf) 4 MG/ML injection 4 mg  4 mg Intravenous Q HOUR PRN Mragy C Manjinder, P.A.-C.       • ondansetron (ZOFRAN) syringe/vial injection 8 mg  8 mg Intravenous Q6HRS PRN Margy C Manjinder, P.A.-C.        Or   • prochlorperazine (COMPAZINE) injection 10 mg  10 mg Intravenous Q6HRS PRN Margy C Manjinder, P.A.-C.        Or   • promethazine (PHENERGAN) suppository 25 mg  25 mg Rectal Q6HRS PRN Margy C Manjinder, P.A.-C.       • mag hydrox-al hydrox-simeth (MAALOX PLUS ES or MYLANTA DS) suspension 30 mL  30 mL Oral Q4HRS PRN Margy C Manjinder, P.A.-C.       • diphenhydrAMINE (BENADRYL) tablet/capsule 25 mg  25 mg Oral HS PRN - MR X 1 Margy C Manjinder, P.A.-C.       • norepinephrine (Levophed) infusion 8 mg/250 mL (premix)  0-30 mcg/min Intravenous Continuous Jeronimo Bryan M.D. 3.8 mL/hr at 06/17/20 1145 2 mcg/min at 06/17/20 1145   • potassium chloride (KCL) ivpb 10 mEq  10 mEq Intravenous Once Wilma Villafana, A.P.N.       • insulin regular human (HUMULIN/NOVOLIN R) 62.5 Units in  mL infusion per protocol  1-6 Units/hr Intravenous Continuous Wilma Villafana, A.P.N.        And   • insulin regular (HUMULIN R) injection 0-14 Units  0-14 Units Intravenous Once Wilma Villafana, A.P.N.        And   • insulin regular (HUMULIN R) injection 0-10 Units  0-10 Units  "Intravenous PRN Wilma Villafana A.P.N.        And   • dextrose 50% (D50W) injection 50 mL  50 mL Intravenous PRN Wilma Villafana A.P.N.       • insulin lispro (HUMALOG) injection 0-20 Units  0-20 Units Subcutaneous PRN Wilma Villafana A.P.N.       • EPINEPHrine (Adrenalin) infusion 4 mg/250 mL (premix)  0-0.2 mcg/kg/min Intravenous Continuous Wilma Villafana A.P.N. 22.5 mL/hr at 06/17/20 1200 0.103 mcg/kg/min at 06/17/20 1200       Allergies  Allergies   Allergen Reactions   • Keflex      Pt unsure of reaction; \"its been years\"       Vital Signs last 24 hours  Temp:  [36.5 °C (97.7 °F)] 36.5 °C (97.7 °F)  Pulse:  [71] 71  Resp:  [18] 18  BP: (130-140)/(55-64) 130/64  SpO2:  [98 %] 98 %    Physical Exam  Physical Exam  Vitals signs and nursing note reviewed.   Constitutional:       Appearance: She is not diaphoretic.   HENT:      Head: Normocephalic and atraumatic.   Eyes:      Conjunctiva/sclera: Conjunctivae normal.   Cardiovascular:      Rate and Rhythm: Normal rate.      Pulses: Normal pulses.   Pulmonary:      Breath sounds: Rales present. No wheezing.   Abdominal:      General: There is no distension.      Palpations: Abdomen is soft.      Tenderness: There is no abdominal tenderness.   Musculoskeletal:         General: No swelling.   Skin:     General: Skin is warm and dry.   Neurological:      Comments: Recovering from paralytic   Psychiatric:      Comments: Unable to assess         Fluids    Intake/Output Summary (Last 24 hours) at 6/17/2020 1208  Last data filed at 6/17/2020 1142  Gross per 24 hour   Intake 1528.87 ml   Output 700 ml   Net 828.87 ml       Laboratory  Recent Results (from the past 48 hour(s))   Comp Metabolic Panel (CMP)    Collection Time: 06/16/20 11:00 AM   Result Value Ref Range    Sodium 132 (L) 135 - 145 mmol/L    Potassium 4.0 3.6 - 5.5 mmol/L    Chloride 101 96 - 112 mmol/L    Co2 22 20 - 33 mmol/L    Anion Gap 9.0 7.0 - 16.0    Glucose 126 (H) 65 - 99 mg/dL    Bun 12 8 - 22 mg/dL    " Creatinine 0.73 0.50 - 1.40 mg/dL    Calcium 9.4 8.5 - 10.5 mg/dL    AST(SGOT) 18 12 - 45 U/L    ALT(SGPT) 16 2 - 50 U/L    Alkaline Phosphatase 67 30 - 99 U/L    Total Bilirubin 0.4 0.1 - 1.5 mg/dL    Albumin 4.2 3.2 - 4.9 g/dL    Total Protein 6.7 6.0 - 8.2 g/dL    Globulin 2.5 1.9 - 3.5 g/dL    A-G Ratio 1.7 g/dL   CBC with Differential    Collection Time: 06/16/20 11:00 AM   Result Value Ref Range    WBC 7.4 4.8 - 10.8 K/uL    RBC 4.86 4.20 - 5.40 M/uL    Hemoglobin 14.3 12.0 - 16.0 g/dL    Hematocrit 43.7 37.0 - 47.0 %    MCV 89.9 81.4 - 97.8 fL    MCH 29.4 27.0 - 33.0 pg    MCHC 32.7 (L) 33.6 - 35.0 g/dL    RDW 43.4 35.9 - 50.0 fL    Platelet Count 194 164 - 446 K/uL    MPV 12.7 9.0 - 12.9 fL    Neutrophils-Polys 73.00 (H) 44.00 - 72.00 %    Lymphocytes 19.80 (L) 22.00 - 41.00 %    Monocytes 5.80 0.00 - 13.40 %    Eosinophils 0.40 0.00 - 6.90 %    Basophils 0.70 0.00 - 1.80 %    Immature Granulocytes 0.30 0.00 - 0.90 %    Nucleated RBC 0.00 /100 WBC    Neutrophils (Absolute) 5.37 2.00 - 7.15 K/uL    Lymphs (Absolute) 1.46 1.00 - 4.80 K/uL    Monos (Absolute) 0.43 0.00 - 0.85 K/uL    Eos (Absolute) 0.03 0.00 - 0.51 K/uL    Baso (Absolute) 0.05 0.00 - 0.12 K/uL    Immature Granulocytes (abs) 0.02 0.00 - 0.11 K/uL    NRBC (Absolute) 0.00 K/uL   Prothrombin time (INR)    Collection Time: 06/16/20 11:00 AM   Result Value Ref Range    PT 12.8 12.0 - 14.6 sec    INR 0.94 0.87 - 1.13   APTT (PTT)    Collection Time: 06/16/20 11:00 AM   Result Value Ref Range    APTT 26.2 24.7 - 36.0 sec   COD (Adult)    Collection Time: 06/16/20 11:00 AM   Result Value Ref Range    ABO Grouping Only O     Rh Grouping Only POS     Antibody Screen-Cod NEG    ESTIMATED GFR    Collection Time: 06/16/20 11:00 AM   Result Value Ref Range    GFR If African American >60 >60 mL/min/1.73 m 2    GFR If Non African American >60 >60 mL/min/1.73 m 2   EKG    Collection Time: 06/16/20 11:09 AM   Result Value Ref Range    Report       [facility]    Test Date:  2020  Pt Name:    MARQUEZ MARTINEZ             Department: [facility]  MRN:        [id number]                      Room:  Gender:     Female                       Technician: CHAGO  :        [id number]                   Requested By:LORA CARMICHAEL  Order #:    [id number]                    Reading MD: Lior Zheng MD    Measurements  Intervals                                Axis  Rate:       75                           P:          51  VT:         144                          QRS:        12  QRSD:       74                           T:          55  QT:         388  QTc:        434    Interpretive Statements  SINUS RHYTH  VENTRICULAR PREMATURE COMPLEX  Compared to ECG 2020 14:41:18  Ventricular premature complex(es) now present  Electronically Signed On 2020 11:29:52 PDT by Lior Zheng MD     Urinalysis    Collection Time: 20 12:00 PM    Specimen: Urine, Clean Catch   Result Value Ref Range    Color Yellow     Character Clear     Specific Gravity 1.017 <1.035    Ph 5.0 5.0 - 8.0    Glucose Negative Negative mg/dL    Ketones Negative Negative mg/dL    Protein Negative Negative mg/dL    Bilirubin Negative Negative    Urobilinogen, Urine 0.2 Negative    Nitrite Negative Negative    Leukocyte Esterase Small (A) Negative    Occult Blood Small (A) Negative    Micro Urine Req Microscopic    URINE MICROSCOPIC (W/UA)    Collection Time: 20 12:00 PM   Result Value Ref Range    WBC 2-5 /hpf    RBC 2-5 (A) /hpf    Bacteria Negative None /hpf    Epithelial Cells Negative /hpf    Hyaline Cast 0-2 /lpf   ACCU-CHEK GLUCOSE    Collection Time: 20  6:22 AM   Result Value Ref Range    Glucose - Accu-Ck 97 65 - 99 mg/dL   ABO Rh Confirm    Collection Time: 20  6:25 AM   Result Value Ref Range    ABO Rh Confirm O POS    POC ACT (resulted by nursing)    Collection Time: 20  8:46 AM   Result Value Ref Range    Istat Activated Clotting Time 125 74 - 137 sec   ISTAT  ARTERIAL BLOOD GAS    Collection Time: 06/17/20  8:47 AM   Result Value Ref Range    Ph 7.331 (L) 7.400 - 7.500    Pco2 43.0 (H) 26.0 - 37.0 mmHg    Po2 335 (H) 64 - 87 mmHg    Tco2 24 20 - 33 mmol/L    S02 100 (H) 93 - 99 %    Hco3 22.7 17.0 - 25.0 mmol/L    BE -3 -4 - 3 mmol/L    Body Temp see below degrees    Specimen Arterial     Action Range Triggered NO     Inst. Qualified Patient YES    ISTAT GLUCOSE    Collection Time: 06/17/20  8:47 AM   Result Value Ref Range    Istat Glucose 94 65 - 99 mg/dL   ISTAT SODIUM    Collection Time: 06/17/20  8:47 AM   Result Value Ref Range    Istat Sodium 139 135 - 145 mmol/L   ISTAT HEMATOCRIT AND HEMOGLOBIN    Collection Time: 06/17/20  8:47 AM   Result Value Ref Range    Istat Hematocrit 33 (L) 37 - 47 %    Istat Hemoglobin 11.2 (L) 12.0 - 16.0 g/dL   POC ACT (resulted by nursing)    Collection Time: 06/17/20  8:59 AM   Result Value Ref Range    Istat Activated Clotting Time 692 (H) 74 - 137 sec   ISTAT VENOUS BLOOD GAS    Collection Time: 06/17/20  9:20 AM   Result Value Ref Range    Ph 7.437 7.310 - 7.450    Pco2 34.7 (L) 41.0 - 51.0 mmHg    Po2 48 (H) 25 - 40 mmHg    Tco2 24 20 - 33 mmol/L    SO2 85 %    Hco3 23.4 (L) 24.0 - 28.0 mmol/L    BE -1 -4 - 3 mmol/L    Body Temp see below degrees    Specimen Venous     Action Range Triggered YES     Inst. Qualified Patient YES    ISTAT GLUCOSE    Collection Time: 06/17/20  9:20 AM   Result Value Ref Range    Istat Glucose 95 65 - 99 mg/dL   ISTAT SODIUM    Collection Time: 06/17/20  9:20 AM   Result Value Ref Range    Istat Sodium 139 135 - 145 mmol/L   ISTAT POTASSIUM    Collection Time: 06/17/20  9:20 AM   Result Value Ref Range    Istat Potassium 5.2 3.6 - 5.5 mmol/L   ISTAT IONIZED CA    Collection Time: 06/17/20  9:20 AM   Result Value Ref Range    Istat Ionized Calcium 0.92 (L) 1.10 - 1.30 mmol/L   ISTAT HEMATOCRIT AND HEMOGLOBIN    Collection Time: 06/17/20  9:20 AM   Result Value Ref Range    Istat Hematocrit 22 (L) 37  - 47 %    Istat Hemoglobin 7.5 (L) 12.0 - 16.0 g/dL   ISTAT ARTERIAL BLOOD GAS    Collection Time: 06/17/20  9:20 AM   Result Value Ref Range    Ph 7.493 7.400 - 7.500    Pco2 32.7 26.0 - 37.0 mmHg    Po2 352 (H) 64 - 87 mmHg    Tco2 26 20 - 33 mmol/L    S02 100 (H) 93 - 99 %    Hco3 25.1 (H) 17.0 - 25.0 mmol/L    BE 2 -4 - 3 mmol/L    Body Temp see below degrees    Specimen Arterial     Action Range Triggered NO     Inst. Qualified Patient YES    ISTAT GLUCOSE    Collection Time: 06/17/20  9:20 AM   Result Value Ref Range    Istat Glucose 95 65 - 99 mg/dL   ISTAT SODIUM    Collection Time: 06/17/20  9:20 AM   Result Value Ref Range    Istat Sodium 138 135 - 145 mmol/L   ISTAT POTASSIUM    Collection Time: 06/17/20  9:20 AM   Result Value Ref Range    Istat Potassium 5.3 3.6 - 5.5 mmol/L   ISTAT IONIZED CA    Collection Time: 06/17/20  9:20 AM   Result Value Ref Range    Istat Ionized Calcium 0.91 (L) 1.10 - 1.30 mmol/L   ISTAT HEMATOCRIT AND HEMOGLOBIN    Collection Time: 06/17/20  9:20 AM   Result Value Ref Range    Istat Hematocrit 22 (L) 37 - 47 %    Istat Hemoglobin 7.5 (L) 12.0 - 16.0 g/dL   POC ACT (resulted by nursing)    Collection Time: 06/17/20  9:47 AM   Result Value Ref Range    Istat Activated Clotting Time 505 (H) 74 - 137 sec   ISTAT ARTERIAL BLOOD GAS    Collection Time: 06/17/20  9:48 AM   Result Value Ref Range    Ph 7.417 7.400 - 7.500    Pco2 39.7 (H) 26.0 - 37.0 mmHg    Po2 301 (H) 64 - 87 mmHg    Tco2 27 20 - 33 mmol/L    S02 100 (H) 93 - 99 %    Hco3 25.6 (H) 17.0 - 25.0 mmol/L    BE 1 -4 - 3 mmol/L    Body Temp 37.0 C degrees    O2 Therapy 70 %    iPF Ratio 430     Ph Temp Pascale 7.417 7.400 - 7.500    Pco2 Temp Co 39.7 (H) 26.0 - 37.0 mmHg    Po2 Temp Cor 301 (H) 64 - 87 mmHg    Specimen Arterial     Action Range Triggered NO     Inst. Qualified Patient YES    ISTAT GLUCOSE    Collection Time: 06/17/20  9:48 AM   Result Value Ref Range    Istat Glucose 132 (H) 65 - 99 mg/dL   ISTAT SODIUM     Collection Time: 06/17/20  9:48 AM   Result Value Ref Range    Istat Sodium 137 135 - 145 mmol/L   ISTAT POTASSIUM    Collection Time: 06/17/20  9:48 AM   Result Value Ref Range    Istat Potassium 5.0 3.6 - 5.5 mmol/L   ISTAT IONIZED CA    Collection Time: 06/17/20  9:48 AM   Result Value Ref Range    Istat Ionized Calcium 0.95 (L) 1.10 - 1.30 mmol/L   ISTAT HEMATOCRIT AND HEMOGLOBIN    Collection Time: 06/17/20  9:48 AM   Result Value Ref Range    Istat Hematocrit 20 (L) 37 - 47 %    Istat Hemoglobin 6.8 (L) 12.0 - 16.0 g/dL   POC ACT (resulted by nursing)    Collection Time: 06/17/20 10:12 AM   Result Value Ref Range    Istat Activated Clotting Time 378 (H) 74 - 137 sec   ISTAT ARTERIAL BLOOD GAS    Collection Time: 06/17/20 10:13 AM   Result Value Ref Range    Ph 7.390 (L) 7.400 - 7.500    Pco2 36.9 26.0 - 37.0 mmHg    Po2 269 (H) 64 - 87 mmHg    Tco2 23 20 - 33 mmol/L    S02 100 (H) 93 - 99 %    Hco3 22.4 17.0 - 25.0 mmol/L    BE -2 -4 - 3 mmol/L    Body Temp see below degrees    Specimen Arterial     Action Range Triggered NO     Inst. Qualified Patient YES    ISTAT GLUCOSE    Collection Time: 06/17/20 10:13 AM   Result Value Ref Range    Istat Glucose 150 (H) 65 - 99 mg/dL   ISTAT SODIUM    Collection Time: 06/17/20 10:13 AM   Result Value Ref Range    Istat Sodium 132 (L) 135 - 145 mmol/L   ISTAT POTASSIUM    Collection Time: 06/17/20 10:13 AM   Result Value Ref Range    Istat Potassium 5.1 3.6 - 5.5 mmol/L   ISTAT IONIZED CA    Collection Time: 06/17/20 10:13 AM   Result Value Ref Range    Istat Ionized Calcium 0.97 (L) 1.10 - 1.30 mmol/L   ISTAT HEMATOCRIT AND HEMOGLOBIN    Collection Time: 06/17/20 10:13 AM   Result Value Ref Range    Istat Hematocrit 20 (L) 37 - 47 %    Istat Hemoglobin 6.8 (L) 12.0 - 16.0 g/dL   POC ACT (resulted by nursing)    Collection Time: 06/17/20 10:35 AM   Result Value Ref Range    Istat Activated Clotting Time 103 74 - 137 sec   ISTAT ARTERIAL BLOOD GAS    Collection Time:  20 10:36 AM   Result Value Ref Range    Ph 7.401 7.400 - 7.500    Pco2 35.3 26.0 - 37.0 mmHg    Po2 249 (H) 64 - 87 mmHg    Tco2 23 20 - 33 mmol/L    S02 100 (H) 93 - 99 %    Hco3 21.9 17.0 - 25.0 mmol/L    BE -3 -4 - 3 mmol/L    Body Temp 37.0 C degrees    O2 Therapy 100 %    iPF Ratio 249     Ph Temp Pascale 7.401 7.400 - 7.500    Pco2 Temp Co 35.3 26.0 - 37.0 mmHg    Po2 Temp Cor 249 (H) 64 - 87 mmHg    Specimen Arterial     Action Range Triggered NO     Inst. Qualified Patient YES    ISTAT GLUCOSE    Collection Time: 20 10:36 AM   Result Value Ref Range    Istat Glucose 170 (H) 65 - 99 mg/dL   ISTAT SODIUM    Collection Time: 20 10:36 AM   Result Value Ref Range    Istat Sodium 138 135 - 145 mmol/L   ISTAT POTASSIUM    Collection Time: 20 10:36 AM   Result Value Ref Range    Istat Potassium 4.5 3.6 - 5.5 mmol/L   ISTAT IONIZED CA    Collection Time: 20 10:36 AM   Result Value Ref Range    Istat Ionized Calcium 0.94 (L) 1.10 - 1.30 mmol/L   ISTAT HEMATOCRIT AND HEMOGLOBIN    Collection Time: 20 10:36 AM   Result Value Ref Range    Istat Hematocrit 23 (L) 37 - 47 %    Istat Hemoglobin 7.8 (L) 12.0 - 16.0 g/dL   EKG on arrival to [facility]SU    Collection Time: 20 11:19 AM   Result Value Ref Range    Report       [facility]    Test Date:  2020  Pt Name:    MARQUEZ MARTINEZ             Department: 161  MRN:        [id number]                      Room:       T618  Gender:     Female                       Technician: TERRI  :        [id number]                   Requested By:LUZ ELENA LOPEZ  Order #:    [id number]                    Reading MD:    Measurements  Intervals                                Axis  Rate:       95                           P:          85  NC:         156                          QRS:        -36  QRSD:       72                           T:          72  QT:         404  QTc:        508    Interpretive Statements  SINUS RHYTHM  LEFT AXIS DEVIATION  LOW VOLTAGE  IN FRONTAL LEADS  BORDERLINE PROLONGED QT INTERVAL  Compared to ECG 06/16/2020 11:09:01  Left-axis deviation now present  Low QRS voltage now present  Ventricular premature complex(es) no longer present     Platelet count    Collection Time: 06/17/20 11:20 AM   Result Value Ref Range    Platelet Count 108 (L) 164 - 446 K/uL   Magnesium    Collection Time: 06/17/20 11:20 AM   Result Value Ref Range    Magnesium 3.0 (H) 1.5 - 2.5 mg/dL   Prothrombin time (INR)    Collection Time: 06/17/20 11:20 AM   Result Value Ref Range    PT 16.6 (H) 12.0 - 14.6 sec    INR 1.30 (H) 0.87 - 1.13   APTT (PTT)    Collection Time: 06/17/20 11:20 AM   Result Value Ref Range    APTT 31.6 24.7 - 36.0 sec   POTASSIUM SERUM (K)    Collection Time: 06/17/20 11:20 AM   Result Value Ref Range    Potassium 4.2 3.6 - 5.5 mmol/L   ISTAT ARTERIAL BLOOD GAS    Collection Time: 06/17/20 11:23 AM   Result Value Ref Range    Ph 7.419 7.400 - 7.500    Pco2 34.9 26.0 - 37.0 mmHg    Po2 435 (H) 64 - 87 mmHg    Tco2 24 20 - 33 mmol/L    S02 100 (H) 93 - 99 %    Hco3 22.6 17.0 - 25.0 mmol/L    BE -2 -4 - 3 mmol/L    Body Temp 35.9 C degrees    O2 Therapy 100 %    iPF Ratio 435     Ph Temp Pascale 7.436 7.400 - 7.500    Pco2 Temp Co 33.2 26.0 - 37.0 mmHg    Po2 Temp Cor 429 (H) 64 - 87 mmHg    Specimen Arterial     Action Range Triggered NO     Inst. Qualified Patient YES    ISTAT SODIUM    Collection Time: 06/17/20 11:23 AM   Result Value Ref Range    Istat Sodium 139 135 - 145 mmol/L   ISTAT HEMATOCRIT AND HEMOGLOBIN    Collection Time: 06/17/20 11:23 AM   Result Value Ref Range    Istat Hematocrit 31 (L) 37 - 47 %    Istat Hemoglobin 10.5 (L) 12.0 - 16.0 g/dL   Histology Request    Collection Time: 06/17/20 11:42 AM   Result Value Ref Range    Pathology Request Sent to [facility]        Imaging  DX-CHEST-PORTABLE (1 VIEW)   Final Result      1.  Mild bilateral atelectasis.      2.  Right internal jugular catheter tip projects over the superior vena cava. No  pneumothorax is identified.      3.  Endotracheal tube tip projects approximately 3.8 cm above the damion.      DX-CHEST-LIMITED (1 VIEW)    (Results Pending)       Assessment/Plan  Severe mitral regurgitation- (present on admission)  Assessment & Plan  Status post repair 6/17  Continue post heart protocols  Continue full mechanical ventilatory support  Chest ventilator based on ABG and pulmonary mechanics with every hour ABGs  Monitor chest tube output for interval development of coagulopathy or obstruction  Titrate pressors to maintain SBP 90-1 20  Monitor endorgan perfusion  No antiplatelet or anticoagulant therapies at this time  Insulin infusion with every 1 hour titration and FS BS  Optimize electrolytes  Continuous telemetry monitoring      Discussed patient condition and risk of morbidity and/or mortality with RN, RT, Pharmacy and [facility].    The patient remains critically ill.  Critical care time = 35 minutes in directly providing and coordinating critical care and extensive data review.  No time overlap and excludes procedures.

## 2020-06-17 NOTE — PROGRESS NOTES
Updated APRN, Clarisa Sr, patient on Levo 8, Epi 0.06, plasmalyte 1.8 L given, and urine and chest tube output. Also discussed progress on vent weaning. Orders received for Albumin 500mL and plasamalyte 1L prn.

## 2020-06-17 NOTE — OR SURGEON
Immediate Post OP Note    PreOp Diagnosis: MR    PostOp Diagnosis: MR    Procedure(s):  RADICAL MITRAL VALVE REPAIR (P2 triangular resection, 34 mm Montoya flexible annuloplasty band)  LEFT ATRIAL APPENDAGE LIGATION/EXCISION  ECHOCARDIOGRAM, TRANSESOPHAGEAL - Wound Class: None    Surgeon(s):  Jeronimo Bryan M.D.    Assistant:  Margy Dunbar PA-C    Anesthesiologist/Type of Anesthesia:  Anesthesiologist: Adam Camacho M.D./General    Surgical Staff:  Assistant: Margy Dunbar P.A.-C.  Circulator: Steven Chu R.N.  Perfusionist: Lorri Fofana; Natacha GRULLON Baker  Relief Circulator: Ghada Ferraro R.N.  Scrub Person: Herber Dudley; Sarah Beth Vu R.N.  Radiology Technologist: Droa Samuels    Specimens removed if any:  ID Type Source Tests Collected by Time Destination   A : left atrial appendage Tissue Heart PATHOLOGY SPECIMEN Jeronimo Bryan M.D. 6/17/2020  9:16 AM    B : piece of mitral valve Tissue Heart Valve PATHOLOGY SPECIMEN Jeronimo Bryan M.D. 6/17/2020  9:28 AM        Estimated Blood Loss: Minimal    Findings: Severe mitral regurgitation, P2 prolapse, ruptured primary cord    Complications: None        6/17/2020 10:54 AM Jeronimo Bryan M.D.

## 2020-06-17 NOTE — PROGRESS NOTES
Patient transported up to room 618 from OR. Patient placed on all monitors and assessed, drips verified. Report received anesthesiologist. Confirmed chest tube to suction. Not actively pacing, pacer set to backup rate.

## 2020-06-17 NOTE — OP REPORT
DATE OF SERVICE:  06/17/2020    REFERRING PHYSICIAN:  Roshni Gimenez MD    PREOPERATIVE DIAGNOSES:  Severe symptomatic mitral regurgitation (4+,   degenerative), posterior mitral valve leaflet prolapse.    POSTOPERATIVE DIAGNOSES:  Severe symptomatic mitral regurgitation (4+,   degenerative), posterior mitral valve leaflet prolapse.    PROCEDURES:  Radical mitral valve repair (P2 triangular resection, 34 mm   Montoya flexible annuloplasty band), left atrial appendage excision/ligation   and intraoperative transesophageal echocardiography.    SURGEON:  Jeronimo Bryan MD    FIRST ASSISTANT:  Margy Dunbar PA-C    ANESTHESIOLOGIST:  Adam Camacho MD    ANESTHESIA:  General endotracheal.    ESTIMATED BLOOD LOSS:  Minimal.    DRAINS:  Mediastinal chest tubes x2 (32-Tajik straight and angled).    MISCELLANEOUS:  Temporary epicardial ventricular pacemaker wires.    COMPLICATIONS:  None.    INDICATIONS:  The patient is a 75-year-old female with severe symptomatic   mitral regurgitation.  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  There was P2 prolapse.  Her left ventricular   ejection fraction was normal at approximately 65%.    DESCRIPTION OF PROCEDURE:  The patient was brought to the operating room and   placed on the operating room table in the supine position.  After successful   induction of general anesthesia and endotracheal intubation, the patient was   prepped and draped in the usual sterile fashion.  Margy Dunbar PA-C, assisted   with retraction and exposure during the operation and closed the sternal   wound.  Intraoperative transesophageal echocardiography showed severe mitral   regurgitation, P2 prolapse with ruptured primary cord and good left   ventricular ejection fraction of approximately 60%.  An incision was made from   the sternal notch to xiphoid.  The sternum was opened longitudinally with a   sternal saw.  Hemostasis was obtained with  electrocautery at the sternal   edges.  The patient was systemically heparinized.  The pericardium was opened   longitudinally and tented anteriorly with Ethibond stay stitches.  The aortic   cannula was inserted first followed by a dual-stage venous cannula.  An   antegrade cardioplegia cannula was placed in the ascending aorta.    Cardiopulmonary bypass was instituted.  The aorta was crossclamped and the   patient was given 800 mL of cold cardioplegia in an antegrade fashion.  There   was prompt cardiac arrest.  Ice slush was placed on the heart for further   myocardial protection.  A phrenic nerve protector pad was used.  From this   point on, cardioplegia was given in an antegrade fashion every 15-20 minutes   while the aorta was crossclamped.  The left atrial appendage was ligated at   its base and excised.  The stump was oversewn in 2 layers using #4-0 Prolene   sutures.  A left atriotomy was performed just below the interatrial groove.    There was severe P2 prolapse.  The mitral valve leaflets were myxomatous.  A   small triangular P2 resection that included the ruptured cord was performed.    The edges were reapproximated using #4-0 Ethibond stitches in a   figure-of-eight fashion.  Primary cord that was preserved was reimplanted in   the repair.  The #2-0 Ethibond stitches were then placed around the mitral   valve annulus from trigone to trigone.  A 34-mm Montoya flexible annuloplasty   band was then placed in the mitral valve annulus and secured in place with the   Ethibond stitches utilizing the Cor-Knot device.  Testing of the repair with   cold saline did not show any mitral regurgitation.  Rewarming of the patient   was initiated.  The left atriotomy was closed in 2 layers using #4-0 Prolene   sutures.  The aortic crossclamp was removed.  Aortic crossclamp time was 55   minutes.  Total cardiopulmonary bypass time was 68 minutes.  The left   ventricle was deaired in the usual fashion.  The carbon  dioxide, which had   been released over the operative field during the operation, was discontinued.    A straight and an angled 32-Georgian chest tubes were placed in mediastinum.    Temporary epicardial ventricular pacemaker wires were inserted.  The patient   was electrically cardioverted into sinus rhythm.  The antegrade cardioplegia   cannula was removed.  When she was adequately warmed, she was slowly taken off   cardiopulmonary bypass, which she tolerated well.  The dual-stage venous   cannula was removed.  Protamine was given to reverse the effects of heparin.    The aortic cannula was removed.  When adequate hemostasis had been obtained,   the sternum was reapproximated using size 5 sternal wires and the remainder of   the incision was closed in 3 layers using Vicryl sutures.  Intraoperative   transesophageal echocardiography showed an excellent mitral valve repair   without any mitral regurgitation.  The mean mitral transvalvular gradient was   4 mmHg.  Her left ventricular ejection fraction remained normal at   approximately 60%.  There were no apparent complications.  The patient   tolerated the procedure well and left the operating room in guarded condition.       ____________________________________     Jeronimo ANTON    DD:  06/17/2020 10:57:06  DT:  06/17/2020 11:20:32    D#:  [id number]  Job#:  [id number]

## 2020-06-18 ENCOUNTER — APPOINTMENT (OUTPATIENT)
Dept: RADIOLOGY | Facility: MEDICAL CENTER | Age: 75
DRG: 219 | End: 2020-06-18
Attending: PHYSICIAN ASSISTANT
Payer: MEDICARE

## 2020-06-18 LAB
ANION GAP SERPL CALC-SCNC: 6 MMOL/L (ref 7–16)
BUN SERPL-MCNC: 11 MG/DL (ref 8–22)
CA-I BLD ISE-SCNC: 1.04 MMOL/L (ref 1.1–1.3)
CALCIUM SERPL-MCNC: 7.6 MG/DL (ref 8.5–10.5)
CHLORIDE SERPL-SCNC: 106 MMOL/L (ref 96–112)
CO2 SERPL-SCNC: 22 MMOL/L (ref 20–33)
CREAT SERPL-MCNC: 0.63 MG/DL (ref 0.5–1.4)
EKG IMPRESSION: NORMAL
EKG IMPRESSION: NORMAL
ERYTHROCYTE [DISTWIDTH] IN BLOOD BY AUTOMATED COUNT: 47.6 FL (ref 35.9–50)
GLUCOSE BLD-MCNC: 102 MG/DL (ref 65–99)
GLUCOSE BLD-MCNC: 102 MG/DL (ref 65–99)
GLUCOSE BLD-MCNC: 104 MG/DL (ref 65–99)
GLUCOSE BLD-MCNC: 106 MG/DL (ref 65–99)
GLUCOSE BLD-MCNC: 109 MG/DL (ref 65–99)
GLUCOSE BLD-MCNC: 118 MG/DL (ref 65–99)
GLUCOSE BLD-MCNC: 121 MG/DL (ref 65–99)
GLUCOSE BLD-MCNC: 121 MG/DL (ref 65–99)
GLUCOSE BLD-MCNC: 124 MG/DL (ref 65–99)
GLUCOSE BLD-MCNC: 124 MG/DL (ref 65–99)
GLUCOSE BLD-MCNC: 141 MG/DL (ref 65–99)
GLUCOSE BLD-MCNC: 144 MG/DL (ref 65–99)
GLUCOSE BLD-MCNC: 67 MG/DL (ref 65–99)
GLUCOSE BLD-MCNC: 72 MG/DL (ref 65–99)
GLUCOSE BLD-MCNC: 73 MG/DL (ref 65–99)
GLUCOSE BLD-MCNC: 75 MG/DL (ref 65–99)
GLUCOSE SERPL-MCNC: 115 MG/DL (ref 65–99)
HCT VFR BLD AUTO: 27.6 % (ref 37–47)
HCT VFR BLD AUTO: 29.9 % (ref 37–47)
HGB BLD-MCNC: 8.9 G/DL (ref 12–16)
HGB BLD-MCNC: 9.5 G/DL (ref 12–16)
INR PPP: 1.24 (ref 0.87–1.13)
MCH RBC QN AUTO: 30.2 PG (ref 27–33)
MCHC RBC AUTO-ENTMCNC: 32.2 G/DL (ref 33.6–35)
MCV RBC AUTO: 93.6 FL (ref 81.4–97.8)
PLATELET # BLD AUTO: 83 K/UL (ref 164–446)
PMV BLD AUTO: 13.2 FL (ref 9–12.9)
POTASSIUM BLD-SCNC: 3.2 MMOL/L (ref 3.6–5.5)
POTASSIUM SERPL-SCNC: 4.2 MMOL/L (ref 3.6–5.5)
POTASSIUM SERPL-SCNC: 4.8 MMOL/L (ref 3.6–5.5)
PROTHROMBIN TIME: 16 SEC (ref 12–14.6)
RBC # BLD AUTO: 2.95 M/UL (ref 4.2–5.4)
SODIUM BLD-SCNC: 143 MMOL/L (ref 135–145)
SODIUM SERPL-SCNC: 134 MMOL/L (ref 135–145)
WBC # BLD AUTO: 14.4 K/UL (ref 4.8–10.8)

## 2020-06-18 PROCEDURE — 700111 HCHG RX REV CODE 636 W/ 250 OVERRIDE (IP): Performed by: THORACIC SURGERY (CARDIOTHORACIC VASCULAR SURGERY)

## 2020-06-18 PROCEDURE — 93010 ELECTROCARDIOGRAM REPORT: CPT | Mod: 77 | Performed by: INTERNAL MEDICINE

## 2020-06-18 PROCEDURE — 93005 ELECTROCARDIOGRAM TRACING: CPT | Performed by: PHYSICIAN ASSISTANT

## 2020-06-18 PROCEDURE — 700111 HCHG RX REV CODE 636 W/ 250 OVERRIDE (IP): Performed by: PHYSICIAN ASSISTANT

## 2020-06-18 PROCEDURE — 71045 X-RAY EXAM CHEST 1 VIEW: CPT

## 2020-06-18 PROCEDURE — 85014 HEMATOCRIT: CPT

## 2020-06-18 PROCEDURE — 700102 HCHG RX REV CODE 250 W/ 637 OVERRIDE(OP): Performed by: CLINICAL NURSE SPECIALIST

## 2020-06-18 PROCEDURE — 93005 ELECTROCARDIOGRAM TRACING: CPT | Performed by: THORACIC SURGERY (CARDIOTHORACIC VASCULAR SURGERY)

## 2020-06-18 PROCEDURE — 85027 COMPLETE CBC AUTOMATED: CPT

## 2020-06-18 PROCEDURE — 82962 GLUCOSE BLOOD TEST: CPT | Mod: 91

## 2020-06-18 PROCEDURE — 85018 HEMOGLOBIN: CPT

## 2020-06-18 PROCEDURE — A9270 NON-COVERED ITEM OR SERVICE: HCPCS | Performed by: THORACIC SURGERY (CARDIOTHORACIC VASCULAR SURGERY)

## 2020-06-18 PROCEDURE — 99024 POSTOP FOLLOW-UP VISIT: CPT | Performed by: THORACIC SURGERY (CARDIOTHORACIC VASCULAR SURGERY)

## 2020-06-18 PROCEDURE — 99291 CRITICAL CARE FIRST HOUR: CPT | Performed by: INTERNAL MEDICINE

## 2020-06-18 PROCEDURE — 770022 HCHG ROOM/CARE - ICU (200)

## 2020-06-18 PROCEDURE — 700102 HCHG RX REV CODE 250 W/ 637 OVERRIDE(OP): Performed by: PHYSICIAN ASSISTANT

## 2020-06-18 PROCEDURE — 80048 BASIC METABOLIC PNL TOTAL CA: CPT

## 2020-06-18 PROCEDURE — 700101 HCHG RX REV CODE 250: Performed by: PHYSICIAN ASSISTANT

## 2020-06-18 PROCEDURE — A9270 NON-COVERED ITEM OR SERVICE: HCPCS | Performed by: PHYSICIAN ASSISTANT

## 2020-06-18 PROCEDURE — 93010 ELECTROCARDIOGRAM REPORT: CPT | Performed by: INTERNAL MEDICINE

## 2020-06-18 PROCEDURE — 700105 HCHG RX REV CODE 258: Performed by: CLINICAL NURSE SPECIALIST

## 2020-06-18 PROCEDURE — 85610 PROTHROMBIN TIME: CPT

## 2020-06-18 PROCEDURE — 700102 HCHG RX REV CODE 250 W/ 637 OVERRIDE(OP): Performed by: THORACIC SURGERY (CARDIOTHORACIC VASCULAR SURGERY)

## 2020-06-18 RX ORDER — SODIUM CHLORIDE 9 MG/ML
INJECTION, SOLUTION INTRAVENOUS
Status: COMPLETED
Start: 2020-06-18 | End: 2020-06-18

## 2020-06-18 RX ORDER — LORAZEPAM 1 MG/1
0.5 TABLET ORAL EVERY 4 HOURS PRN
Status: DISCONTINUED | OUTPATIENT
Start: 2020-06-18 | End: 2020-06-18

## 2020-06-18 RX ORDER — LORAZEPAM 1 MG/1
0.25 TABLET ORAL EVERY 4 HOURS PRN
Status: DISCONTINUED | OUTPATIENT
Start: 2020-06-18 | End: 2020-06-23 | Stop reason: HOSPADM

## 2020-06-18 RX ORDER — FUROSEMIDE 10 MG/ML
20 INJECTION INTRAMUSCULAR; INTRAVENOUS
Status: DISCONTINUED | OUTPATIENT
Start: 2020-06-18 | End: 2020-06-23 | Stop reason: HOSPADM

## 2020-06-18 RX ORDER — POTASSIUM CHLORIDE 7.45 MG/ML
10 INJECTION INTRAVENOUS ONCE
Status: COMPLETED | OUTPATIENT
Start: 2020-06-18 | End: 2020-06-18

## 2020-06-18 RX ORDER — OMEPRAZOLE 20 MG/1
20 CAPSULE, DELAYED RELEASE ORAL DAILY
Status: DISCONTINUED | OUTPATIENT
Start: 2020-06-18 | End: 2020-06-23 | Stop reason: HOSPADM

## 2020-06-18 RX ORDER — WARFARIN SODIUM 7.5 MG/1
7.5 TABLET ORAL
Status: COMPLETED | OUTPATIENT
Start: 2020-06-18 | End: 2020-06-18

## 2020-06-18 RX ORDER — WARFARIN SODIUM 5 MG/1
5 TABLET ORAL DAILY
Status: DISCONTINUED | OUTPATIENT
Start: 2020-06-19 | End: 2020-06-21

## 2020-06-18 RX ADMIN — MUPIROCIN 1 APPLICATION: 20 OINTMENT TOPICAL at 17:21

## 2020-06-18 RX ADMIN — ACETAMINOPHEN 1000 MG: 500 TABLET ORAL at 05:13

## 2020-06-18 RX ADMIN — OXYCODONE 5 MG: 5 TABLET ORAL at 20:40

## 2020-06-18 RX ADMIN — LORAZEPAM 0.5 MG: 1 TABLET ORAL at 10:37

## 2020-06-18 RX ADMIN — TRAMADOL HYDROCHLORIDE 50 MG: 50 TABLET, FILM COATED ORAL at 12:00

## 2020-06-18 RX ADMIN — WARFARIN SODIUM 7.5 MG: 7.5 TABLET ORAL at 17:20

## 2020-06-18 RX ADMIN — INSULIN HUMAN 5 UNITS: 100 INJECTION, SUSPENSION SUBCUTANEOUS at 09:02

## 2020-06-18 RX ADMIN — INSULIN HUMAN 5 UNITS: 100 INJECTION, SUSPENSION SUBCUTANEOUS at 20:47

## 2020-06-18 RX ADMIN — OXYCODONE HYDROCHLORIDE 10 MG: 10 TABLET ORAL at 05:21

## 2020-06-18 RX ADMIN — GABAPENTIN 100 MG: 100 CAPSULE ORAL at 05:13

## 2020-06-18 RX ADMIN — ACETAMINOPHEN 1000 MG: 500 TABLET ORAL at 23:52

## 2020-06-18 RX ADMIN — PROCHLORPERAZINE EDISYLATE 10 MG: 5 INJECTION INTRAMUSCULAR; INTRAVENOUS at 10:19

## 2020-06-18 RX ADMIN — DOCUSATE SODIUM 50 MG AND SENNOSIDES 8.6 MG 2 TABLET: 8.6; 5 TABLET, FILM COATED ORAL at 17:20

## 2020-06-18 RX ADMIN — ASPIRIN 81 MG: 81 TABLET, COATED ORAL at 05:13

## 2020-06-18 RX ADMIN — ONDANSETRON 8 MG: 2 INJECTION INTRAMUSCULAR; INTRAVENOUS at 07:29

## 2020-06-18 RX ADMIN — DOCUSATE SODIUM 50 MG AND SENNOSIDES 8.6 MG 2 TABLET: 8.6; 5 TABLET, FILM COATED ORAL at 05:13

## 2020-06-18 RX ADMIN — OMEPRAZOLE 20 MG: 20 CAPSULE, DELAYED RELEASE ORAL at 10:10

## 2020-06-18 RX ADMIN — POTASSIUM CHLORIDE 10 MEQ: 7.46 INJECTION, SOLUTION INTRAVENOUS at 06:29

## 2020-06-18 RX ADMIN — SODIUM CHLORIDE 1 UNITS/HR: 9 INJECTION, SOLUTION INTRAVENOUS at 02:33

## 2020-06-18 RX ADMIN — FUROSEMIDE 20 MG: 10 INJECTION, SOLUTION INTRAMUSCULAR; INTRAVENOUS at 09:01

## 2020-06-18 RX ADMIN — MAGNESIUM SULFATE 1 G: 1 INJECTION INTRAVENOUS at 06:00

## 2020-06-18 RX ADMIN — INSULIN HUMAN 5 UNITS: 100 INJECTION, SUSPENSION SUBCUTANEOUS at 15:18

## 2020-06-18 RX ADMIN — ACETAMINOPHEN 1000 MG: 500 TABLET ORAL at 11:58

## 2020-06-18 RX ADMIN — ACETAMINOPHEN 1000 MG: 500 TABLET ORAL at 17:21

## 2020-06-18 RX ADMIN — POLYETHYLENE GLYCOL 3350 1 PACKET: 17 POWDER, FOR SOLUTION ORAL at 17:21

## 2020-06-18 ASSESSMENT — FIBROSIS 4 INDEX: FIB4 SCORE: 3.96

## 2020-06-18 ASSESSMENT — ENCOUNTER SYMPTOMS
VOMITING: 0
HALLUCINATIONS: 0
FOCAL WEAKNESS: 0
BLURRED VISION: 0
TREMORS: 0
NAUSEA: 1
BLOOD IN STOOL: 0
POLYDIPSIA: 0
CHILLS: 0
DIZZINESS: 0
WEIGHT LOSS: 0
SPUTUM PRODUCTION: 0
PND: 0
HEADACHES: 0
DEPRESSION: 0
ORTHOPNEA: 0
BRUISES/BLEEDS EASILY: 0
ABDOMINAL PAIN: 0
SPEECH CHANGE: 0
MEMORY LOSS: 0
NERVOUS/ANXIOUS: 1
SEIZURES: 0
FEVER: 0
DOUBLE VISION: 0
PALPITATIONS: 0
NAUSEA: 0
COUGH: 0
HEMOPTYSIS: 0
EYE PAIN: 0
PHOTOPHOBIA: 0
SHORTNESS OF BREATH: 0

## 2020-06-18 ASSESSMENT — CHA2DS2 SCORE
AGE 65 TO 74: YES
PRIOR STROKE OR TIA OR THROMBOEMBOLISM: NO
VASCULAR DISEASE: NO
HYPERTENSION: NO
CHA2DS2 VASC SCORE: 2
SEX: FEMALE
DIABETES: NO
CHF OR LEFT VENTRICULAR DYSFUNCTION: NO
AGE 75 OR GREATER: NO

## 2020-06-18 NOTE — PROGRESS NOTES
Monitor room notified RN that lead II appeared to have ST elevation.  Pt has no acute pain, EKG ordered, reviewed with Margy Dunbar, no changes from previous EKG, no new orders at this time

## 2020-06-18 NOTE — PROGRESS NOTES
Margy ALBERTS updated concerning bp in the 80's post dose of ativan.  Epi restarted, no additional fluids at this time secondary to infusions previously received.  Will cut dose of Ativan to 0.25mg PRN per Margy

## 2020-06-18 NOTE — PROGRESS NOTES
Updated Margy ALBERTS, that patient is currently at 0.1 mcg/kg Epi and 10 mcg/min. Both pressors were up titrated during hypotensive episode upon arrival from OR. Currently 2L plasmalyte infusing. Adequate urine output and chest tube output minimal.

## 2020-06-18 NOTE — PROGRESS NOTES
Margy ALBERTS updated on monitors showing patient going in and out of a bundle branch. updated on increase in chest tube output (180ml since 10am), ok to draw H&H

## 2020-06-18 NOTE — CARE PLAN
Problem: Day of surgery post CABG/Heart valve replacement  Goal: Stabilization in immediate post op period  Outcome: PROGRESSING AS EXPECTED  Intervention: VS q 15 min x 4 hours, then q 1 hour. Include temperature immediately upon arrival. Check CO/CI q 2-4 hours and PRN  Note: Patient on pressors, Q 15 min VS  Intervention: If radial artery used, elevate arm, no BP checks or needle sticks from affected arm, monitor ulnar pulse and capillary refill  Note: No bp or needle sticks on affected arm of radial art line  Intervention: First post op hour labs and diagnostics per MD order  Note: Labs completed  Intervention: Serum K q 6 hours x 24 hours.  ABG and CBC prn.  Note: Serum K q6 hrs and replacement given   Intervention: For FSBS greater than 130, start Post Cardiac Surgery Insulin Drip Protocol  Note: Post cardiac surgery insulin drip protocol.  Intervention: FSBS frequency as per Cardiac Surgery Insulin Drip Protocol  Note: Q1 hr FSBS  Intervention: For patients on Beta Blockers: verify dose given prior to surgery or within 6 hours after arrival to the unit  Note: NA  Intervention: Chest tube to 20 cm suction, record CT drainage with VS  Note: Chest tube to 20cm suction. Q15 and then Q 30 min output recorded.  Intervention: For CT drainage > 300 cc in first post op hour and/or 150 cc in subsequent hours: platelets, coag screen, fibrinogen, H&H per order  Note: NA. CT drainage within normal range.  Intervention: Titrate and wean off vasoactive drips per patient's condition and per MD order while maintaining SBP  mmHg per MD order  Note: Vasopressors titrated. Levo weaned off and Epi titrated down to 0.02  Intervention: Wean from vent per protocol (see protocol), extubation goal with 2-6 hours post op.  Note: Patient met extubation parameters. Extubated 5 hrs 50 mins.  Intervention: Bedrest until extubated and groin lines out  Note: NA

## 2020-06-18 NOTE — CARE PLAN
Problem: Day of surgery post CABG/Heart valve replacement  Goal: Stabilization in immediate post op period  Intervention: VS q 15 min x 4 hours, then q 1 hour. Include temperature immediately upon arrival. Check CO/CI q 2-4 hours and PRN  Note: VS q15min dueto vasopressors. No swan for CO/CI  Intervention: If radial artery used, elevate arm, no BP checks or needle sticks from affected arm, monitor ulnar pulse and capillary refill  Note: Left radial arterial line elevated with extra pillow dueto tingling. Cap refill <3 sec and pulse strong. Pt states it feels much better with elevation  Intervention: Serum K q 6 hours x 24 hours.  ABG and CBC prn.  Note: K scale in place per protocol  Intervention: For FSBS greater than 130, start Post Cardiac Surgery Insulin Drip Protocol  Note: Insulin gtt infusing per protocol  Intervention: FSBS frequency as per Cardiac Surgery Insulin Drip Protocol  Note: BS taken q1hr   Intervention: For patients on Beta Blockers: verify dose given prior to surgery or within 6 hours after arrival to the unit  Note: N/A  Intervention: Chest tube to 20 cm suction, record CT drainage with VS  Note: Chest tubes hooked up to suction and output recorded  Intervention: For CT drainage > 300 cc in first post op hour and/or 150 cc in subsequent hours: platelets, coag screen, fibrinogen, H&H per order  Note: N/A  Intervention: Titrate and wean off vasoactive drips per patient's condition and per MD order while maintaining SBP  mmHg per MD order  Note: Unable to titrate epi off due to intermittent use   Intervention: VAP protocol in place  Note: N/A  Intervention: Wean from vent per protocol (see protocol), extubation goal with 2-6 hours post op.  Note: N/A  Intervention: IS q 1 hour while awake post extubation  Note: Complete, 1500,. Per pt, 2000 is baseline  Intervention: Bedrest until extubated and groin lines out  Note: No groin lines present  Intervention: Out of bed, dangle 4 hours post  extubation  Note: Completed and recorded in ePIC  Intervention: Up in chair 4 hours, day of extubation  Note: Pt up to chair this AM, POd 1  Intervention: Maintain all original surgical dressings for 24 hours  Note: Midline island dressing remains intact  Intervention: Clear liquids post extubation, advance as tolerated  Note: Diet ordered  Intervention: Discontinue [place] min and arterial line 12-18 hours post op if hemodynamically stable and off vasoactive drips  Note: N/A due to intermittent epi use  Intervention: A-Fib and DVT prophylaxis per MD order or contraindications documented (refer to DVT/VTE problem on Care Plan)  Note: Completed  Intervention: Amiodarone protocol per MD order  Note: N/A     Problem: Post Op Day 1 CABG/Heart Valve Replacement  Goal: Optimal care of the post op CABG/heart valve replacement Post Op Day 1  Intervention: EKG and CXR completed  Note: EKG completed, Chest x-ray to come this AM  Intervention: All valve patients: PT/INR daily  Note: INR drawn  Intervention: Antibiotics are discontinued within 24 hours of anesthesia end time unless indication documented for continuation beyond 24 hours  Note: No ABX scheduled  Intervention: Daily Weights  Note: Weight charted 67.5kg  Intervention: Up in chair for all meals  Note: Pt up to chair this AM  Intervention: Ambulate in am if stable. First ambulation is 25 feet. Repeat x 3 as tolerated.  Ambulate again before bed.  Note: Ambulated 1 foot this AM due to pt inability   Intervention: Discontinue hickman catheter unless documented reason for continuation  Note: N/A at this time  Intervention: Remove original surgical dressing after 24 hrs, leave open to air unless otherwise specified by physician  Note: To be completed   Intervention: Graduated elastic stockings  Note: Large MARTA hose placed on pt  Intervention: Transfer to [facility], begin VS q 4 hours  Note: Unable to transition due to intermittent epi gtt  Intervention: After 24th hour  post-anesthesia end time, transition patient to Cardiac Surgery SQ Insulin Protocol  Note: Ordered in EPIC

## 2020-06-18 NOTE — PROGRESS NOTES
Patient met parameters to extubate. Confirmed with Dr. Wilson to give 1 amp bicarb and then extubate. Patient extubated and placed on 5L NC. Bilateral breath sounds heard.

## 2020-06-18 NOTE — CARE PLAN
Problem: Post Op Day 1 CABG/Heart Valve Replacement  Goal: Optimal care of the post op CABG/heart valve replacement Post Op Day 1  Outcome: PROGRESSING AS EXPECTED  Note: Chest xray completed, V- Wires capped and insulated, ambulated to chair, up to chair for all meals, island dressing off 24 hours post op, IS 1500, chest tube to be evaluated for removal at 1300

## 2020-06-18 NOTE — PROGRESS NOTES
Inpatient Anticoagulation Service Note    Date: 6/18/2020    Reason for Anticoagulation: Atrial Fibrillation, Mitral Valve Repair   Target INR: 2.0 to 3.0  [id number] VASc Score: 2  HAS-BLED Score: 2   Hemoglobin Value: (!) 8.9  Hematocrit Value: (!) 27.6(This result has been called to RN [id number] by Porfirio Scott on 06 18 2020 at 0620,  and has been read back.  )  Lab Platelet Value: (!) 83    INR from last 7 days     Date/Time INR Value    06/18/20 0450  (!) 1.24    06/17/20 1120  (!) 1.3    06/16/20 1100  0.94        Dose from last 7 days     Date/Time Dose (mg)    06/18/20 1250  7.5        Average Dose (mg): 0(new start)  Significant Interactions: Antiplatelet Medications  Bridge Therapy: No  Reversal Agent Administered: Not Applicable    Comments: Patient is POD # 1 from mitral valve repair. Current H/H is anemic but no overt bleeding or s/sx of hemorrhage noted by nursing or providers. DDI with aspirin identified, do not anticipate interaction effect on INR. Patient has been charted as taking 50-75% of meals at this time. Plan to bolus warfarin today 7.5mg x 1 dose and start on maintenance 5 mg tomorrow pending INR trends. Repeat INR in AM.    Plan:  Warfarin 7.5 mg x 1 dose, repeat INR in AM  Education Material Provided?: No(Patient asleep during visits to room)  Pharmacist suggested discharge dosing: TBD, warfarin 5 mg daily pending INR trend. Follow up INR within 48-72 hours of discharge.      Myles Uribe, PharmD

## 2020-06-18 NOTE — PROGRESS NOTES
Monitor Summary-    Rhythm: SR/ST  Rate: 's  Ectopy: Occasional PAC's and PVC's  .16/.08/.38    No pacing throughout shift.        12 hour chart check

## 2020-06-18 NOTE — PROGRESS NOTES
Bedside report received from night RN.  Pt assessed A&Ox4, c/o 3/10 sharp pain under left breast, declines intervention at this time, no sob noted.  POC discussed with pt, all questions answered.  Pt states all needs are met.  Bed alarm on, bed in lowest position, call light within reach.  Will continue to monitor

## 2020-06-18 NOTE — PROGRESS NOTES
Cardiovascular Surgery Progress Note    Name: Frances Sanchez  MRN: [id number]  : 1945  Admit Date: 2020  5:45 AM  Procedure:  Procedure(s) and Anesthesia Type:     * MITRAL VALVE REPAIR - General     * ECHOCARDIOGRAM, TRANSESOPHAGEAL - General  1 Day Post-Op    Vitals:  Patient Vitals for the past 8 hrs:   Temp Monitored Temp 2 SpO2 O2 Delivery Device O2 (LPM) Pulse Resp BP Weight   20 0653 -- -- 94 % Silicone Nasal Cannula 0.5 82 20 -- --   20 0600 37.4 °C (99.3 °F) 37.4 °C (99.3 °F) 94 % -- -- 74 (!) 5 (!) 77/43 67.5 kg (148 lb 13 oz)   20 0521 -- 37.6 °C (99.7 °F) 96 % -- -- 79 -- -- --   20 0515 -- 37.6 °C (99.7 °F) 96 % -- -- 77 -- -- --   20 0500 37.6 °C (99.7 °F) 37.6 °C (99.7 °F) 95 % -- -- 82 -- (!) 85/41 --   20 0445 -- 37.6 °C (99.7 °F) 96 % -- -- 81 -- -- --   20 0430 -- 37.6 °C (99.7 °F) 95 % -- -- 82 -- -- --   20 0415 -- 37.6 °C (99.7 °F) 97 % -- -- 83 -- -- --   20 0400 37.6 °C (99.7 °F) 37.6 °C (99.7 °F) 96 % Silicone Nasal Cannula 0.5 84 -- (!) 96/38 --   20 0345 -- 37.6 °C (99.7 °F) 95 % -- -- 82 -- -- --   20 0330 -- 37.5 °C (99.5 °F) 96 % -- -- 86 -- -- --   20 0315 -- 37.5 °C (99.5 °F) 96 % -- -- 88 -- -- --   20 0300 37.5 °C (99.5 °F) 37.5 °C (99.5 °F) 97 % -- -- 85 -- (!) 91/40 --   20 0245 -- 37.5 °C (99.5 °F) 96 % -- -- 87 -- -- --   20 0230 -- 37.4 °C (99.3 °F) 96 % -- -- 87 -- -- --   20 0215 -- 37.4 °C (99.3 °F) 96 % -- -- 85 -- -- --   20 0200 37.3 °C (99.1 °F) 37.3 °C (99.1 °F) 96 % -- -- 88 -- 101/39 --   20 0145 -- 37.3 °C (99.1 °F) 96 % -- -- 84 -- -- --   20 0130 -- 37.2 °C (99 °F) 95 % -- -- 86 -- -- --   20 0115 -- 37.2 °C (99 °F) 92 % -- -- 85 -- -- --     Temp (24hrs), Av.2 °C (98.9 °F), Min:35.4 °C (95.7 °F), Max:37.7 °C (99.9 °F)      Respiratory:  Vent Mode: ASV, PEEP/CPAP: 8, PIP: 18, MAP: 11 Respiration: 20, Pulse Oximetry: 94 %      Chest Tube Drains:          Fluids:    Intake/Output Summary (Last 24 hours) at 6/18/2020 0913  Last data filed at 6/18/2020 0600  Gross per 24 hour   Intake 7722.04 ml   Output 2910 ml   Net 4812.04 ml     Admit weight: Weight: 60 kg (132 lb 4.4 oz)  Current weight: Weight: 67.5 kg (148 lb 13 oz) (06/18/20 0600)    Labs:  Recent Labs     06/16/20  1100 06/17/20  1120 06/18/20  0450   WBC 7.4  --  14.4*   RBC 4.86  --  2.95*   HEMOGLOBIN 14.3  --  8.9*   HEMATOCRIT 43.7  --  27.6*   MCV 89.9  --  93.6   MCH 29.4  --  30.2   MCHC 32.7*  --  32.2*   RDW 43.4  --  47.6   PLATELETCT 194 108* 83*   MPV 12.7  --  13.2*     Recent Labs     06/16/20  1100   NEUTSPOLYS 73.00*   LYMPHOCYTES 19.80*   MONOCYTES 5.80   EOSINOPHILS 0.40   BASOPHILS 0.70     Recent Labs     06/16/20  1100  06/17/20  1715 06/17/20  2325 06/18/20  0450   SODIUM 132*  --   --   --  134*   POTASSIUM 4.0   < > 3.9 4.8 4.2   CHLORIDE 101  --   --   --  106   CO2 22  --   --   --  22   GLUCOSE 126*  --   --   --  115*   BUN 12  --   --   --  11   CREATININE 0.73  --   --   --  0.63   CALCIUM 9.4  --   --   --  7.6*    < > = values in this interval not displayed.     Recent Labs     06/16/20  1100 06/17/20  1120 06/18/20  0450   APTT 26.2 31.6  --    INR 0.94 1.30* 1.24*       Medications:  • insulin NPH  5 Units      And   • insulin lispro  4 Units     • insulin lispro  0-15 Units     • furosemide  20 mg     • omeprazole  20 mg     • magnesium sulfate  1 g Stopped (06/18/20 0700)   • K+ Scale: Goal of 4.5  1 Each     • acetaminophen  1,000 mg     • senna-docusate  2 Tab      And   • polyethylene glycol/lytes  1 Packet      And   • [START ON 6/19/2020] magnesium hydroxide  30 mL     • mupirocin  1 Application     • MD Alert...Warfarin per Pharmacy       • aspirin EC  81 mg     • Pharmacy Consult Request  1 Each     • insulin regular  0-14 Units          Ordered Medications:    ASA - Yes    Plavix - Yes    Post-operative Beta Blockers - No;  contraindicated because of High risk for heart block    Ace Inhibitor - No; contraindicated because of Normal EF    Statin - No; contraindicated because of No CAD    Exam:   Review of Systems   Constitutional: Positive for malaise/fatigue. Negative for chills, fever and weight loss.   HENT: Negative for ear pain, nosebleeds and tinnitus.    Eyes: Negative for double vision, photophobia and pain.   Respiratory: Negative for cough, hemoptysis and shortness of breath.    Cardiovascular: Positive for chest pain (post operative) and leg swelling. Negative for palpitations, orthopnea and PND.   Gastrointestinal: Positive for nausea. Negative for abdominal pain, blood in stool and vomiting.   Genitourinary: Negative for frequency, hematuria and urgency.   Musculoskeletal: Positive for joint pain.   Skin: Negative for rash.   Neurological: Negative for dizziness, tremors, speech change, focal weakness, seizures and headaches.   Endo/Heme/Allergies: Negative for polydipsia. Does not bruise/bleed easily.   Psychiatric/Behavioral: Negative for hallucinations and memory loss. The patient is nervous/anxious.        Physical Exam  Vitals signs and nursing note reviewed.   Constitutional:       Appearance: Normal appearance.   HENT:      Head: Normocephalic and atraumatic.      Nose: Nose normal.      Mouth/Throat:      Mouth: Mucous membranes are moist.      Pharynx: Oropharynx is clear.   Eyes:      Extraocular Movements: Extraocular movements intact.      Conjunctiva/sclera: Conjunctivae normal.      Pupils: Pupils are equal, round, and reactive to light.   Neck:      Musculoskeletal: Normal range of motion and neck supple.   Cardiovascular:      Rate and Rhythm: Normal rate and regular rhythm.      Pulses: Normal pulses.   Pulmonary:      Effort: Pulmonary effort is normal. No respiratory distress.   Chest:      Chest wall: Tenderness present.   Abdominal:      General: Abdomen is flat. Bowel sounds are normal.       Palpations: Abdomen is soft.   Musculoskeletal: Normal range of motion.      Right lower leg: Edema present.      Left lower leg: Edema present.   Skin:     General: Skin is warm and dry.      Coloration: Skin is not jaundiced.      Findings: No rash.   Neurological:      General: No focal deficit present.      Mental Status: She is alert and oriented to person, place, and time. Mental status is at baseline.   Psychiatric:         Mood and Affect: Mood normal.         Quality Measures:   Quality-Core Measures   Reviewed items::  EKG reviewed, Labs reviewed, Medications reviewed and Radiology images reviewed  Garza catheter::  One or Two Days Post Surgery (Day of Surgery being Day 0)  DVT prophylaxis pharmacological::  Enoxaparin (Lovenox)  DVT prophylaxis - mechanical:  Foot Pumps  Ulcer Prophylaxis::  No      Assessment/Plan:  POD #1:  Off all gtts.  Neuro intact.  Pain controlled.  High anxiety.  Moderate chest tube output overnight, improved this morning.  Fluid/weight up. Nauseated. Plan:  Begin gentle diuresis.  Started Ativan PRN.  Given Zofran.  Plan to remove chest tubes at 1300 if <100 out since 0600. Garza out at same time.  Start omeprazole.  Up and out of bed as tolerated.      Active Hospital Problems    Diagnosis   • Severe mitral regurgitation [I34.0]     Priority: High

## 2020-06-18 NOTE — PROGRESS NOTES
Critical Care Progress Note    Date of admission  6/17/2020    Chief Complaint  75 y.o. female admitted 6/17/2020 with severe MR s/p repair    Hospital Course    75 y.o. with atrial fibrillation not on anticoagulation, severe MR female who presented 6/17/2020 for repair of mitral valve.  Patient presents postoperatively recovering from anesthesia on full mechanical ventilatory support, epinephrine infusion, norepinephrine infusion, and Precedex.  She did not require any blood products.  Has chest tube in place.  Surgery was otherwise reported uneventful.      Interval Problem Update  Reviewed last 24 hour events:  Tmax 100  +4.8L over last 24hr, +4.8L since admit  cxr reviewed  Labs reviewed    Epinephrine @ 0.02  Insulin @ 1    94% on 0.5L  Last bm pta    Review of Systems  Review of Systems   Constitutional: Positive for malaise/fatigue. Negative for chills and fever.   HENT: Negative for congestion.    Eyes: Negative for blurred vision.   Respiratory: Negative for sputum production and shortness of breath.    Cardiovascular: Positive for chest pain (Postsurgical). Negative for palpitations.   Gastrointestinal: Negative for nausea and vomiting.   Neurological: Negative for focal weakness.   Psychiatric/Behavioral: Negative for depression.   All other systems reviewed and are negative.       Vital Signs for last 24 hours   Temp:  [35.4 °C (95.7 °F)-37.7 °C (99.9 °F)] 37.4 °C (99.3 °F)  Pulse:  [] 82  Resp:  [5-30] 20  BP: ()/(37-65) 77/43  SpO2:  [92 %-100 %] 94 %    Hemodynamic parameters for last 24 hours  CVP:  [4 MM HG-13 MM HG] 10 MM HG    Respiratory Information for the last 24 hours  Vent Mode: ASV  PEEP/CPAP: 8  MAP: 11  Control VTE (exp VT): 417    Physical Exam   Physical Exam  Vitals signs and nursing note reviewed.   Constitutional:       Appearance: She is ill-appearing. She is not diaphoretic.   HENT:      Head: Normocephalic and atraumatic.   Eyes:      Conjunctiva/sclera: Conjunctivae  normal.      Pupils: Pupils are equal, round, and reactive to light.   Cardiovascular:      Rate and Rhythm: Normal rate.      Pulses: Normal pulses.   Pulmonary:      Breath sounds: Rhonchi present. No wheezing or rales.   Abdominal:      General: There is no distension.      Palpations: Abdomen is soft.      Tenderness: There is no abdominal tenderness.   Musculoskeletal:         General: No swelling.   Skin:     General: Skin is warm and dry.      Coloration: Skin is not jaundiced.   Neurological:      Cranial Nerves: No cranial nerve deficit.   Psychiatric:         Mood and Affect: Mood normal.         Medications  Current Facility-Administered Medications   Medication Dose Route Frequency Provider Last Rate Last Dose   • insulin NPH (HUMULIN/NOVOLIN) injection 5 Units  5 Units Subcutaneous Q8HRS Jeronimo Bryan M.D.        And   • insulin lispro (HUMALOG) injection 4 Units  4 Units Subcutaneous TID AC Jeronimo Bryan M.D.       • insulin lispro (HUMALOG) injection 0-15 Units  0-15 Units Subcutaneous ACHS & 0200 Jeronimo Bryan M.D.       • Respiratory Therapy Consult   Nebulization Continuous RT Margy C Manjinder, P.A.-C.       • NS infusion   Intravenous Continuous Margy C Manjinder, P.A.-C. 10 mL/hr at 06/17/20 1130     • Magnesium Sulfate in D5W IVPB premix 1 g  1 g Intravenous DAILY Margy C Manjinder, P.A.-C.   Stopped at 06/18/20 0700   • K+ Scale: Goal of 4.5  1 Each Intravenous Q6HRS Margy C Manjinder, P.A.-C.   1 Each at 06/18/20 0600   • acetaminophen (TYLENOL) tablet 1,000 mg  1,000 mg Oral Q6HRS Margy C Manjinder, P.A.-C.   1,000 mg at 06/18/20 0513   • gabapentin (NEURONTIN) capsule 100 mg  100 mg Oral BID Margy C Manjinder, P.A.-C.   100 mg at 06/18/20 0513    Followed by   • [START ON 6/22/2020] gabapentin (NEURONTIN) capsule 100 mg  100 mg Oral Q EVENING Margy C Manjinder, P.A.-C.       • senna-docusate (PERICOLACE or SENOKOT S) 8.6-50 MG per tablet 2 Tab  2 Tab Oral BID Margy C Manjinder, P.A.-C.   2 Tab at 06/18/20 0513    And   •  polyethylene glycol/lytes (MIRALAX) PACKET 1 Packet  1 Packet Oral DAILY Margy C Manjinder, P.A.-C.        And   • [START ON 6/19/2020] magnesium hydroxide (MILK OF MAGNESIA) suspension 30 mL  30 mL Oral DAILY Margy C Manjinder, P.A.-C.        And   • bisacodyl (DULCOLAX) suppository 10 mg  10 mg Rectal QDAY PRN Margy C Manjinder, P.A.-C.       • mupirocin (BACTROBAN) 2 % ointment 1 Application  1 Application Topical BID Margy C Manjinder, P.A.-C.   1 Application at 06/17/20 1830   • MD Alert...Warfarin per Pharmacy   Other PHARMACY TO DOSE Margy C Manjinder, P.A.-C.       • aspirin EC (ECOTRIN) tablet 81 mg  81 mg Oral DAILY Margy C Manjinder, P.A.-C.   81 mg at 06/18/20 0513   • Pharmacy Consult Request ...Pain Management Review 1 Each  1 Each Other PHARMACY TO DOSE Margy C Manjinder, P.A.-C.       • clevidipine (CLEVIPREX) IV emulsion  1-21 mg/hr Intravenous Continuous Margy C Manjinder, P.A.-C.   Stopped at 06/17/20 1130   • nitroglycerin 50 mg in D5W 250 ml infusion  0-100 mcg/min Intravenous Continuous Margy C Manjinder, P.A.-C.   Stopped at 06/17/20 1130   • oxyCODONE immediate-release (ROXICODONE) tablet 5 mg  5 mg Oral Q3HRS PRN Margy C Manjinder, P.A.-C.   5 mg at 06/17/20 1811   • oxyCODONE immediate release (ROXICODONE) tablet 10 mg  10 mg Oral Q3HRS PRN Margy C Manjinder, P.A.-C.   10 mg at 06/18/20 0521   • fentaNYL (SUBLIMAZE) injection 50 mcg  50 mcg Intravenous Q3HRS PRN Margy C Manjinder, P.A.-C.       • tramadol (ULTRAM) 50 MG tablet 50 mg  50 mg Oral Q4HRS PRN Margy C Manjinder, P.A.-C.   50 mg at 06/17/20 2022   • dexmedetomidine (PRECEDEX) 400 mcg/100mL NS premix infusion  0-1.5 mcg/kg/hr Intravenous Continuous Margy C Manjinder, P.A.-C.   Stopped at 06/18/20 0114   • ondansetron (ZOFRAN) syringe/vial injection 8 mg  8 mg Intravenous Q6HRS PRN Margy C Manjinder, P.A.-C.   8 mg at 06/18/20 0729    Or   • prochlorperazine (COMPAZINE) injection 10 mg  10 mg Intravenous Q6HRS PRN Margy C Manjinder, P.A.-C.        Or   • promethazine (PHENERGAN) suppository 25 mg  25 mg Rectal  Q6HRS PRN Margy C Manjinder, P.A.-C.       • mag hydrox-al hydrox-simeth (MAALOX PLUS ES or MYLANTA DS) suspension 30 mL  30 mL Oral Q4HRS PRN Margy C Manjinder, P.A.-C.       • diphenhydrAMINE (BENADRYL) tablet/capsule 25 mg  25 mg Oral HS PRN - MR X 1 Margy C Manjinder, P.A.-C.       • norepinephrine (Levophed) infusion 8 mg/250 mL (premix)  0-30 mcg/min Intravenous Continuous Jeronimo Bryan M.D.   Stopped at 06/17/20 1649   • insulin regular human (HUMULIN/NOVOLIN R) 62.5 Units in  mL infusion per protocol  1-6 Units/hr Intravenous Continuous Wilma Villafana, A.P.N. 4 mL/hr at 06/18/20 0721 1 Units/hr at 06/18/20 0721    And   • insulin regular (HUMULIN R) injection 0-14 Units  0-14 Units Intravenous Once Wilma Villafana, A.P.N.        And   • insulin regular (HUMULIN R) injection 0-10 Units  0-10 Units Intravenous PRN Wilma Villafana, A.P.N.   1 Units at 06/18/20 0307    And   • dextrose 50% (D50W) injection 50 mL  50 mL Intravenous PRN Wilma Villafana, A.P.N.       • insulin lispro (HUMALOG) injection 0-20 Units  0-20 Units Subcutaneous PRN Wilma Villafana, A.P.N.       • EPINEPHrine (Adrenalin) infusion 4 mg/250 mL (premix)  0-0.2 mcg/kg/min Intravenous Continuous Wilma Villafana, A.P.N. 2.2 mL/hr at 06/18/20 0723 0.01 mcg/kg/min at 06/18/20 0723       Fluids    Intake/Output Summary (Last 24 hours) at 6/18/2020 0745  Last data filed at 6/18/2020 0600  Gross per 24 hour   Intake 7722.04 ml   Output 2910 ml   Net 4812.04 ml       Laboratory  Recent Labs     06/17/20  1335 06/17/20  1444 06/17/20  1630   ISTATAPH 7.323* 7.351* 7.332*   ISTATAPCO2 32.1 34.7 29.4   ISTATAPO2 205* 110* 138*   ISTATATCO2 18* 20 16*   JUPQEAP3SUS 100* 98 99   ISTATARTHCO3 16.7* 19.2 15.6*   ISTATARTBE -8* -6* -9*   ISTATTEMP 36.1 C 37.0 C 37.5 C   ISTATFIO2 50 30 30   ISTATSPEC Arterial Arterial Arterial   ISTATAPHTC 7.336* 7.351* 7.325*   [id number] 201* 110* 141*         Recent Labs     06/16/20  1100 06/17/20  1120 06/17/20  1713  06/17/20  2325 06/18/20  0450   SODIUM 132*  --   --   --  134*   POTASSIUM 4.0 4.2 3.9 4.8 4.2   CHLORIDE 101  --   --   --  106   CO2 22  --   --   --  22   BUN 12  --   --   --  11   CREATININE 0.73  --   --   --  0.63   MAGNESIUM  --  3.0*  --   --   --    CALCIUM 9.4  --   --   --  7.6*     Recent Labs     06/16/20  1100 06/18/20  0450   ALTSGPT 16  --    ASTSGOT 18  --    ALKPHOSPHAT 67  --    TBILIRUBIN 0.4  --    GLUCOSE 126* 115*     Recent Labs     06/16/20  1100 06/18/20  0450   WBC 7.4 14.4*   NEUTSPOLYS 73.00*  --    LYMPHOCYTES 19.80*  --    MONOCYTES 5.80  --    EOSINOPHILS 0.40  --    BASOPHILS 0.70  --    ASTSGOT 18  --    ALTSGPT 16  --    ALKPHOSPHAT 67  --    TBILIRUBIN 0.4  --      Recent Labs     06/16/20  1100 06/17/20  1120 06/18/20  0450   RBC 4.86  --  2.95*   HEMOGLOBIN 14.3  --  8.9*   HEMATOCRIT 43.7  --  27.6*   PLATELETCT 194 108* 83*   PROTHROMBTM 12.8 16.6* 16.0*   APTT 26.2 31.6  --    INR 0.94 1.30* 1.24*       Imaging  X-Ray:  I have personally reviewed the images and compared with prior images.    Assessment/Plan  Severe mitral regurgitation- (present on admission)  Assessment & Plan  Status post repair 6/17  Continue post heart protocols  Monitor chest tube output for interval development of coagulopathy or obstruction  Continue to titrate pressors to maintain SBP 90-1 20  Monitor endorgan perfusion  No antiplatelet or anticoagulant therapies at this time  Optimize electrolytes  Continuous telemetry monitoring  I-S/PEP/mobilize       VTE:  Contraindicated  Ulcer: PPI  Lines: Central Line  Ongoing indication addressed    I have performed a physical exam and reviewed and updated ROS and Plan today (6/18/2020). In review of yesterday's note (6/17/2020), there are no changes except as documented above.     Discussed patient condition and risk of morbidity and/or mortality with RN, RT, Pharmacy, Patient and [facility]  The patient remains critically ill.  Critical care time = 30 minutes in  directly providing and coordinating critical care and extensive data review.  No time overlap and excludes procedures.

## 2020-06-18 NOTE — PROGRESS NOTES
Cardiac Surgery RN Navigator Daily Rounding Note  Procedure Performed: Procedure(s):  MITRAL VALVE REPAIR  ECHOCARDIOGRAM, TRANSESOPHAGEAL     By  Dr. Bryan  1 Day Post-Op    Pertinent Overnight Events:    Hypotension overnight requiring 3 liters of plasmalyte; 500     Off levo; remains on low dose Epi almost off; Frequent PAC's overnight    Weight up    Pertinent neuro findings/needs: A&O anxious; some sharp pain under left breast when breathing in    Cardiac/hemodynamics:  Temp (24hrs), Av.2 °C (98.9 °F), Min:35.4 °C (95.7 °F), Max:37.7 °C (99.9 °F)    Heart rhythm: NSR to ST--frequent PAC's  Temp:  [35.4 °C (95.7 °F)-37.7 °C (99.9 °F)] 37.4 °C (99.3 °F)  Pulse:  [] 82  Resp:  [5-30] 20  BP: ()/(37-65) 77/43  SpO2:  [92 %-100 %] 94 %  CVP:  [4 MM HG-13 MM HG] 10 MM HG   IV gtts: NS, Last Rate: 10 mL/hr at 20 1130  clevidipine, Last Rate: Stopped (20 113)  nitroglycerin in D5W, Last Rate: Stopped (20 113)  dexmedetomidine (PRECEDEX) infusion, Last Rate: Stopped (20 0114)  norepinephrine (Levophed) infusion, Last Rate: Stopped (20 1649)  insulin infusion for post-cardiac surgery protocol, Last Rate: 1 Units/hr (20 0721)  EPINEPHrine (Adrenalin) infusion, Last Rate: 0.01 mcg/kg/min (20 0723)      12-hour chest tube output: 220 cc    /Weights:  Admit weight: Weight: 60 kg (132 lb 4.4 oz)  Current Weight: Weight: 67.5 kg (148 lb 13 oz) (20 0600)  Weight change: 7.5 kg (16 lb 8.6 oz)    Intake/Output Summary (Last 24 hours) at 2020 0742  Last data filed at 2020 0600  Gross per 24 hour   Intake 7722.04 ml   Output 2910 ml   Net 4812.04 ml       Adequate urine output: 545 cc--adequate    Respiratory:    Pertinent respiratory findings/needs: .5 L NC 95%    GI findings/needs: none    Labs:  Recent Labs     20  1100 20  1120 20  0450   WBC 7.4  --  14.4*   RBC 4.86  --  2.95*   HEMOGLOBIN 14.3  --  8.9*   HEMATOCRIT 43.7  --   27.6*   MCV 89.9  --  93.6   MCH 29.4  --  30.2   MCHC 32.7*  --  32.2*   RDW 43.4  --  47.6   PLATELETCT 194 108* 83*   MPV 12.7  --  13.2*     Recent Labs     06/16/20  1100  06/17/20  1715 06/17/20  2325 06/18/20  0450   SODIUM 132*  --   --   --  134*   POTASSIUM 4.0   < > 3.9 4.8 4.2   CHLORIDE 101  --   --   --  106   CO2 22  --   --   --  22   GLUCOSE 126*  --   --   --  115*   BUN 12  --   --   --  11   CREATININE 0.73  --   --   --  0.63   CALCIUM 9.4  --   --   --  7.6*    < > = values in this interval not displayed.     INR:   Recent Labs     06/16/20  1100 06/17/20  1120 06/18/20  0450   INR 0.94 1.30* 1.24*       Required Cardiac medications review:  ASA:  Yes  Plavix: No; contraindicated because of On Coumadin / NOAC  BB: No; contraindicated because of High risk for heart block  ACE/ARB: No; contraindicated because of Normal EF  Statin: no CAD  Diuretic: not yet-on pressors    LDA's necessity reviewed: Yes    Thoughts and considerations for team rounding:    Maybe Toradol?    Possibly stop gabapentin and add xanax vs ativan?    Place [facility] order for home health    Discharge plan:    CT surgery f/u appt made  Cardiology f/u appt in place  anticoag referral placed today 6/18

## 2020-06-19 LAB
ANION GAP SERPL CALC-SCNC: 11 MMOL/L (ref 7–16)
BUN SERPL-MCNC: 15 MG/DL (ref 8–22)
CALCIUM SERPL-MCNC: 7.9 MG/DL (ref 8.5–10.5)
CHLORIDE SERPL-SCNC: 101 MMOL/L (ref 96–112)
CO2 SERPL-SCNC: 22 MMOL/L (ref 20–33)
CREAT SERPL-MCNC: 0.74 MG/DL (ref 0.5–1.4)
EKG IMPRESSION: NORMAL
ERYTHROCYTE [DISTWIDTH] IN BLOOD BY AUTOMATED COUNT: 47.7 FL (ref 35.9–50)
GLUCOSE BLD-MCNC: 80 MG/DL (ref 65–99)
GLUCOSE SERPL-MCNC: 85 MG/DL (ref 65–99)
HCT VFR BLD AUTO: 29.7 % (ref 37–47)
HGB BLD-MCNC: 9.4 G/DL (ref 12–16)
INR PPP: 1.2 (ref 0.87–1.13)
MAGNESIUM SERPL-MCNC: 2 MG/DL (ref 1.5–2.5)
MCH RBC QN AUTO: 29.9 PG (ref 27–33)
MCHC RBC AUTO-ENTMCNC: 31.6 G/DL (ref 33.6–35)
MCV RBC AUTO: 94.6 FL (ref 81.4–97.8)
PLATELET # BLD AUTO: 81 K/UL (ref 164–446)
PMV BLD AUTO: 13.4 FL (ref 9–12.9)
POTASSIUM SERPL-SCNC: 4.3 MMOL/L (ref 3.6–5.5)
PROTHROMBIN TIME: 15.6 SEC (ref 12–14.6)
RBC # BLD AUTO: 3.14 M/UL (ref 4.2–5.4)
SODIUM SERPL-SCNC: 134 MMOL/L (ref 135–145)
WBC # BLD AUTO: 15.5 K/UL (ref 4.8–10.8)

## 2020-06-19 PROCEDURE — 93010 ELECTROCARDIOGRAM REPORT: CPT | Performed by: INTERNAL MEDICINE

## 2020-06-19 PROCEDURE — 700102 HCHG RX REV CODE 250 W/ 637 OVERRIDE(OP): Performed by: THORACIC SURGERY (CARDIOTHORACIC VASCULAR SURGERY)

## 2020-06-19 PROCEDURE — 97161 PT EVAL LOW COMPLEX 20 MIN: CPT

## 2020-06-19 PROCEDURE — 700111 HCHG RX REV CODE 636 W/ 250 OVERRIDE (IP): Performed by: PHYSICIAN ASSISTANT

## 2020-06-19 PROCEDURE — 80048 BASIC METABOLIC PNL TOTAL CA: CPT

## 2020-06-19 PROCEDURE — 97165 OT EVAL LOW COMPLEX 30 MIN: CPT

## 2020-06-19 PROCEDURE — A9270 NON-COVERED ITEM OR SERVICE: HCPCS | Performed by: CLINICAL NURSE SPECIALIST

## 2020-06-19 PROCEDURE — 99024 POSTOP FOLLOW-UP VISIT: CPT | Performed by: THORACIC SURGERY (CARDIOTHORACIC VASCULAR SURGERY)

## 2020-06-19 PROCEDURE — A9270 NON-COVERED ITEM OR SERVICE: HCPCS | Performed by: PHYSICIAN ASSISTANT

## 2020-06-19 PROCEDURE — 85027 COMPLETE CBC AUTOMATED: CPT

## 2020-06-19 PROCEDURE — A9270 NON-COVERED ITEM OR SERVICE: HCPCS | Performed by: THORACIC SURGERY (CARDIOTHORACIC VASCULAR SURGERY)

## 2020-06-19 PROCEDURE — 700102 HCHG RX REV CODE 250 W/ 637 OVERRIDE(OP): Performed by: CLINICAL NURSE SPECIALIST

## 2020-06-19 PROCEDURE — 700102 HCHG RX REV CODE 250 W/ 637 OVERRIDE(OP): Performed by: PHYSICIAN ASSISTANT

## 2020-06-19 PROCEDURE — 700111 HCHG RX REV CODE 636 W/ 250 OVERRIDE (IP): Performed by: CLINICAL NURSE SPECIALIST

## 2020-06-19 PROCEDURE — 93005 ELECTROCARDIOGRAM TRACING: CPT | Performed by: THORACIC SURGERY (CARDIOTHORACIC VASCULAR SURGERY)

## 2020-06-19 PROCEDURE — 83735 ASSAY OF MAGNESIUM: CPT

## 2020-06-19 PROCEDURE — 85610 PROTHROMBIN TIME: CPT

## 2020-06-19 PROCEDURE — 770022 HCHG ROOM/CARE - ICU (200)

## 2020-06-19 PROCEDURE — 82962 GLUCOSE BLOOD TEST: CPT

## 2020-06-19 RX ORDER — AMIODARONE HYDROCHLORIDE 200 MG/1
400 TABLET ORAL 2 TIMES DAILY
Status: DISCONTINUED | OUTPATIENT
Start: 2020-06-19 | End: 2020-06-23 | Stop reason: HOSPADM

## 2020-06-19 RX ADMIN — FUROSEMIDE 20 MG: 10 INJECTION, SOLUTION INTRAMUSCULAR; INTRAVENOUS at 05:51

## 2020-06-19 RX ADMIN — AMIODARONE HYDROCHLORIDE 150 MG: 1.5 INJECTION, SOLUTION INTRAVENOUS at 07:20

## 2020-06-19 RX ADMIN — POLYETHYLENE GLYCOL 3350 1 PACKET: 17 POWDER, FOR SOLUTION ORAL at 11:48

## 2020-06-19 RX ADMIN — AMIODARONE HYDROCHLORIDE 400 MG: 200 TABLET ORAL at 17:35

## 2020-06-19 RX ADMIN — MUPIROCIN 1 APPLICATION: 20 OINTMENT TOPICAL at 07:21

## 2020-06-19 RX ADMIN — PROCHLORPERAZINE EDISYLATE 10 MG: 5 INJECTION INTRAMUSCULAR; INTRAVENOUS at 17:41

## 2020-06-19 RX ADMIN — ACETAMINOPHEN 1000 MG: 500 TABLET ORAL at 17:35

## 2020-06-19 RX ADMIN — AMIODARONE HYDROCHLORIDE 1 MG/MIN: 1.8 INJECTION, SOLUTION INTRAVENOUS at 09:15

## 2020-06-19 RX ADMIN — TRAMADOL HYDROCHLORIDE 50 MG: 50 TABLET, FILM COATED ORAL at 01:15

## 2020-06-19 RX ADMIN — ONDANSETRON 4 MG: 2 INJECTION INTRAMUSCULAR; INTRAVENOUS at 11:46

## 2020-06-19 RX ADMIN — DOCUSATE SODIUM 50 MG AND SENNOSIDES 8.6 MG 2 TABLET: 8.6; 5 TABLET, FILM COATED ORAL at 06:00

## 2020-06-19 RX ADMIN — ACETAMINOPHEN 1000 MG: 500 TABLET ORAL at 05:50

## 2020-06-19 RX ADMIN — AMIODARONE HYDROCHLORIDE 0.5 MG/MIN: 1.8 INJECTION, SOLUTION INTRAVENOUS at 15:33

## 2020-06-19 RX ADMIN — OMEPRAZOLE 20 MG: 20 CAPSULE, DELAYED RELEASE ORAL at 05:51

## 2020-06-19 RX ADMIN — MUPIROCIN 1 APPLICATION: 20 OINTMENT TOPICAL at 17:35

## 2020-06-19 RX ADMIN — MAGNESIUM SULFATE 1 G: 1 INJECTION INTRAVENOUS at 06:03

## 2020-06-19 RX ADMIN — OXYCODONE 5 MG: 5 TABLET ORAL at 11:47

## 2020-06-19 RX ADMIN — ASPIRIN 81 MG: 81 TABLET, COATED ORAL at 05:50

## 2020-06-19 RX ADMIN — OXYCODONE 5 MG: 5 TABLET ORAL at 20:51

## 2020-06-19 RX ADMIN — ACETAMINOPHEN 1000 MG: 500 TABLET ORAL at 11:47

## 2020-06-19 ASSESSMENT — ENCOUNTER SYMPTOMS
EYE PAIN: 0
HEADACHES: 0
HALLUCINATIONS: 0
VOMITING: 0
FEVER: 0
DOUBLE VISION: 0
WEIGHT LOSS: 0
PHOTOPHOBIA: 0
BRUISES/BLEEDS EASILY: 0
PALPITATIONS: 0
CHILLS: 0
PND: 0
HEMOPTYSIS: 0
POLYDIPSIA: 0
COUGH: 0
FOCAL WEAKNESS: 0
SEIZURES: 0
SHORTNESS OF BREATH: 0
TREMORS: 0
ABDOMINAL PAIN: 0
MEMORY LOSS: 0
DIZZINESS: 0
NERVOUS/ANXIOUS: 1
SPEECH CHANGE: 0
BLOOD IN STOOL: 0
ORTHOPNEA: 0
NAUSEA: 1

## 2020-06-19 ASSESSMENT — COGNITIVE AND FUNCTIONAL STATUS - GENERAL
STANDING UP FROM CHAIR USING ARMS: A LITTLE
MOVING TO AND FROM BED TO CHAIR: A LOT
MOBILITY SCORE: 17
TURNING FROM BACK TO SIDE WHILE IN FLAT BAD: A LITTLE
WALKING IN HOSPITAL ROOM: A LITTLE
HELP NEEDED FOR BATHING: A LOT
DRESSING REGULAR UPPER BODY CLOTHING: A LITTLE
STANDING UP FROM CHAIR USING ARMS: A LITTLE
SUGGESTED CMS G CODE MODIFIER MOBILITY: CK
PERSONAL GROOMING: A LITTLE
DRESSING REGULAR UPPER BODY CLOTHING: A LITTLE
SUGGESTED CMS G CODE MODIFIER DAILY ACTIVITY: CK
MOBILITY SCORE: 17
TURNING FROM BACK TO SIDE WHILE IN FLAT BAD: A LITTLE
CLIMB 3 TO 5 STEPS WITH RAILING: A LOT
CLIMB 3 TO 5 STEPS WITH RAILING: A LOT
DAILY ACTIVITIY SCORE: 17
DAILY ACTIVITIY SCORE: 18
SUGGESTED CMS G CODE MODIFIER MOBILITY: CK
WALKING IN HOSPITAL ROOM: A LITTLE
DRESSING REGULAR LOWER BODY CLOTHING: A LOT
HELP NEEDED FOR BATHING: A LOT
DRESSING REGULAR LOWER BODY CLOTHING: A LOT
TOILETING: A LITTLE
MOVING TO AND FROM BED TO CHAIR: A LOT
SUGGESTED CMS G CODE MODIFIER DAILY ACTIVITY: CK
TOILETING: A LITTLE

## 2020-06-19 ASSESSMENT — PATIENT HEALTH QUESTIONNAIRE - PHQ9
1. LITTLE INTEREST OR PLEASURE IN DOING THINGS: NOT AT ALL
2. FEELING DOWN, DEPRESSED, IRRITABLE, OR HOPELESS: NOT AT ALL
SUM OF ALL RESPONSES TO PHQ9 QUESTIONS 1 AND 2: 0

## 2020-06-19 ASSESSMENT — LIFESTYLE VARIABLES
HAVE YOU EVER FELT YOU SHOULD CUT DOWN ON YOUR DRINKING: NO
AVERAGE NUMBER OF DAYS PER WEEK YOU HAVE A DRINK CONTAINING ALCOHOL: 0
ON A TYPICAL DAY WHEN YOU DRINK ALCOHOL HOW MANY DRINKS DO YOU HAVE: 0
ALCOHOL_USE: NO
HAVE PEOPLE ANNOYED YOU BY CRITICIZING YOUR DRINKING: NO
TOTAL SCORE: 0
HOW MANY TIMES IN THE PAST YEAR HAVE YOU HAD 5 OR MORE DRINKS IN A DAY: 0
TOTAL SCORE: 0
CONSUMPTION TOTAL: NEGATIVE
DOES PATIENT WANT TO STOP DRINKING: NO
EVER FELT BAD OR GUILTY ABOUT YOUR DRINKING: NO
TOTAL SCORE: 0
EVER HAD A DRINK FIRST THING IN THE MORNING TO STEADY YOUR NERVES TO GET RID OF A HANGOVER: NO

## 2020-06-19 ASSESSMENT — GAIT ASSESSMENTS
ASSISTIVE DEVICE: FRONT WHEEL WALKER
DISTANCE (FEET): 20
GAIT LEVEL OF ASSIST: SUPERVISED

## 2020-06-19 ASSESSMENT — ACTIVITIES OF DAILY LIVING (ADL): TOILETING: INDEPENDENT

## 2020-06-19 ASSESSMENT — FIBROSIS 4 INDEX: FIB4 SCORE: 4.06

## 2020-06-19 NOTE — THERAPY
"Occupational Therapy   Initial Evaluation     Patient Name: Frances Sanchez  Age:  75 y.o., Sex:  female  Medical Record #: [id number]  Today's Date: 6/19/2020     Precautions  Precautions: Fall Risk, Cardiac Precautions (See Comments), Sternal Precautions (See Comments)    Assessment  Patient is 75 y.o. female POD 2 Mitral valve repair.  Additional factors influencing patient status / progress: . Patient is most limited this morning by low BP and associated nausea and lightheadedness. She was able to get out of bed and to the bathroom, but she required several rest breaks. She also presented with cognitive deficits during this time and required multiple cues for initiation and sequencing. Her initial BP was 113/43, and it decreased to 95/45. Pt assisted back to bed. Will attempt to do more activity as patient can tolerate.     Plan    Recommend Occupational Therapy 4 times per week until therapy goals are met for the following treatments:  Adaptive Equipment, Cognitive Skill Development, Manual Therapy Techniques, Neuro Re-Education / Balance, Self Care/Activities of Daily Living, Therapeutic Activities and Therapeutic Exercises.    Discharge recommendations:  Recommend post-acute placement for additional occupational therapy services prior to discharge home. Patient may progress to point of being able to DC home.     Subjective    \"I need to lay down.\"     Objective       06/19/20 1144   Prior Living Situation   Prior Services None   Steps In Home   (2 flights of stairs)   Lives with - Patient's Self Care Capacity Spouse   Comments Patient lives in [facility], has a flight of stairs to get to living room/kitchen and a second FOS to get to her bedroom.   Prior Level of ADL Function   Comments Independent prior   Prior Level of IADL Function   Comments Independent prior, retired   Cognition    Cognition / Consciousness X   Level of Consciousness Confused   Ability To Follow Commands 1 Step   Safety Awareness " Impaired   New Learning Impaired   Attention Impaired   Comments Pleasant and cooperative, became confused when her BP dropped.    ADL Assessment   Grooming Standing;Minimal Assist   Lower Body Dressing Moderate Assist   Functional Mobility   Sit to Stand Minimal Assist   Toilet Transfers Minimal Assist   Transfer Method Stand Pivot   Mobility sup><sit, STS, toilet xfer   Short Term Goals   Short Term Goal # 1 Pt will complete LB dressing supv   Short Term Goal # 2 Pt will complete ADL xfers supv   Short Term Goal # 3 Pt will tolerate 15 mins standing ADL supv

## 2020-06-19 NOTE — THERAPY
"Physical Therapy   Initial Evaluation     Patient Name: Frances Sanchez  Age:  75 y.o., Sex:  female  Medical Record #: [id number]  Today's Date: 6/19/2020     Precautions: Fall Risk, Cardiac Precautions (See Comments), Sternal Precautions (See Comments)    Assessment  Patient is 75 y.o. female that is s/p radical mitral valve repair. She presented to PT with pain, impaired balance and coordination, and decreased activity tolerance which are limiting her ability to safely perform mobility at [facility]. /43 prior to ambulation to toilet with FWW and min A, 95/45 post and symptomatic, RN aware. Complete cardiac education deferred given patient's response to activity and limited attention. Will follow.    Plan  Recommend Physical Therapy 3 times per week until therapy goals are met for the following treatments:  Bed Mobility, Community Re-integration, Equipment, Gait Training, Manual Therapy, Neuro Re-Education / Balance, Self Care/Home Evaluation, Stair Training, Therapeutic Activities and Therapeutic Exercises  Discharge recommendations:  Anticipate that the patient will have no further physical therapy needs after discharge from the hospital. Anticipate she will self progress HEP at home.    Subjective  Agreeable. \"I have lots of stairs at home.\"     Objective   06/19/20 1142   Prior Living Situation   Prior Services None   Housing / Facility 3 Story House   Steps Into Home   (flight)   Steps In Home   (2 flights)   Equipment Owned None   Lives with - Patient's Self Care Capacity Spouse   Comments Patient reported spouse able to assist. Has two flights of stairs in home   Prior Level of Functional Mobility   Bed Mobility Independent   Transfer Status Independent   Ambulation Independent   Distance Ambulation (Feet)   (community)   Assistive Devices Used None   Stairs Independent   Cognition    Cognition / Consciousness X   Level of Consciousness Confused   Ability To Follow Commands 1 Step   Safety Awareness " Impaired   New Learning Impaired   Attention Impaired   Comments pleasant, cooperative. decreased cognition with drop in BP   Balance Assessment   Sitting Balance (Static) Fair +   Sitting Balance (Dynamic) Fair   Standing Balance (Static) Fair   Standing Balance (Dynamic) Fair   Weight Shift Sitting Fair   Weight Shift Standing Fair   Comments with FWW, no LOB   Gait Analysis   Gait Level Of Assist Supervised   Assistive Device Front Wheel Walker   Distance (Feet) 20   # of Times Distance was Traveled 2   Deviation   (decreased jose manuel, step length)   Bed Mobility    Supine to Sit Minimal Assist   Sit to Supine Supervised   Scooting Supervised   Rolling Minimum Assist to Lt.   Comments cues for sternal precautions   Functional Mobility   Sit to Stand Minimal Assist   Transfer Method Stand Step   Patient / Family Goals    Patient / Family Goal #1 go home   Short Term Goals    Short Term Goal # 1 Patient will be able to perform bed mobility with supervision within 6tx in order to get in/out of bed   Short Term Goal # 2 Patient will be able to ambulate 500ft with supervision within 6tx in order to access environment at [facility]   Short Term Goal # 3 Patient will be able to ascend/descend 2 flights of stairs with supervision within 6tx in order to access home   Short Term Goal # 4 Patient will verbalize understanding of self monitored/paced home exercise/walking program utilizing talk test/RPE scale/HR monitoring within 6 tx in order to progress home cardiac rehab program   Anticipated Discharge Equipment   DC Equipment Unable To Determine At This Time

## 2020-06-19 NOTE — DOCUMENTATION QUERY
[facility]                                                                       Query Response Note      PATIENT:               MARQUEZ MARTINEZ  ACCT #:                  [id number]  MRN:                     [id number]  :                      1945  ADMIT DATE:       2020 5:45 AM  DISCH DATE:          RESPONDING  PROVIDER #:        [id number]           QUERY TEXT:    Your help is needed in capturing diagnoses for the corresponding medications ordered.     Please clarify a diagnosis for the following medication(s): Levophed infusion; epinephrine infusion    NOTE:  If an appropriate response is not listed below, please respond with a new note.    The patient's Clinical Indicators include:   BP: 77/43 - 108/36; MAP: 52 - 70;  HR: 71 - 88; RR: 5 - 27   Pulmonary Note: Postoperatively recovering from anesthesia on full mechanical ventilatory support, epinephrine infusion, norepinephrine infusion, and Precedex. She  did not require any blood products.   Treatment: IVF; epinephrine infusion; levophed infusion; vent support  Risk Factors: Age; post-operative radical mitral valve repair  Options provided:   -- Hypotension without shock   -- Hypotension with shock, (Please specify shock type)   -- Unable to determine      Query created by: Magda Franco on 2020 7:55 AM    RESPONSE TEXT:    Hypotension without shock          Electronically signed by:  JON CRUMP MD 2020 2:30 PM

## 2020-06-19 NOTE — PROGRESS NOTES
12 hour chart check     Monitor Summary: SR 60s-70s, NC .16, QRS .08, QT .40 with occasional PACs for majority of shift.    Pt went into Afib 110s-150s at approx. 0610.

## 2020-06-19 NOTE — PROGRESS NOTES
Cardiovascular Surgery Progress Note    Name: Frances Sanchez  MRN: [id number]  : 1945  Admit Date: 2020  5:45 AM  Procedure:  Procedure(s) and Anesthesia Type:     * MITRAL VALVE REPAIR - General     * ECHOCARDIOGRAM, TRANSESOPHAGEAL - General  2 Day Post-Op    Vitals:  Patient Vitals for the past 8 hrs:   Temp Monitored Temp 2 SpO2 O2 Delivery Device O2 (LPM) Pulse Resp BP Weight   20 0800 -- -- 98 % -- 1 91 13 110/66 --   20 0715 -- -- 96 % Silicone Nasal Cannula 2 (!) 110 15 -- --   20 0700 37.4 °C (99.4 °F) -- 96 % -- -- 82 16 117/53 --   20 0600 -- 37 °C (98.6 °F) 97 % -- -- 69 (!) 36 118/39 --   20 0500 -- -- -- -- -- -- -- -- 65.5 kg (144 lb 6.4 oz)   20 0400 36.8 °C (98.2 °F) 36.8 °C (98.2 °F) 100 % Silicone Nasal Cannula 2 68 -- -- --   20 0300 -- 35.6 °C (96.1 °F) 100 % -- -- 67 -- -- --   20 0200 -- 37 °C (98.6 °F) 100 % -- -- 67 -- (!) 108/35 --     Temp (24hrs), Av.3 °C (99.1 °F), Min:36.8 °C (98.2 °F), Max:37.4 °C (99.4 °F)      Respiratory:    Respiration: 13, Pulse Oximetry: 98 %     Chest Tube Drains:          Fluids:    Intake/Output Summary (Last 24 hours) at 2020 0956  Last data filed at 2020 0800  Gross per 24 hour   Intake 2148.6 ml   Output 4100 ml   Net -1951.4 ml     Admit weight: Weight: 60 kg (132 lb 4.4 oz)  Current weight: Weight: 65.5 kg (144 lb 6.4 oz) (20 0500)    Labs:  Recent Labs     20  1100 20  1120 20  0450 20  1336 20  0512   WBC 7.4  --  14.4*  --  15.5*   RBC 4.86  --  2.95*  --  3.14*   HEMOGLOBIN 14.3  --  8.9* 9.5* 9.4*   HEMATOCRIT 43.7  --  27.6* 29.9* 29.7*   MCV 89.9  --  93.6  --  94.6   MCH 29.4  --  30.2  --  29.9   MCHC 32.7*  --  32.2*  --  31.6*   RDW 43.4  --  47.6  --  47.7   PLATELETCT 194 108* 83*  --  81*   MPV 12.7  --  13.2*  --  13.4*     Recent Labs     20  1100   NEUTSPOLYS 73.00*   LYMPHOCYTES 19.80*   MONOCYTES 5.80   EOSINOPHILS 0.40    BASOPHILS 0.70     Recent Labs     06/16/20  1100  06/17/20  2325 06/18/20  0450 06/19/20  0512   SODIUM 132*  --   --  134* 134*   POTASSIUM 4.0   < > 4.8 4.2 4.3   CHLORIDE 101  --   --  106 101   CO2 22  --   --  22 22   GLUCOSE 126*  --   --  115* 85   BUN 12  --   --  11 15   CREATININE 0.73  --   --  0.63 0.74   CALCIUM 9.4  --   --  7.6* 7.9*    < > = values in this interval not displayed.     Recent Labs     06/16/20  1100 06/17/20  1120 06/18/20  0450 06/19/20  0512   APTT 26.2 31.6  --   --    INR 0.94 1.30* 1.24* 1.20*       Medications:  • amiodarone  400 mg     • furosemide  20 mg     • omeprazole  20 mg     • warfarin  5 mg     • acetaminophen  1,000 mg     • senna-docusate  2 Tab      And   • polyethylene glycol/lytes  1 Packet      And   • magnesium hydroxide  30 mL     • mupirocin  1 Application     • MD Alert...Warfarin per Pharmacy       • aspirin EC  81 mg     • Pharmacy Consult Request  1 Each          Ordered Medications:    ASA - Yes    Plavix - Yes    Post-operative Beta Blockers - No; contraindicated because of High risk for heart block    Ace Inhibitor - No; contraindicated because of Normal EF    Statin - No; contraindicated because of No CAD    Exam:   Review of Systems   Constitutional: Positive for malaise/fatigue. Negative for chills, fever and weight loss.   HENT: Negative for ear pain, nosebleeds and tinnitus.    Eyes: Negative for double vision, photophobia and pain.   Respiratory: Negative for cough, hemoptysis and shortness of breath.    Cardiovascular: Positive for chest pain (post operative) and leg swelling. Negative for palpitations, orthopnea and PND.   Gastrointestinal: Positive for nausea. Negative for abdominal pain, blood in stool and vomiting.   Genitourinary: Negative for frequency, hematuria and urgency.   Musculoskeletal: Positive for joint pain.   Skin: Negative for rash.   Neurological: Negative for dizziness, tremors, speech change, focal weakness, seizures and  headaches.   Endo/Heme/Allergies: Negative for polydipsia. Does not bruise/bleed easily.   Psychiatric/Behavioral: Negative for hallucinations and memory loss. The patient is nervous/anxious.        Physical Exam  Vitals signs and nursing note reviewed.   Constitutional:       Appearance: Normal appearance.   HENT:      Head: Normocephalic and atraumatic.      Nose: Nose normal.      Mouth/Throat:      Mouth: Mucous membranes are moist.      Pharynx: Oropharynx is clear.   Eyes:      Extraocular Movements: Extraocular movements intact.      Conjunctiva/sclera: Conjunctivae normal.      Pupils: Pupils are equal, round, and reactive to light.   Neck:      Musculoskeletal: Normal range of motion and neck supple.   Cardiovascular:      Rate and Rhythm: Normal rate and regular rhythm.      Pulses: Normal pulses.   Pulmonary:      Effort: Pulmonary effort is normal. No respiratory distress.   Chest:      Chest wall: Tenderness present.   Abdominal:      General: Abdomen is flat. Bowel sounds are normal.      Palpations: Abdomen is soft.   Musculoskeletal: Normal range of motion.      Right lower leg: Edema present.      Left lower leg: Edema present.   Skin:     General: Skin is warm and dry.      Coloration: Skin is not jaundiced.      Findings: No rash.   Neurological:      General: No focal deficit present.      Mental Status: She is alert and oriented to person, place, and time. Mental status is at baseline.   Psychiatric:         Mood and Affect: Mood normal.       Quality Measures:   Quality-Core Measures   Reviewed items::  EKG reviewed, Labs reviewed, Medications reviewed and Radiology images reviewed  Garza catheter::  One or Two Days Post Surgery (Day of Surgery being Day 0)  DVT prophylaxis pharmacological::  Enoxaparin (Lovenox)  DVT prophylaxis - mechanical:  Foot Pumps  Ulcer Prophylaxis::  No    Assessment/Plan:  POD #1:  Off all gtts.  Neuro intact.  Pain controlled.  High anxiety.  Moderate chest tube  output overnight, improved this morning.  Fluid/weight up. Nauseated. Plan:  Begin gentle diuresis.  Started Ativan PRN.  Given Zofran.  Plan to remove chest tubes at 1300 if <100 out since 0600. Hickman out at same time.  Start omeprazole.  Up and out of bed as tolerated.    POD 2  HDS, afib, neuro intact, wounds CDI, abdomen S/NT, fluid balance positive, wt up.  Chest tube serous.  Plan:  Dc chest tubes and hickman, continue to diurese. IS/ambulate. CPM.    Active Hospital Problems    Diagnosis   • Severe mitral regurgitation [I34.0]     Priority: High

## 2020-06-19 NOTE — PROGRESS NOTES
Inpatient Anticoagulation Service Note    Date: 6/19/2020    Reason for Anticoagulation: Mitral Valve Repair   Target INR: 2.0 to 3.0  [id number] VASc Score: 2  HAS-BLED Score: 2   Hemoglobin Value: (!) 9.4  Hematocrit Value: (!) 29.7  Lab Platelet Value: (!) 81    INR from last 7 days     Date/Time INR Value    06/19/20 0512  (!) 1.2    06/18/20 0450  (!) 1.24    06/17/20 1120  (!) 1.3    06/16/20 1100  0.94        Dose from last 7 days     Date/Time Dose (mg)    06/19/20 1047  5    06/18/20 1250  7.5        Average Dose (mg): 0(new start)  Significant Interactions: Aspirin, Amiodarone  Bridge Therapy: No (If less than 5 days and overlap therapy discontinued -- document reason (i.e. Bleed Risk))    (If still on overlap therapy, if No -- document reason (i.e. Bleed Risk))    Reversal Agent Administered: Not Applicable    A/P: POD #2 MV repair.  Chest tubes to be removed today.  Went into AF this AM and now on amiodarone.  No significant change in INR after a single dose of warfarin, as expected.  Will continue with current plan and give 5mg of warfarin today.    Education Material Provided?: No(Patient asleep during visits to room)  Pharmacist suggested discharge dosing: TBD - consider 5mg po daily with a follow up INR 24 - 48 hours after discharge.     Twin Bocanegra, PharmD, BCPS, BCCP, BCCCP

## 2020-06-19 NOTE — CARE PLAN
Problem: Post op day 2 CABG/Heart Valve Replacement  Goal: Optimal care of the post op CABG/heart valve replacement post op day 2  Outcome: PROGRESSING AS EXPECTED  Note: Pt's epi infusion turned off at beginning of shift.  Pt transitioned to tele status per protocol at end of shift.    Pt went into afib at end of shift.  Cardiac amiodarone protocol initiated.    Intervention: FSBS: when 2 consecutive BS < 130 after post op day 2, discontinue FSBS unless patient is insulin dependent diabetic  Note: 0200 FSBS 115, 0600 FSBS 80.  Blood sugars discontinued per protocol.    Intervention: Daily Weights  Note: 65.5 kg, Stand up scale  Intervention: Up in chair for all meals  Note: Up to chair for breakfast  Intervention: Ambulate, increasing the distance each time x 3 and before bed  Note: Ambulated to trauma elevators before bed.  1 assist with front wheel walker.  Attempted ambulating around nurse's station in am.  Made it about snf around when pt became nausea and dizzy.  Pt taken back to room in wheelchair.    Intervention: Stand at sink and wash up with assistance.  Clean incisions twice daily with soap and water.  Note: Incisions cleaned with soap and water.   Intervention: IS q 1 hour while awake and record best IS volume  Note: Best IS 1200  Intervention: Consider pacer wire removal by MD  Note: To be discussed on day shift.   Intervention: Consider removal of hickman and chest tube if not already done  Note: To be discussed on day shift.

## 2020-06-19 NOTE — PROGRESS NOTES
Pt had a run of PVCs and then went into Afib 150s.  Rate decreased to 110s-130s.  BP stable.  Stat EKG ordered and stat Magnesium ordered due to increased PVCs.  Paged JOSE A Jain.  Received call back within 15 minutes.  Received order for amiodarone bolus and to initiate cardiac amiodarone protocol.

## 2020-06-19 NOTE — CARE PLAN
Respiratory Update    Treatment modality: IS  Frequency: QID    Pt tolerating current treatments well with no adverse reactions.

## 2020-06-19 NOTE — PROGRESS NOTES
Cardiac Surgery RN Navigator Daily Rounding Note  Procedure Performed: Procedure(s):  MITRAL VALVE REPAIR  ECHOCARDIOGRAM, TRANSESOPHAGEAL     By  Dr. Bryan  2 Days Post-Op    Pertinent Overnight Events:    Went into a fib this morning-amio bolus and gtt initiated    Weight going down-still above baseline    Pertinent neuro findings/needs: A&O    Cardiac/hemodynamics:  Temp (24hrs), Av.3 °C (99.1 °F), Min:36.8 °C (98.2 °F), Max:37.4 °C (99.4 °F)    Heart rhythm: a fib 100's  Temp:  [36.8 °C (98.2 °F)-37.4 °C (99.4 °F)] 37.4 °C (99.4 °F)  Pulse:  [] 91  Resp:  [0-36] 13  BP: ()/(34-66) 110/66  SpO2:  [89 %-100 %] 98 %      IV gtts: amiodarone infusion, Last Rate: 1 mg/min (20 0915)      12-hour chest tube output: 300 cc    /Weights:  Admit weight: Weight: 60 kg (132 lb 4.4 oz)  Current Weight: Weight: 65.5 kg (144 lb 6.4 oz) (20 0500)  Weight change: -2 kg (-4 lb 6.6 oz)    Intake/Output Summary (Last 24 hours) at 2020 0948  Last data filed at 2020 0800  Gross per 24 hour   Intake 2148.6 ml   Output 4100 ml   Net -1951.4 ml       Adequate urine output: yes    Respiratory:        Pertinent respiratory findings/needs: 1 L NC 98%    GI findings/needs: none    Labs:  Recent Labs     20  1100 20  1120 20  0450 20  1336 20  0512   WBC 7.4  --  14.4*  --  15.5*   RBC 4.86  --  2.95*  --  3.14*   HEMOGLOBIN 14.3  --  8.9* 9.5* 9.4*   HEMATOCRIT 43.7  --  27.6* 29.9* 29.7*   MCV 89.9  --  93.6  --  94.6   MCH 29.4  --  30.2  --  29.9   MCHC 32.7*  --  32.2*  --  31.6*   RDW 43.4  --  47.6  --  47.7   PLATELETCT 194 108* 83*  --  81*   MPV 12.7  --  13.2*  --  13.4*     Recent Labs     20  1100  20  2325 20  0450 20  0512   SODIUM 132*  --   --  134* 134*   POTASSIUM 4.0   < > 4.8 4.2 4.3   CHLORIDE 101  --   --  106 101   CO2 22  --   --  22 22   GLUCOSE 126*  --   --  115* 85   BUN 12  --   --  11 15   CREATININE 0.73  --   --   0.63 0.74   CALCIUM 9.4  --   --  7.6* 7.9*    < > = values in this interval not displayed.     INR:   Recent Labs     06/16/20  1100 06/17/20  1120 06/18/20  0450 06/19/20  0512   INR 0.94 1.30* 1.24* 1.20*       Required Cardiac medications review:  ASA:  Yes  Plavix: No; contraindicated because of On Coumadin / NOAC  BB: No; contraindicated because of High risk for heart block  ACE/ARB: No; contraindicated because of Normal EF  Statin: no CAD  Diuretic: yes    LDA's necessity reviewed: yes-pacing wires in place    Thoughts and considerations for team rounding:    Need for home health order in Epic.  Patient and  unsure if they want HH or to drive to [place] for lab draws.  I told them no rush and to think about it.    Discharge plan:    Spoke with  Aleksandar and Frances this morning; current plan of care r/t amio and a fib, lines and tubes removal, and discharge plan discussed over the phone.    Possibly home with HH for INR draws in 2-3 days

## 2020-06-19 NOTE — CARE PLAN
Problem: Post op day 2 CABG/Heart Valve Replacement  Goal: Optimal care of the post op CABG/heart valve replacement post op day 2  Outcome: PROGRESSING SLOWER THAN EXPECTED  Intervention: FSBS: when 2 consecutive BS < 130 after post op day 2, discontinue FSBS unless patient is insulin dependent diabetic  Note: Insulin discontinued  Intervention: Daily Weights  Note: Completed this am  Intervention: Up in chair for all meals  Note: Up in chair for all meals  Intervention: Ambulate, increasing the distance each time x 3 and before bed  Note: Pt walked 90 ft. Had orthostatic hypotension with PT in the morning. Improved over the day with increased mobility  Intervention: Stand at sink and wash up with assistance.  Clean incisions twice daily with soap and water.  Note: Full bed bath completed. Incision care completed  Intervention: IS q 1 hour while awake and record best IS volume  Note: Best 1250  Intervention: Consider pacer wire removal by MD  Note: To remain in place. Possible removal in the am  Intervention: Consider removal of hickman and chest tube if not already done  Note: Chest tubes and hickman removed per order

## 2020-06-20 LAB
ANION GAP SERPL CALC-SCNC: 8 MMOL/L (ref 7–16)
BUN SERPL-MCNC: 15 MG/DL (ref 8–22)
CALCIUM SERPL-MCNC: 8 MG/DL (ref 8.5–10.5)
CHLORIDE SERPL-SCNC: 97 MMOL/L (ref 96–112)
CO2 SERPL-SCNC: 23 MMOL/L (ref 20–33)
CREAT SERPL-MCNC: 0.65 MG/DL (ref 0.5–1.4)
ERYTHROCYTE [DISTWIDTH] IN BLOOD BY AUTOMATED COUNT: 45.4 FL (ref 35.9–50)
GLUCOSE SERPL-MCNC: 117 MG/DL (ref 65–99)
HCT VFR BLD AUTO: 29.3 % (ref 37–47)
HGB BLD-MCNC: 9.6 G/DL (ref 12–16)
INR PPP: 1.21 (ref 0.87–1.13)
MAGNESIUM SERPL-MCNC: 1.7 MG/DL (ref 1.5–2.5)
MCH RBC QN AUTO: 30.4 PG (ref 27–33)
MCHC RBC AUTO-ENTMCNC: 32.8 G/DL (ref 33.6–35)
MCV RBC AUTO: 92.7 FL (ref 81.4–97.8)
PLATELET # BLD AUTO: 81 K/UL (ref 164–446)
PMV BLD AUTO: 13.7 FL (ref 9–12.9)
POTASSIUM SERPL-SCNC: 4.1 MMOL/L (ref 3.6–5.5)
POTASSIUM SERPL-SCNC: 4.3 MMOL/L (ref 3.6–5.5)
PROTHROMBIN TIME: 15.6 SEC (ref 12–14.6)
RBC # BLD AUTO: 3.16 M/UL (ref 4.2–5.4)
SODIUM SERPL-SCNC: 128 MMOL/L (ref 135–145)
WBC # BLD AUTO: 14.2 K/UL (ref 4.8–10.8)

## 2020-06-20 PROCEDURE — A9270 NON-COVERED ITEM OR SERVICE: HCPCS | Performed by: PHYSICIAN ASSISTANT

## 2020-06-20 PROCEDURE — 700102 HCHG RX REV CODE 250 W/ 637 OVERRIDE(OP): Performed by: CLINICAL NURSE SPECIALIST

## 2020-06-20 PROCEDURE — A9270 NON-COVERED ITEM OR SERVICE: HCPCS | Performed by: THORACIC SURGERY (CARDIOTHORACIC VASCULAR SURGERY)

## 2020-06-20 PROCEDURE — A9270 NON-COVERED ITEM OR SERVICE: HCPCS | Performed by: CLINICAL NURSE SPECIALIST

## 2020-06-20 PROCEDURE — 84132 ASSAY OF SERUM POTASSIUM: CPT

## 2020-06-20 PROCEDURE — 700102 HCHG RX REV CODE 250 W/ 637 OVERRIDE(OP): Performed by: PHYSICIAN ASSISTANT

## 2020-06-20 PROCEDURE — 700101 HCHG RX REV CODE 250: Performed by: PHYSICIAN ASSISTANT

## 2020-06-20 PROCEDURE — 94760 N-INVAS EAR/PLS OXIMETRY 1: CPT

## 2020-06-20 PROCEDURE — 700102 HCHG RX REV CODE 250 W/ 637 OVERRIDE(OP): Performed by: THORACIC SURGERY (CARDIOTHORACIC VASCULAR SURGERY)

## 2020-06-20 PROCEDURE — 700111 HCHG RX REV CODE 636 W/ 250 OVERRIDE (IP): Performed by: CLINICAL NURSE SPECIALIST

## 2020-06-20 PROCEDURE — 93005 ELECTROCARDIOGRAM TRACING: CPT | Performed by: CLINICAL NURSE SPECIALIST

## 2020-06-20 PROCEDURE — 700111 HCHG RX REV CODE 636 W/ 250 OVERRIDE (IP): Performed by: PHYSICIAN ASSISTANT

## 2020-06-20 PROCEDURE — 85610 PROTHROMBIN TIME: CPT

## 2020-06-20 PROCEDURE — 770020 HCHG ROOM/CARE - TELE (206)

## 2020-06-20 PROCEDURE — 85027 COMPLETE CBC AUTOMATED: CPT

## 2020-06-20 PROCEDURE — 80048 BASIC METABOLIC PNL TOTAL CA: CPT

## 2020-06-20 PROCEDURE — 83735 ASSAY OF MAGNESIUM: CPT

## 2020-06-20 PROCEDURE — 99024 POSTOP FOLLOW-UP VISIT: CPT | Performed by: THORACIC SURGERY (CARDIOTHORACIC VASCULAR SURGERY)

## 2020-06-20 PROCEDURE — 93005 ELECTROCARDIOGRAM TRACING: CPT | Performed by: THORACIC SURGERY (CARDIOTHORACIC VASCULAR SURGERY)

## 2020-06-20 RX ORDER — MAGNESIUM SULFATE 1 G/100ML
1 INJECTION INTRAVENOUS ONCE
Status: COMPLETED | OUTPATIENT
Start: 2020-06-20 | End: 2020-06-20

## 2020-06-20 RX ADMIN — OXYCODONE 5 MG: 5 TABLET ORAL at 12:47

## 2020-06-20 RX ADMIN — OXYCODONE 5 MG: 5 TABLET ORAL at 05:45

## 2020-06-20 RX ADMIN — AMIODARONE HYDROCHLORIDE 150 MG: 1.5 INJECTION, SOLUTION INTRAVENOUS at 19:41

## 2020-06-20 RX ADMIN — ASPIRIN 81 MG: 81 TABLET, COATED ORAL at 05:43

## 2020-06-20 RX ADMIN — AMIODARONE HYDROCHLORIDE 400 MG: 200 TABLET ORAL at 05:43

## 2020-06-20 RX ADMIN — MAGNESIUM SULFATE 1 G: 1 INJECTION INTRAVENOUS at 20:14

## 2020-06-20 RX ADMIN — MUPIROCIN 1 APPLICATION: 20 OINTMENT TOPICAL at 18:11

## 2020-06-20 RX ADMIN — DOCUSATE SODIUM 50 MG AND SENNOSIDES 8.6 MG 2 TABLET: 8.6; 5 TABLET, FILM COATED ORAL at 05:43

## 2020-06-20 RX ADMIN — AMIODARONE HYDROCHLORIDE 400 MG: 200 TABLET ORAL at 18:09

## 2020-06-20 RX ADMIN — AMIODARONE HYDROCHLORIDE 0.5 MG/MIN: 1.8 INJECTION, SOLUTION INTRAVENOUS at 02:07

## 2020-06-20 RX ADMIN — FUROSEMIDE 20 MG: 10 INJECTION, SOLUTION INTRAMUSCULAR; INTRAVENOUS at 05:49

## 2020-06-20 RX ADMIN — OXYCODONE 5 MG: 5 TABLET ORAL at 18:11

## 2020-06-20 RX ADMIN — AMIODARONE HYDROCHLORIDE 0.99 MG/MIN: 1.8 INJECTION, SOLUTION INTRAVENOUS at 19:41

## 2020-06-20 RX ADMIN — WARFARIN SODIUM 5 MG: 5 TABLET ORAL at 18:09

## 2020-06-20 RX ADMIN — MUPIROCIN 1 APPLICATION: 20 OINTMENT TOPICAL at 05:51

## 2020-06-20 RX ADMIN — OMEPRAZOLE 20 MG: 20 CAPSULE, DELAYED RELEASE ORAL at 05:43

## 2020-06-20 RX ADMIN — OXYCODONE 5 MG: 5 TABLET ORAL at 00:13

## 2020-06-20 ASSESSMENT — ENCOUNTER SYMPTOMS
BRUISES/BLEEDS EASILY: 0
EYE PAIN: 0
PHOTOPHOBIA: 0
HALLUCINATIONS: 0
ABDOMINAL PAIN: 0
COUGH: 0
MEMORY LOSS: 0
SPEECH CHANGE: 0
NERVOUS/ANXIOUS: 1
FOCAL WEAKNESS: 0
HEMOPTYSIS: 0
DOUBLE VISION: 0
SHORTNESS OF BREATH: 0
NAUSEA: 0
CHILLS: 0
ORTHOPNEA: 0
BLOOD IN STOOL: 0
POLYDIPSIA: 0
PALPITATIONS: 0
TREMORS: 0
SEIZURES: 0
WEIGHT LOSS: 0
FEVER: 0
DIZZINESS: 0
VOMITING: 0
PND: 0
HEADACHES: 0

## 2020-06-20 ASSESSMENT — FIBROSIS 4 INDEX
FIB4 SCORE: 4.06
FIB4 SCORE: 4.06

## 2020-06-20 NOTE — PROGRESS NOTES
Inpatient Anticoagulation Service Note    Date: 6/20/2020    Reason for Anticoagulation: Mitral Valve Repair   Target INR: 2.0 to 3.0  [id number] VASc Score: 2  HAS-BLED Score: 2   Hemoglobin Value: (!) 9.6  Hematocrit Value: (!) 29.3  Lab Platelet Value: (!) 81    INR from last 7 days     Date/Time INR Value    06/20/20 0230  (!) 1.21    06/19/20 0512  (!) 1.2    06/18/20 0450  (!) 1.24    06/17/20 1120  (!) 1.3    06/16/20 1100  0.94        Dose from last 7 days     Date/Time Dose (mg)    06/19/20 1047  5    06/18/20 1250  7.5        Average Dose (mg): 0(new start)  Significant Interactions: Amiodarone, Aspirin, Proton Pump Inhibitor  Bridge Therapy: No     Comments: Pt is POD 3 for MV repair. Pt on amiodarone for AFib which will be a DDI with warfarin if it continues going forward. Will continue warfarin 5 mg daily for now and trend the INR. Pt is 76 yo and may nit need 5 mg daily.    Education Material Provided?: No(Patient asleep during visits to room)  Pharmacist suggested discharge dosing: TBD, warfarin 5 mg daily for now     Ga Brooks, PharmD

## 2020-06-20 NOTE — PROGRESS NOTES
Pt received from T618 to T823-2. Tele monitor in place, monitors notified. VSS. Pt oriented to room. Educated on use of the call light. Pt demonstrated use of the call light. Discussed POC. All questions answered.

## 2020-06-20 NOTE — PROGRESS NOTES
Cardiovascular Surgery Progress Note    Name: Frances Sanchez  MRN: [id number]  : 1945  Admit Date: 2020  5:45 AM  Procedure:  Procedure(s) and Anesthesia Type:     * MITRAL VALVE REPAIR - General     * ECHOCARDIOGRAM, TRANSESOPHAGEAL - General  3 Day Post-Op    Vitals:  Patient Vitals for the past 8 hrs:   Temp SpO2 O2 Delivery Device O2 (LPM) Pulse Resp BP Weight   20 0600 -- 93 % None - Room Air -- 65 17 -- 66.3 kg (146 lb 2.6 oz)   20 0545 -- 90 % -- -- 72 18 -- --   20 0500 -- 94 % -- -- 68 (!) 21 (!) 129/38 --   20 0400 37.1 °C (98.8 °F) 96 % Silicone Nasal Cannula 1 64 16 (!) 129/38 --   20 0300 -- 95 % -- -- 64 (!) 24 -- --   20 0200 -- 92 % Silicone Nasal Cannula 1 62 13 -- --     Temp (24hrs), Av.2 °C (98.9 °F), Min:37.1 °C (98.8 °F), Max:37.3 °C (99.1 °F)      Respiratory:    Respiration: 17, Pulse Oximetry: 93 %     Chest Tube Drains:          Fluids:    Intake/Output Summary (Last 24 hours) at 2020 0938  Last data filed at 2020 0700  Gross per 24 hour   Intake 953.55 ml   Output 2500 ml   Net -1546.45 ml     Admit weight: Weight: 60 kg (132 lb 4.4 oz)  Current weight: Weight: 66.3 kg (146 lb 2.6 oz) (20 0600)    Labs:  Recent Labs     20  0450 20  1336 20  0512 20  0230   WBC 14.4*  --  15.5* 14.2*   RBC 2.95*  --  3.14* 3.16*   HEMOGLOBIN 8.9* 9.5* 9.4* 9.6*   HEMATOCRIT 27.6* 29.9* 29.7* 29.3*   MCV 93.6  --  94.6 92.7   MCH 30.2  --  29.9 30.4   MCHC 32.2*  --  31.6* 32.8*   RDW 47.6  --  47.7 45.4   PLATELETCT 83*  --  81* 81*   MPV 13.2*  --  13.4* 13.7*         Recent Labs     20  0450 20  0512 20  0230   SODIUM 134* 134* 128*   POTASSIUM 4.2 4.3 4.3   CHLORIDE 106 101 97   CO2 22 22 23   GLUCOSE 115* 85 117*   BUN 11 15 15   CREATININE 0.63 0.74 0.65   CALCIUM 7.6* 7.9* 8.0*     Recent Labs     20  1120 20  0450 20  0512 20  0230   APTT 31.6  --   --   --    INR  1.30* 1.24* 1.20* 1.21*       Medications:  • amiodarone  400 mg     • furosemide  20 mg     • omeprazole  20 mg     • warfarin  5 mg     • acetaminophen  1,000 mg     • senna-docusate  2 Tab      And   • polyethylene glycol/lytes  1 Packet      And   • magnesium hydroxide  30 mL     • mupirocin  1 Application     • MD Alert...Warfarin per Pharmacy       • aspirin EC  81 mg     • Pharmacy Consult Request  1 Each          Ordered Medications:    ASA - Yes    Plavix - Yes    Post-operative Beta Blockers - No; contraindicated because of High risk for heart block    Ace Inhibitor - No; contraindicated because of Normal EF    Statin - No; contraindicated because of No CAD    Exam:   Review of Systems   Constitutional: Positive for malaise/fatigue. Negative for chills, fever and weight loss.   HENT: Negative for ear pain, nosebleeds and tinnitus.    Eyes: Negative for double vision, photophobia and pain.   Respiratory: Negative for cough, hemoptysis and shortness of breath.    Cardiovascular: Negative for chest pain (post operative), palpitations, orthopnea, leg swelling and PND.   Gastrointestinal: Negative for abdominal pain, blood in stool, nausea and vomiting.   Genitourinary: Negative for frequency, hematuria and urgency.   Musculoskeletal: Positive for joint pain.   Skin: Negative for rash.   Neurological: Negative for dizziness, tremors, speech change, focal weakness, seizures and headaches.   Endo/Heme/Allergies: Negative for polydipsia. Does not bruise/bleed easily.   Psychiatric/Behavioral: Negative for hallucinations and memory loss. The patient is nervous/anxious.        Physical Exam  Vitals signs and nursing note reviewed.   Constitutional:       Appearance: Normal appearance.   HENT:      Head: Normocephalic and atraumatic.      Nose: Nose normal.      Mouth/Throat:      Mouth: Mucous membranes are moist.      Pharynx: Oropharynx is clear.   Eyes:      Extraocular Movements: Extraocular movements intact.       Conjunctiva/sclera: Conjunctivae normal.      Pupils: Pupils are equal, round, and reactive to light.   Neck:      Musculoskeletal: Normal range of motion and neck supple.   Cardiovascular:      Rate and Rhythm: Normal rate and regular rhythm.      Pulses: Normal pulses.   Pulmonary:      Effort: Pulmonary effort is normal. No respiratory distress.   Chest:      Chest wall: Tenderness present.   Abdominal:      General: Abdomen is flat. Bowel sounds are normal.      Palpations: Abdomen is soft.   Musculoskeletal: Normal range of motion.      Right lower leg: No edema.      Left lower leg: No edema.   Skin:     General: Skin is warm and dry.      Coloration: Skin is not jaundiced.      Findings: No rash.   Neurological:      General: No focal deficit present.      Mental Status: She is alert and oriented to person, place, and time. Mental status is at baseline.   Psychiatric:         Mood and Affect: Mood normal.       Quality Measures:   Quality-Core Measures   Reviewed items::  EKG reviewed, Labs reviewed, Medications reviewed and Radiology images reviewed  Hickman catheter::  One or Two Days Post Surgery (Day of Surgery being Day 0)  DVT prophylaxis pharmacological::  Enoxaparin (Lovenox)  DVT prophylaxis - mechanical:  Foot Pumps  Ulcer Prophylaxis::  No    Assessment/Plan:  POD #1:  Off all gtts.  Neuro intact.  Pain controlled.  High anxiety.  Moderate chest tube output overnight, improved this morning.  Fluid/weight up. Nauseated. Plan:  Begin gentle diuresis.  Started Ativan PRN.  Given Zofran.  Plan to remove chest tubes at 1300 if <100 out since 0600. Hickman out at same time.  Start omeprazole.  Up and out of bed as tolerated.    POD 2  HDS, afib, neuro intact, wounds CDI, abdomen S/NT, fluid balance positive, wt up.  Chest tube serous.  Plan:  Dc chest tubes and hickman, continue to diurese. IS/ambulate. CPM.  POD 3  HDS, SR, neuro intact, wounds CDI, abdomen S/NT, fluid balance trending down, wt at baseline,  INR subtherapeutic.  Plan:Transfer to tele.  DC pacing wires.  Continue to diurese.      Active Hospital Problems    Diagnosis   • Severe mitral regurgitation [I34.0]     Priority: High

## 2020-06-20 NOTE — CARE PLAN
Problem: Post op day 2 CABG/Heart Valve Replacement  Goal: Optimal care of the post op CABG/heart valve replacement post op day 2  Intervention: FSBS: when 2 consecutive BS < 130 after post op day 2, discontinue FSBS unless patient is insulin dependent diabetic  Note: Discontinued   Intervention: Daily Weights  Note: Pt weighed via stand-up scale- 66.3kg   Intervention: Up in chair for all meals  Note: Pt refused to get in chair, prefers bed. Education provided   Intervention: Ambulate, increasing the distance each time x 3 and before bed  Note: Pt ambulated to bathroom, to outside door in [place], and back to bed. Education given and encouragement to mobilize farther, pt refusing at this time   Intervention: Stand at sink and wash up with assistance.  Clean incisions twice daily with soap and water.  Note: Completed   Intervention: IS q 1 hour while awake and record best IS volume  Note: IS effective, best value 1000   Intervention: Consider pacer wire removal by MD  Note: Pacer wires insulated per protocol   Intervention: Consider removal of hickman and chest tube if not already done  Note: Both previously removed

## 2020-06-20 NOTE — PROGRESS NOTES
Pt transferred to T803-2 from T823-2.  Report received from DAYAMI Singleton.  Pt oriented to room, unit, and plan of care. Tele-monitor placed and monitor room notified. All questions answered at this time. Call light within reach; fall precautions in place.

## 2020-06-21 LAB
ANION GAP SERPL CALC-SCNC: 7 MMOL/L (ref 7–16)
BUN SERPL-MCNC: 13 MG/DL (ref 8–22)
CALCIUM SERPL-MCNC: 8 MG/DL (ref 8.5–10.5)
CHLORIDE SERPL-SCNC: 99 MMOL/L (ref 96–112)
CO2 SERPL-SCNC: 26 MMOL/L (ref 20–33)
CREAT SERPL-MCNC: 0.63 MG/DL (ref 0.5–1.4)
EKG IMPRESSION: NORMAL
EKG IMPRESSION: NORMAL
GLUCOSE SERPL-MCNC: 128 MG/DL (ref 65–99)
INR PPP: 1.07 (ref 0.87–1.13)
MAGNESIUM SERPL-MCNC: 2 MG/DL (ref 1.5–2.5)
POTASSIUM SERPL-SCNC: 4.1 MMOL/L (ref 3.6–5.5)
PROTHROMBIN TIME: 14.2 SEC (ref 12–14.6)
SODIUM SERPL-SCNC: 132 MMOL/L (ref 135–145)

## 2020-06-21 PROCEDURE — A9270 NON-COVERED ITEM OR SERVICE: HCPCS | Performed by: PHYSICIAN ASSISTANT

## 2020-06-21 PROCEDURE — 80048 BASIC METABOLIC PNL TOTAL CA: CPT

## 2020-06-21 PROCEDURE — 93010 ELECTROCARDIOGRAM REPORT: CPT | Mod: 76 | Performed by: INTERNAL MEDICINE

## 2020-06-21 PROCEDURE — 93005 ELECTROCARDIOGRAM TRACING: CPT | Performed by: THORACIC SURGERY (CARDIOTHORACIC VASCULAR SURGERY)

## 2020-06-21 PROCEDURE — 99024 POSTOP FOLLOW-UP VISIT: CPT | Performed by: THORACIC SURGERY (CARDIOTHORACIC VASCULAR SURGERY)

## 2020-06-21 PROCEDURE — 700102 HCHG RX REV CODE 250 W/ 637 OVERRIDE(OP): Performed by: PHYSICIAN ASSISTANT

## 2020-06-21 PROCEDURE — 700102 HCHG RX REV CODE 250 W/ 637 OVERRIDE(OP): Performed by: THORACIC SURGERY (CARDIOTHORACIC VASCULAR SURGERY)

## 2020-06-21 PROCEDURE — 700111 HCHG RX REV CODE 636 W/ 250 OVERRIDE (IP): Performed by: PHYSICIAN ASSISTANT

## 2020-06-21 PROCEDURE — 700101 HCHG RX REV CODE 250: Performed by: CLINICAL NURSE SPECIALIST

## 2020-06-21 PROCEDURE — A9270 NON-COVERED ITEM OR SERVICE: HCPCS | Performed by: THORACIC SURGERY (CARDIOTHORACIC VASCULAR SURGERY)

## 2020-06-21 PROCEDURE — 83735 ASSAY OF MAGNESIUM: CPT

## 2020-06-21 PROCEDURE — 700102 HCHG RX REV CODE 250 W/ 637 OVERRIDE(OP): Performed by: CLINICAL NURSE SPECIALIST

## 2020-06-21 PROCEDURE — 93010 ELECTROCARDIOGRAM REPORT: CPT | Performed by: INTERNAL MEDICINE

## 2020-06-21 PROCEDURE — 85610 PROTHROMBIN TIME: CPT

## 2020-06-21 PROCEDURE — A9270 NON-COVERED ITEM OR SERVICE: HCPCS | Performed by: CLINICAL NURSE SPECIALIST

## 2020-06-21 PROCEDURE — 770020 HCHG ROOM/CARE - TELE (206)

## 2020-06-21 RX ORDER — FLUOXETINE HYDROCHLORIDE 20 MG/1
20 CAPSULE ORAL DAILY
Status: DISCONTINUED | OUTPATIENT
Start: 2020-06-21 | End: 2020-06-21

## 2020-06-21 RX ORDER — WARFARIN SODIUM 10 MG/1
10 TABLET ORAL ONCE
Status: COMPLETED | OUTPATIENT
Start: 2020-06-21 | End: 2020-06-21

## 2020-06-21 RX ORDER — WARFARIN SODIUM 7.5 MG/1
7.5 TABLET ORAL DAILY
Status: DISCONTINUED | OUTPATIENT
Start: 2020-06-22 | End: 2020-06-23 | Stop reason: HOSPADM

## 2020-06-21 RX ORDER — METOPROLOL TARTRATE 1 MG/ML
2.5-5 INJECTION, SOLUTION INTRAVENOUS ONCE
Status: COMPLETED | OUTPATIENT
Start: 2020-06-21 | End: 2020-06-21

## 2020-06-21 RX ADMIN — ASPIRIN 81 MG: 81 TABLET, COATED ORAL at 08:49

## 2020-06-21 RX ADMIN — METOPROLOL TARTRATE 2.5 MG: 5 INJECTION, SOLUTION INTRAVENOUS at 00:29

## 2020-06-21 RX ADMIN — MUPIROCIN 1 APPLICATION: 20 OINTMENT TOPICAL at 17:52

## 2020-06-21 RX ADMIN — MUPIROCIN 1 APPLICATION: 20 OINTMENT TOPICAL at 06:12

## 2020-06-21 RX ADMIN — AMIODARONE HYDROCHLORIDE 400 MG: 200 TABLET ORAL at 08:50

## 2020-06-21 RX ADMIN — AMIODARONE HYDROCHLORIDE 400 MG: 200 TABLET ORAL at 17:51

## 2020-06-21 RX ADMIN — WARFARIN SODIUM 10 MG: 10 TABLET ORAL at 17:52

## 2020-06-21 RX ADMIN — OMEPRAZOLE 20 MG: 20 CAPSULE, DELAYED RELEASE ORAL at 06:12

## 2020-06-21 RX ADMIN — FUROSEMIDE 20 MG: 10 INJECTION, SOLUTION INTRAMUSCULAR; INTRAVENOUS at 06:12

## 2020-06-21 RX ADMIN — OXYCODONE 5 MG: 5 TABLET ORAL at 15:34

## 2020-06-21 RX ADMIN — SERTRALINE HYDROCHLORIDE 50 MG: 50 TABLET ORAL at 17:57

## 2020-06-21 ASSESSMENT — FIBROSIS 4 INDEX: FIB4 SCORE: 4.06

## 2020-06-21 ASSESSMENT — ENCOUNTER SYMPTOMS
WEIGHT LOSS: 0
PHOTOPHOBIA: 0
ABDOMINAL PAIN: 0
HEADACHES: 0
MEMORY LOSS: 0
TREMORS: 0
DIZZINESS: 0
BRUISES/BLEEDS EASILY: 0
PND: 0
BLOOD IN STOOL: 0
SEIZURES: 0
COUGH: 0
POLYDIPSIA: 0
CHILLS: 0
NAUSEA: 0
HEMOPTYSIS: 0
DOUBLE VISION: 0
HALLUCINATIONS: 0
FEVER: 0
FOCAL WEAKNESS: 0
SPEECH CHANGE: 0
PALPITATIONS: 0
VOMITING: 0
NERVOUS/ANXIOUS: 1
SHORTNESS OF BREATH: 0
ORTHOPNEA: 0
EYE PAIN: 0

## 2020-06-21 NOTE — PROGRESS NOTES
Pt began sustaining wide complex tachycardia at 130. Pt was asymptotic, VS'S.  On call was paged and orders received to stop amiodarone drip and to push 2.5 mg IV metoprolol and to give an additional 2.5 mg if pt still sustaining tachycardia.  Mag and calcium were also added to morning labs.

## 2020-06-21 NOTE — PROGRESS NOTES
Inpatient Anticoagulation Service Note    Date: 6/21/2020    Reason for Anticoagulation: Mitral Valve Repair, Atrial Fibrillation   Target INR: 2.0 to 3.0    [id number] VASc Score: 2  HAS-BLED Score: 2     Hemoglobin Value: (!) 9.6  Hematocrit Value: (!) 29.3  Lab Platelet Value: (!) 81    INR from last 7 days     Date/Time INR Value    06/21/20 0045  1.07    06/20/20 0230  (!) 1.21    06/19/20 0512  (!) 1.2    06/18/20 0450  (!) 1.24    06/17/20 1120  (!) 1.3    06/16/20 1100  0.94        Dose from last 7 days     Date/Time Dose (mg)    06/21/20 1800  10    06/19/20 1800  5    06/18/20 1800  7.5        Bridge Therapy: No     HPI: 74 yo female admitted on 6/17/20 for mitral valve repair. New start warfarin this admission.     Assessment: INR remains SUBtherapeutic and trending downward following 2 days of warfarin therapy. Anticipate ~ 5 days for warfarin to achieve full anticoagulation.   • Potential drug-drug interactions identified with acute inpatient medications: Amiodarone - currently overlapping IV and PO.   • Potential drug-drug interactions identified with chronic home medications: Aspirin (low dose) and Omeprazole which will become established interactions with warfarin therapy. Dose will be customized to patient to achieve an INR goal of 2 - 3.   o New start Zoloft (sertraline) 50 mg po daily will also become an established interaction with warfarin.   • Inpatient Diet: Cardiac Diet     Plan: Warfarin 10 mg po x one tonight followed by warfarin 7.5 mg po daily. INR monitoring daily as still honing in on patients warfarin regimen - dose adjustments may still be needed.     Education Material Provided?: Yes - warfarin information booklet and verbal counseling provided by clinical pharmacist on 6/21/20.     Pharmacist suggested discharge dosing: TBD. Anticipate Warfarin 7.5 mg po daily with plan for INR follow-up within 72 hours of discharge.      Chen Medina, PharmD, BCPS

## 2020-06-21 NOTE — PROGRESS NOTES
Cardiovascular Surgery Progress Note    Name: Frances Sanchez  MRN: [id number]  : 1945  Admit Date: 2020  5:45 AM  Procedure:  Procedure(s) and Anesthesia Type:     * MITRAL VALVE REPAIR - General     * ECHOCARDIOGRAM, TRANSESOPHAGEAL - General  4 Day Post-Op    Vitals:  Patient Vitals for the past 8 hrs:   Temp SpO2 O2 Delivery Device O2 (LPM) Pulse Resp BP Weight   20 0817 37 °C (98.6 °F) 94 % None - Room Air 0 75 16 134/56 --   20 0422 37.1 °C (98.8 °F) 95 % Silicone Nasal Cannula 1 64 17 115/57 67 kg (147 lb 11.3 oz)     Temp (24hrs), Av.1 °C (98.8 °F), Min:36.7 °C (98 °F), Max:37.4 °C (99.4 °F)      Respiratory:    Respiration: 16, Pulse Oximetry: 94 %     Chest Tube Drains:          Fluids:    Intake/Output Summary (Last 24 hours) at 2020 1108  Last data filed at 2020 1017  Gross per 24 hour   Intake 1014 ml   Output 1650 ml   Net -636 ml     Admit weight: Weight: 60 kg (132 lb 4.4 oz)  Current weight: Weight: 67 kg (147 lb 11.3 oz) (20 0422)    Labs:  Recent Labs     20  1336 20  0512 20  0230   WBC  --  15.5* 14.2*   RBC  --  3.14* 3.16*   HEMOGLOBIN 9.5* 9.4* 9.6*   HEMATOCRIT 29.9* 29.7* 29.3*   MCV  --  94.6 92.7   MCH  --  29.9 30.4   MCHC  --  31.6* 32.8*   RDW  --  47.7 45.4   PLATELETCT  --  81* 81*   MPV  --  13.4* 13.7*         Recent Labs     20  0512 20  0230 20  1915 20  0045   SODIUM 134* 128*  --  132*   POTASSIUM 4.3 4.3 4.1 4.1   CHLORIDE 101 97  --  99   CO2 22 23  --  26   GLUCOSE 85 117*  --  128*   BUN 15 15  --  13   CREATININE 0.74 0.65  --  0.63   CALCIUM 7.9* 8.0*  --  8.0*     Recent Labs     20  0512 20  0230 20  0045   INR 1.20* 1.21* 1.07       Medications:  • amiodarone  400 mg     • furosemide  20 mg     • omeprazole  20 mg     • warfarin  5 mg     • acetaminophen  1,000 mg     • senna-docusate  2 Tab      And   • polyethylene glycol/lytes  1 Packet      And   • magnesium  hydroxide  30 mL     • mupirocin  1 Application     • MD Alert...Warfarin per Pharmacy       • aspirin EC  81 mg     • Pharmacy Consult Request  1 Each          Ordered Medications:    ASA - Yes    Plavix - Yes    Post-operative Beta Blockers - No; contraindicated because of High risk for heart block    Ace Inhibitor - No; contraindicated because of Normal EF    Statin - No; contraindicated because of No CAD    Exam:   Review of Systems   Constitutional: Positive for malaise/fatigue. Negative for chills, fever and weight loss.   HENT: Negative for ear pain, nosebleeds and tinnitus.    Eyes: Negative for double vision, photophobia and pain.   Respiratory: Negative for cough, hemoptysis and shortness of breath.    Cardiovascular: Negative for chest pain (post operative), palpitations, orthopnea, leg swelling and PND.   Gastrointestinal: Negative for abdominal pain, blood in stool, nausea and vomiting.   Genitourinary: Negative for frequency, hematuria and urgency.   Musculoskeletal: Positive for joint pain.   Skin: Negative for rash.   Neurological: Negative for dizziness, tremors, speech change, focal weakness, seizures and headaches.   Endo/Heme/Allergies: Negative for polydipsia. Does not bruise/bleed easily.   Psychiatric/Behavioral: Negative for hallucinations and memory loss. The patient is nervous/anxious.        Physical Exam  Vitals signs and nursing note reviewed.   Constitutional:       Appearance: Normal appearance.   HENT:      Head: Normocephalic and atraumatic.      Nose: Nose normal.      Mouth/Throat:      Mouth: Mucous membranes are moist.      Pharynx: Oropharynx is clear.   Eyes:      Extraocular Movements: Extraocular movements intact.      Conjunctiva/sclera: Conjunctivae normal.      Pupils: Pupils are equal, round, and reactive to light.   Neck:      Musculoskeletal: Normal range of motion and neck supple.   Cardiovascular:      Rate and Rhythm: Normal rate and regular rhythm.      Pulses:  Normal pulses.   Pulmonary:      Effort: Pulmonary effort is normal. No respiratory distress.   Chest:      Chest wall: Tenderness present.   Abdominal:      General: Abdomen is flat. Bowel sounds are normal.      Palpations: Abdomen is soft.   Musculoskeletal: Normal range of motion.      Right lower leg: No edema.      Left lower leg: No edema.   Skin:     General: Skin is warm and dry.      Coloration: Skin is not jaundiced.      Findings: No rash.   Neurological:      General: No focal deficit present.      Mental Status: She is alert and oriented to person, place, and time. Mental status is at baseline.   Psychiatric:         Mood and Affect: Mood normal.       Quality Measures:   Quality-Core Measures   Reviewed items::  EKG reviewed, Labs reviewed, Medications reviewed and Radiology images reviewed  Hickman catheter::  One or Two Days Post Surgery (Day of Surgery being Day 0)  DVT prophylaxis pharmacological::  Enoxaparin (Lovenox)  DVT prophylaxis - mechanical:  Foot Pumps  Ulcer Prophylaxis::  No    Assessment/Plan:  POD #1:  Off all gtts.  Neuro intact.  Pain controlled.  High anxiety.  Moderate chest tube output overnight, improved this morning.  Fluid/weight up. Nauseated. Plan:  Begin gentle diuresis.  Started Ativan PRN.  Given Zofran.  Plan to remove chest tubes at 1300 if <100 out since 0600. Hickman out at same time.  Start omeprazole.  Up and out of bed as tolerated.    POD 2  HDS, afib, neuro intact, wounds CDI, abdomen S/NT, fluid balance positive, wt up.  Chest tube serous.  Plan:  Dc chest tubes and hickman, continue to diurese. IS/ambulate. CPM.  POD 3  HDS, SR, neuro intact, wounds CDI, abdomen S/NT, fluid balance trending down, wt at baseline, INR subtherapeutic.  Plan:Transfer to tele.  DC pacing wires.  Continue to diurese.    POD 4  HDS, SR was afib during night and switched bundles, now NSR, neuro intact, wounds CDI, abdomen S/NT, fluid balance euvolemic, wt stable.  INR subtherapeutic.  Plan:   DC IV amiodarone, continue to diurese.  BB, IS/ambulate. CPM.    Active Hospital Problems    Diagnosis   • Severe mitral regurgitation [I34.0]     Priority: High

## 2020-06-21 NOTE — CARE PLAN
Problem: Post Op Day 3 CABG/Heart Valve replacement  Goal: Optimal care of the post op CABG/Heart Valve replacement post op day 3  Intervention: Daily Weights  Note: Completed  Intervention: Shower daily and clean incisions twice daily with soap and water  Note: Completed  Intervention: Up in chair for all meals  Note: NA  Intervention: Ambulate, increasing the distance each time x 3 and before bed  Note: Pt ambulated 150 ft before bed.  Intervention: IS q 1 hour while awake and record best IS volume  Note: Pt educated on importance of IS use.   Intervention: Consider removal of hickman, chest tube and pacer wire if not already done  Note: NA  Intervention: Case management and  for discharge needs  Note: NA

## 2020-06-21 NOTE — PROGRESS NOTES
Monitor Summary    SR 60 - 77 / A-fib 108 - 130 / touched up to 140's bundle branch beats / SB 54  -/.08/-

## 2020-06-21 NOTE — PROGRESS NOTES
Received report from night staff. Assumed pt care. Pain level 3/10. AOX4. POC discussed. Tele monitor in place. Call light within reach, bed in lowest position, and personal items accessible.

## 2020-06-21 NOTE — PROGRESS NOTES
Pt converted to Afib at 1825, confirmed by STAT EKG. PETRA Villafana notified, new orders rec'd to re-start amiodarone drip and bolus per protocol. Stat Mg drawn per protocol.

## 2020-06-21 NOTE — CARE PLAN
Problem: Post Op Day 3 CABG/Heart Valve replacement  Goal: Optimal care of the post op CABG/Heart Valve replacement post op day 3  Outcome: PROGRESSING AS EXPECTED

## 2020-06-21 NOTE — PROGRESS NOTES
Pt had 43 beats of vtach at 1900. Pt asymptomatic, VSS.  Potassium drawn per protocol, magnesium already drawn. NOC RN to page APRN to update once K and Mg labs are resulted.

## 2020-06-22 LAB
ANION GAP SERPL CALC-SCNC: 12 MMOL/L (ref 7–16)
BUN SERPL-MCNC: 12 MG/DL (ref 8–22)
CALCIUM SERPL-MCNC: 8.5 MG/DL (ref 8.5–10.5)
CHLORIDE SERPL-SCNC: 95 MMOL/L (ref 96–112)
CO2 SERPL-SCNC: 28 MMOL/L (ref 20–33)
CREAT SERPL-MCNC: 0.72 MG/DL (ref 0.5–1.4)
EKG IMPRESSION: NORMAL
GLUCOSE SERPL-MCNC: 133 MG/DL (ref 65–99)
INR PPP: 1.25 (ref 0.87–1.13)
POTASSIUM SERPL-SCNC: 4.2 MMOL/L (ref 3.6–5.5)
PROTHROMBIN TIME: 16.1 SEC (ref 12–14.6)
SODIUM SERPL-SCNC: 135 MMOL/L (ref 135–145)

## 2020-06-22 PROCEDURE — A9270 NON-COVERED ITEM OR SERVICE: HCPCS | Performed by: PHYSICIAN ASSISTANT

## 2020-06-22 PROCEDURE — 700102 HCHG RX REV CODE 250 W/ 637 OVERRIDE(OP): Performed by: THORACIC SURGERY (CARDIOTHORACIC VASCULAR SURGERY)

## 2020-06-22 PROCEDURE — 700102 HCHG RX REV CODE 250 W/ 637 OVERRIDE(OP): Performed by: PHYSICIAN ASSISTANT

## 2020-06-22 PROCEDURE — 80048 BASIC METABOLIC PNL TOTAL CA: CPT

## 2020-06-22 PROCEDURE — A9270 NON-COVERED ITEM OR SERVICE: HCPCS | Performed by: THORACIC SURGERY (CARDIOTHORACIC VASCULAR SURGERY)

## 2020-06-22 PROCEDURE — A9270 NON-COVERED ITEM OR SERVICE: HCPCS | Performed by: CLINICAL NURSE SPECIALIST

## 2020-06-22 PROCEDURE — 99024 POSTOP FOLLOW-UP VISIT: CPT | Performed by: THORACIC SURGERY (CARDIOTHORACIC VASCULAR SURGERY)

## 2020-06-22 PROCEDURE — 700102 HCHG RX REV CODE 250 W/ 637 OVERRIDE(OP): Performed by: CLINICAL NURSE SPECIALIST

## 2020-06-22 PROCEDURE — 770020 HCHG ROOM/CARE - TELE (206)

## 2020-06-22 PROCEDURE — 93005 ELECTROCARDIOGRAM TRACING: CPT | Performed by: THORACIC SURGERY (CARDIOTHORACIC VASCULAR SURGERY)

## 2020-06-22 PROCEDURE — 93010 ELECTROCARDIOGRAM REPORT: CPT | Performed by: INTERNAL MEDICINE

## 2020-06-22 PROCEDURE — 85610 PROTHROMBIN TIME: CPT

## 2020-06-22 PROCEDURE — 700111 HCHG RX REV CODE 636 W/ 250 OVERRIDE (IP): Performed by: PHYSICIAN ASSISTANT

## 2020-06-22 RX ADMIN — WARFARIN SODIUM 7.5 MG: 7.5 TABLET ORAL at 18:04

## 2020-06-22 RX ADMIN — ASPIRIN 81 MG: 81 TABLET, COATED ORAL at 05:19

## 2020-06-22 RX ADMIN — PROCHLORPERAZINE EDISYLATE 10 MG: 5 INJECTION INTRAMUSCULAR; INTRAVENOUS at 11:33

## 2020-06-22 RX ADMIN — AMIODARONE HYDROCHLORIDE 400 MG: 200 TABLET ORAL at 18:04

## 2020-06-22 RX ADMIN — FUROSEMIDE 20 MG: 10 INJECTION, SOLUTION INTRAMUSCULAR; INTRAVENOUS at 05:19

## 2020-06-22 RX ADMIN — AMIODARONE HYDROCHLORIDE 400 MG: 200 TABLET ORAL at 05:19

## 2020-06-22 RX ADMIN — LORAZEPAM 0.25 MG: 1 TABLET ORAL at 08:38

## 2020-06-22 RX ADMIN — METOPROLOL TARTRATE 12.5 MG: 25 TABLET, FILM COATED ORAL at 18:03

## 2020-06-22 RX ADMIN — METOPROLOL TARTRATE 12.5 MG: 25 TABLET, FILM COATED ORAL at 11:33

## 2020-06-22 RX ADMIN — OXYCODONE 5 MG: 5 TABLET ORAL at 18:04

## 2020-06-22 RX ADMIN — ONDANSETRON 8 MG: 2 INJECTION INTRAMUSCULAR; INTRAVENOUS at 05:28

## 2020-06-22 RX ADMIN — PROCHLORPERAZINE EDISYLATE 10 MG: 5 INJECTION INTRAMUSCULAR; INTRAVENOUS at 19:12

## 2020-06-22 RX ADMIN — ALUMINUM HYDROXIDE, MAGNESIUM HYDROXIDE,SIMETHICONE 30 ML: 400; 400; 40 LIQUID ORAL at 05:18

## 2020-06-22 ASSESSMENT — ENCOUNTER SYMPTOMS
SHORTNESS OF BREATH: 0
BRUISES/BLEEDS EASILY: 0
PND: 0
ORTHOPNEA: 0
VOMITING: 0
PHOTOPHOBIA: 0
POLYDIPSIA: 0
FEVER: 0
SPEECH CHANGE: 0
ABDOMINAL PAIN: 0
HALLUCINATIONS: 0
NAUSEA: 0
SEIZURES: 0
NERVOUS/ANXIOUS: 1
DOUBLE VISION: 0
WEIGHT LOSS: 0
EYE PAIN: 0
BLOOD IN STOOL: 0
HEADACHES: 0
HEMOPTYSIS: 0
PALPITATIONS: 0
CHILLS: 0
MEMORY LOSS: 0
TREMORS: 0
FOCAL WEAKNESS: 0
COUGH: 0
DIZZINESS: 0

## 2020-06-22 ASSESSMENT — FIBROSIS 4 INDEX: FIB4 SCORE: 4.06

## 2020-06-22 NOTE — CARE PLAN
Problem: Post Op Day 4 CABG/Heart Valve Replacement  Goal: Optimal care of the Post Op CABG/Heart Valve replacement Post Op Day 4  Intervention: Shower daily and clean incisions twice daily with soap and water  Note: Completed  Intervention: Ambulate, increasing the distance each time x 3 and before bed  Note: Pt ambulated 500 ft before bed.   Intervention: IS q 1 hour while awake and record best IS volume  Note: Pt educated on importance of IS use.

## 2020-06-22 NOTE — PROGRESS NOTES
Cardiac Surgery RN Navigator Daily Rounding Note  Procedure Performed: Procedure(s):  MITRAL VALVE REPAIR  ECHOCARDIOGRAM, TRANSESOPHAGEAL     By  Dr. Bryan  5 Days Post-Op    Pertinent Overnight Events:    Nausea and vomiting overnight-no ativan utilized  Zoloft started yesterday    Patient went into a fib again last night- no IV amio ordered; on PO amio.    Pending lab results this am     Pertinent neuro findings/needs: A&O; patient is nauseous and states being anxious    Cardiac/hemodynamics:  Temp (24hrs), Av °C (98.6 °F), Min:36.1 °C (97 °F), Max:37.5 °C (99.5 °F)    Heart rhythm: A fib-mostly rate controlled  Temp:  [36.1 °C (97 °F)-37.5 °C (99.5 °F)] 37.5 °C (99.5 °F)  Pulse:  [] 79  Resp:  [16-19] 19  BP: (108-156)/(54-77) 156/77  SpO2:  [92 %-95 %] 93 %      IV gtts: amiodarone infusion, Last Rate: Stopped (20 0027)      12-hour chest tube output: n/a    /Weights:  Admit weight: Weight: 60 kg (132 lb 4.4 oz)  Current Weight: Weight: 65.2 kg (143 lb 11.8 oz) (20 0542)  Weight change: -1.9 kg (-4 lb 3 oz)    Intake/Output Summary (Last 24 hours) at 2020 0725  Last data filed at 2020 0600  Gross per 24 hour   Intake 974 ml   Output 2550 ml   Net -1576 ml       Adequate urine output: 900 cc yes    Respiratory:      Pertinent respiratory findings/needs: none on RA    GI findings/needs: + BM     Labs:  Recent Labs     20  0230   WBC 14.2*   RBC 3.16*   HEMOGLOBIN 9.6*   HEMATOCRIT 29.3*   MCV 92.7   MCH 30.4   MCHC 32.8*   RDW 45.4   PLATELETCT 81*   MPV 13.7*     Recent Labs     20  0230 20  1915 20  0045   SODIUM 128*  --  132*   POTASSIUM 4.3 4.1 4.1   CHLORIDE 97  --  99   CO2 23  --  26   GLUCOSE 117*  --  128*   BUN 15  --  13   CREATININE 0.65  --  0.63   CALCIUM 8.0*  --  8.0*     INR:   Recent Labs     20  0512 20  0230 20  0045   INR 1.20* 1.21* 1.07       Required Cardiac medications review:  ASA:  Yes  Plavix: No;  contraindicated because of On Coumadin / NOAC  BB: No; contraindicated because of High risk for heart block  ACE/ARB: No; contraindicated because of Normal EF  Statin: no CAD  Diuretic: yes    LDA's necessity reviewed: yes-central line only      Thoughts and considerations for team rounding:    Needs Epic order for Home Health for social work to collect choice from patient and be accepted prior to discharge.    Discharge plan:    Likely home with  with HH-date. TBD

## 2020-06-22 NOTE — PROGRESS NOTES
Patient experiencing ongoing nausea. PRN zofran given prior to day shift, PRN ativan given at 0838 with no relief. PRN compazine given at 1133. Patient stilll refusing ambulation at this time due to nausea and fatigue, despite RN education on importance of ambulation 4x day per MD orders.

## 2020-06-22 NOTE — PROGRESS NOTES
PT converted to afib at 2034.  EKG was obtained to confirm.  PT's HR is , VS'S.  [facility] was called and updated no new orders were received.

## 2020-06-22 NOTE — DISCHARGE PLANNING
Agency/Facility Name: [facility]  Spoke To: Lucie   Outcome: Per Lucie referral is under review and will have an update tomorrow morning.

## 2020-06-22 NOTE — DISCHARGE PLANNING
Received Choice form at [phone]  Agency/Facility Name: [facility]  Referral sent per Choice form @ [phone]

## 2020-06-22 NOTE — DISCHARGE PLANNING
Anticipated Discharge Disposition: home health w/ PT/INR draws    Action: spoke with pt at beside. Physical address [address], [place] [address]. Confirmed facesheet info w/ pt. Pt states she has ride home from family.  Aleksandar [phone], daughter Niki [phone]. Pt has no preference for home health agency  Faxed choice to [facility]. PT/INR 6/23 if dc home today  [facility]    Barriers to Discharge: HH acceptance/[place] area    Plan: 1520: still pending HH acceptance. Need to refax lab order if DC tomorrow (PT/INR draw next day after the day of DC)  1615:still pending HH acceptance. Place lab order back in physical chart.    1620: spoke w/  at bedside.  would like to take pt home tonight and can take pt to lab for PT/INR at outpatient lab tomorrow  Spoke w/ NHUNG Singleton. Per Areli, pt not medically cleared due to lab not in therapeutic range.

## 2020-06-22 NOTE — PROGRESS NOTES
Received report from night staff. Assumed pt care. Pain level 2/10. AOX4. POC discussed. Tele monitor in place. Call light within reach, bed in lowest position, and personal items accessible.     Patient did not complete morning walk with night shift due to nausea/vomitting, 8 mg zofran PRN given per orders. Patient continues to feel nauseas, 0.25mg ativan given at 0838. Patient not motivated to complete 1st walk with day RN. Will continue to strongly encourage ambulation once nausea has subsided. Very poor PO intake of morning meal.

## 2020-06-22 NOTE — PROGRESS NOTES
Unable to draw morning labs on pt.  Central line is flushing but not able to draw back enough blood for lab test.  Lab was called to come and draw labs peripherally.

## 2020-06-22 NOTE — CARE PLAN
"  Problem: Post Op Day 5 CABG/Heart Valve Replacement  Goal: Optimal care of the Post Op CABG/Heart Valve replacement Post Op Day 5  Intervention: Daily Weights  6/22/2020 0903 by Areli Arauz RMonicaN.  Note: Complete at 0540  6/22/2020 0831 by Areli Arauz R.N.  Note: Complete at 0542  Intervention: Shower daily and clean incisions twice daily with soap and water  6/22/2020 0903 by Areli Arauz R.N.  Note: Will complete this afternoon  6/22/2020 0831 by JINNY MahanN.  Note: Complete in afternoon  Intervention: Up in chair for all meals  6/22/2020 0903 by Areli Arauz R.N.  Note: Complete  6/22/2020 0831 by Areli Arauz RINDU.  Note: Complete. Tolerating well  Intervention: Ambulate, increasing the distance each time x 3 and before bed  Note: Missed morning walk with night shift due to nausea. Patient refused first walk with day shift RN due to nausea and \"feeling like a different person\"  Intervention: IS q 1 hour while awake and record best IS volume  Note: Best volume 1100. Low motivation to complete task on own.  Intervention: Consider removal of hickman, chest tube and pacer wire if not already done  Note: Done     "

## 2020-06-22 NOTE — PROGRESS NOTES
Cardiovascular Surgery Progress Note    Name: Frances Sanchez  MRN: [id number]  : 1945  Admit Date: 2020  5:45 AM  Procedure:  Procedure(s) and Anesthesia Type:     * MITRAL VALVE REPAIR - General     * ECHOCARDIOGRAM, TRANSESOPHAGEAL - General  5 Day Post-Op    Vitals:  Patient Vitals for the past 8 hrs:   Temp SpO2 O2 Delivery Device O2 (LPM) Pulse Resp BP Weight   20 0542 37.5 °C (99.5 °F) 93 % None - Room Air 0 79 19 156/77 65.2 kg (143 lb 11.8 oz)     Temp (24hrs), Av °C (98.6 °F), Min:36.1 °C (97 °F), Max:37.5 °C (99.5 °F)      Respiratory:    Respiration: 19, Pulse Oximetry: 93 %     Chest Tube Drains:          Fluids:    Intake/Output Summary (Last 24 hours) at 2020 0909  Last data filed at 2020 0800  Gross per 24 hour   Intake 1024 ml   Output 2800 ml   Net -1776 ml     Admit weight: Weight: 60 kg (132 lb 4.4 oz)  Current weight: Weight: 65.2 kg (143 lb 11.8 oz) (20 0542)    Labs:  Recent Labs     20  023   WBC 14.2*   RBC 3.16*   HEMOGLOBIN 9.6*   HEMATOCRIT 29.3*   MCV 92.7   MCH 30.4   MCHC 32.8*   RDW 45.4   PLATELETCT 81*   MPV 13.7*         Recent Labs     20  0230 205 20  0727   SODIUM 128*  --  132* 135   POTASSIUM 4.3 4.1 4.1 4.2   CHLORIDE 97  --  99 95*   CO2 23  --  26 28   GLUCOSE 117*  --  128* 133*   BUN 15  --  13 12   CREATININE 0.65  --  0.63 0.72   CALCIUM 8.0*  --  8.0* 8.5     Recent Labs     20  0230 205 20  0727   INR 1.21* 1.07 1.25*       Medications:  • metoprolol  25 mg     • warfarin  7.5 mg     • sertraline  50 mg     • amiodarone  400 mg     • furosemide  20 mg     • omeprazole  20 mg     • acetaminophen  1,000 mg     • senna-docusate  2 Tab      And   • polyethylene glycol/lytes  1 Packet      And   • magnesium hydroxide  30 mL     • MD Alert...Warfarin per Pharmacy       • aspirin EC  81 mg     • Pharmacy Consult Request  1 Each          Ordered Medications:    ASA -  Yes    Plavix - Yes    Post-operative Beta Blockers - Yes    Ace Inhibitor - No; contraindicated because of Normal EF    Statin - No; contraindicated because of No CAD    Exam:   Review of Systems   Constitutional: Positive for malaise/fatigue. Negative for chills, fever and weight loss.   HENT: Negative for ear pain, nosebleeds and tinnitus.    Eyes: Negative for double vision, photophobia and pain.   Respiratory: Negative for cough, hemoptysis and shortness of breath.    Cardiovascular: Negative for chest pain (post operative), palpitations, orthopnea, leg swelling and PND.   Gastrointestinal: Negative for abdominal pain, blood in stool, nausea and vomiting.   Genitourinary: Negative for frequency, hematuria and urgency.   Musculoskeletal: Positive for joint pain.   Skin: Negative for rash.   Neurological: Negative for dizziness, tremors, speech change, focal weakness, seizures and headaches.   Endo/Heme/Allergies: Negative for polydipsia. Does not bruise/bleed easily.   Psychiatric/Behavioral: Negative for hallucinations and memory loss. The patient is nervous/anxious.        Physical Exam  Vitals signs and nursing note reviewed.   Constitutional:       Appearance: Normal appearance.   HENT:      Head: Normocephalic and atraumatic.      Nose: Nose normal.      Mouth/Throat:      Mouth: Mucous membranes are moist.      Pharynx: Oropharynx is clear.   Eyes:      Extraocular Movements: Extraocular movements intact.      Conjunctiva/sclera: Conjunctivae normal.      Pupils: Pupils are equal, round, and reactive to light.   Neck:      Musculoskeletal: Normal range of motion and neck supple.   Cardiovascular:      Rate and Rhythm: Normal rate and regular rhythm.      Pulses: Normal pulses.   Pulmonary:      Effort: Pulmonary effort is normal. No respiratory distress.   Chest:      Chest wall: Tenderness present.   Abdominal:      General: Abdomen is flat. Bowel sounds are normal.      Palpations: Abdomen is soft.    Musculoskeletal: Normal range of motion.      Right lower leg: No edema.      Left lower leg: No edema.   Skin:     General: Skin is warm and dry.      Coloration: Skin is not jaundiced.      Findings: No rash.   Neurological:      General: No focal deficit present.      Mental Status: She is alert and oriented to person, place, and time. Mental status is at baseline.   Psychiatric:         Mood and Affect: Mood is anxious.         Speech: Speech normal.       Quality Measures:   Quality-Core Measures   Reviewed items::  EKG reviewed, Labs reviewed, Medications reviewed and Radiology images reviewed  Hickman catheter::  One or Two Days Post Surgery (Day of Surgery being Day 0)  DVT prophylaxis pharmacological::  Warfarin (Coumadin)  DVT prophylaxis - mechanical:  Foot Pumps  Ulcer Prophylaxis::  No    Assessment/Plan:  POD #1:  Off all gtts.  Neuro intact.  Pain controlled.  High anxiety.  Moderate chest tube output overnight, improved this morning.  Fluid/weight up. Nauseated. Plan:  Begin gentle diuresis.  Started Ativan PRN.  Given Zofran.  Plan to remove chest tubes at 1300 if <100 out since 0600. Hickman out at same time.  Start omeprazole.  Up and out of bed as tolerated.    POD 2  HDS, afib, neuro intact, wounds CDI, abdomen S/NT, fluid balance positive, wt up.  Chest tube serous.  Plan:  Dc chest tubes and hickman, continue to diurese. IS/ambulate. CPM.  POD 3  HDS, SR, neuro intact, wounds CDI, abdomen S/NT, fluid balance trending down, wt at baseline, INR subtherapeutic.  Plan:Transfer to tele.  DC pacing wires.  Continue to diurese.    POD 4  HDS, SR was afib during night and switched bundles, now NSR, neuro intact, wounds CDI, abdomen S/NT, fluid balance euvolemic, wt stable.  INR subtherapeutic.  Plan:  DC IV amiodarone, continue to diurese.  BB, IS/ambulate. CPM.  POD 5:  HDS.  Afib overnight.  Diuresing well, weight up.  INR remains subtherapeutic. Patient anxious.  Plan: Begin oral bblockers.  Continue  diuresis.  Continue oral Amio.  Encourage IS/ambulate.  Discussed importance of taking all medications as one dose of Coumadin was refused.  Home health orders placed.  Plan to discharge home tomorrow with .    Active Hospital Problems    Diagnosis   • Severe mitral regurgitation [I34.0]     Priority: High

## 2020-06-22 NOTE — PROGRESS NOTES
Inpatient Anticoagulation Service Note    Date: 6/22/2020    Reason for Anticoagulation: Atrial Fibrillation, Mitral Valve Repair   Target INR: 2.0 to 3.0  [id number] VASc Score: 2  HAS-BLED Score: 2   Hemoglobin Value: (!) 9.6  Hematocrit Value: (!) 29.3  Lab Platelet Value: (!) 81    INR from last 7 days     Date/Time INR Value    06/22/20 0727  (!) 1.25    06/21/20 0045  1.07    06/20/20 0230  (!) 1.21    06/19/20 0512  (!) 1.2    06/18/20 0450  (!) 1.24    06/17/20 1120  (!) 1.3    06/16/20 1100  0.94        Dose from last 7 days     Date/Time Dose (mg)    06/22/20 0832  7.5    06/21/20 1152  10    06/19/20 1047  5    06/18/20 1250  7.5        Average Dose (mg): 0(new start)  Significant Interactions: Amiodarone, Aspirin, Proton Pump Inhibitor  Bridge Therapy: No     Reversal Agent Administered: Not Applicable    Comments: Admitted for mitral valve repair on 6/17.  H/O afib however new start warfarin this admission.  DDI include aspirin, omeprazole, amiodarone.  INR increasing after 10mg dose, however trend was disrupted on 6/20 d/t med refusal by patient.  No s/sx of bleeding noted per chart reivew.  Will continue 7.5mg daily at this time.    Plan:  Warfarin 7.5mg. INR in AM.  Education Material Provided?: Yes(by Pharmacist 6/21/20)  Pharmacist suggested discharge dosing: Potentially 7.5mg daily with follow up within 3 days of discharge.     Balwinder Singletary, AkbarD

## 2020-06-22 NOTE — DISCHARGE PLANNING
Received Choice form at 1435  Agency/Facility Name: [facility]   Referral sent per Choice form @ 1436     Agency/Facility Name: [facility]  Outcome: Per Faxed received referral decline due to non covered service area     Received Choice form at 1335  Agency/Facility Name: [facility]  Referral sent per Choice form @ 133     Agency[facility]Facility Name: [facility]  Spoke: Admissions   Outcome: Per Admission referral decline due to non covered service area. Per admissions try [facility].

## 2020-06-23 VITALS
HEART RATE: 70 BPM | DIASTOLIC BLOOD PRESSURE: 91 MMHG | SYSTOLIC BLOOD PRESSURE: 120 MMHG | HEIGHT: 67 IN | TEMPERATURE: 99 F | WEIGHT: 139.33 LBS | RESPIRATION RATE: 18 BRPM | OXYGEN SATURATION: 93 % | BODY MASS INDEX: 21.87 KG/M2

## 2020-06-23 LAB
EKG IMPRESSION: NORMAL
INR PPP: 1.65 (ref 0.87–1.13)
PROTHROMBIN TIME: 20 SEC (ref 12–14.6)

## 2020-06-23 PROCEDURE — A9270 NON-COVERED ITEM OR SERVICE: HCPCS | Performed by: PHYSICIAN ASSISTANT

## 2020-06-23 PROCEDURE — 99024 POSTOP FOLLOW-UP VISIT: CPT | Performed by: THORACIC SURGERY (CARDIOTHORACIC VASCULAR SURGERY)

## 2020-06-23 PROCEDURE — 36415 COLL VENOUS BLD VENIPUNCTURE: CPT

## 2020-06-23 PROCEDURE — 700102 HCHG RX REV CODE 250 W/ 637 OVERRIDE(OP): Performed by: CLINICAL NURSE SPECIALIST

## 2020-06-23 PROCEDURE — 85610 PROTHROMBIN TIME: CPT

## 2020-06-23 PROCEDURE — 700111 HCHG RX REV CODE 636 W/ 250 OVERRIDE (IP): Performed by: PHYSICIAN ASSISTANT

## 2020-06-23 PROCEDURE — A9270 NON-COVERED ITEM OR SERVICE: HCPCS | Performed by: CLINICAL NURSE SPECIALIST

## 2020-06-23 PROCEDURE — 700102 HCHG RX REV CODE 250 W/ 637 OVERRIDE(OP): Performed by: PHYSICIAN ASSISTANT

## 2020-06-23 PROCEDURE — 93010 ELECTROCARDIOGRAM REPORT: CPT | Performed by: INTERNAL MEDICINE

## 2020-06-23 RX ORDER — POTASSIUM CHLORIDE 750 MG/1
10 TABLET, EXTENDED RELEASE ORAL DAILY
Qty: 30 TAB | Refills: 3 | Status: SHIPPED | OUTPATIENT
Start: 2020-06-23 | End: 2020-07-15

## 2020-06-23 RX ORDER — OXYCODONE HYDROCHLORIDE 5 MG/1
5 TABLET ORAL EVERY 8 HOURS PRN
Qty: 30 TAB | Refills: 0 | Status: SHIPPED | OUTPATIENT
Start: 2020-06-23 | End: 2020-07-03

## 2020-06-23 RX ORDER — WARFARIN SODIUM 5 MG/1
5 TABLET ORAL DAILY
Qty: 30 TAB | Refills: 3 | Status: SHIPPED | OUTPATIENT
Start: 2020-06-23 | End: 2020-10-27 | Stop reason: SDUPTHER

## 2020-06-23 RX ORDER — OMEPRAZOLE 20 MG/1
20 CAPSULE, DELAYED RELEASE ORAL DAILY
Qty: 30 CAP | Refills: 2 | Status: SHIPPED | OUTPATIENT
Start: 2020-06-24 | End: 2020-07-15

## 2020-06-23 RX ORDER — FUROSEMIDE 20 MG/1
20 TABLET ORAL DAILY
Qty: 30 TAB | Refills: 3 | Status: SHIPPED | OUTPATIENT
Start: 2020-06-23 | End: 2020-07-15

## 2020-06-23 RX ORDER — AMIODARONE HYDROCHLORIDE 400 MG/1
400 TABLET ORAL 2 TIMES DAILY
Qty: 37 TAB | Refills: 2 | Status: SHIPPED | OUTPATIENT
Start: 2020-06-23 | End: 2020-07-15

## 2020-06-23 RX ADMIN — METOPROLOL TARTRATE 12.5 MG: 25 TABLET, FILM COATED ORAL at 05:06

## 2020-06-23 RX ADMIN — SERTRALINE HYDROCHLORIDE 50 MG: 50 TABLET ORAL at 05:03

## 2020-06-23 RX ADMIN — AMIODARONE HYDROCHLORIDE 400 MG: 200 TABLET ORAL at 05:03

## 2020-06-23 RX ADMIN — FUROSEMIDE 20 MG: 10 INJECTION, SOLUTION INTRAMUSCULAR; INTRAVENOUS at 05:03

## 2020-06-23 RX ADMIN — OMEPRAZOLE 20 MG: 20 CAPSULE, DELAYED RELEASE ORAL at 05:02

## 2020-06-23 RX ADMIN — OXYCODONE 5 MG: 5 TABLET ORAL at 09:18

## 2020-06-23 RX ADMIN — ASPIRIN 81 MG: 81 TABLET, COATED ORAL at 05:03

## 2020-06-23 ASSESSMENT — COGNITIVE AND FUNCTIONAL STATUS - GENERAL
MOBILITY SCORE: 19
STANDING UP FROM CHAIR USING ARMS: A LITTLE
TOILETING: A LITTLE
MOVING TO AND FROM BED TO CHAIR: A LITTLE
WALKING IN HOSPITAL ROOM: A LITTLE
CLIMB 3 TO 5 STEPS WITH RAILING: A LITTLE
SUGGESTED CMS G CODE MODIFIER DAILY ACTIVITY: CK
HELP NEEDED FOR BATHING: A LITTLE
DAILY ACTIVITIY SCORE: 19
SUGGESTED CMS G CODE MODIFIER MOBILITY: CK
PERSONAL GROOMING: A LITTLE
DRESSING REGULAR UPPER BODY CLOTHING: A LITTLE
DRESSING REGULAR LOWER BODY CLOTHING: A LITTLE
MOVING FROM LYING ON BACK TO SITTING ON SIDE OF FLAT BED: A LITTLE

## 2020-06-23 ASSESSMENT — ENCOUNTER SYMPTOMS
MUSCULOSKELETAL NEGATIVE: 1
CARDIOVASCULAR NEGATIVE: 1
NEUROLOGICAL NEGATIVE: 1
RESPIRATORY NEGATIVE: 1
PSYCHIATRIC NEGATIVE: 1
GASTROINTESTINAL NEGATIVE: 1
EYES NEGATIVE: 1

## 2020-06-23 ASSESSMENT — FIBROSIS 4 INDEX: FIB4 SCORE: 4.06

## 2020-06-23 NOTE — CARE PLAN
Problem: Post Op Day 4 CABG/Heart Valve Replacement  Goal: Optimal care of the Post Op CABG/Heart Valve replacement Post Op Day 4  Outcome: PROGRESSING AS EXPECTED  Intervention: Daily Weights  Note: Daily weight to obtained in AM  Intervention: Shower daily and clean incisions twice daily with soap and water  Note: Pt showered today with soap and water and incision cleaned x2  Intervention: Up in chair for all meals  Note: Pt has been up in bed for all meals   Intervention: Ambulate, increasing the distance each time x 3 and before bed  Note: Pt ambulated x3 today. Pt ambulated before bed tonight  Intervention: IS q 1 hour while awake and record best IS volume  Note: Best IS 1100  Intervention: Consider removal of hickman, chest tube and pacer wire if not already done  Note: completed

## 2020-06-23 NOTE — PROGRESS NOTES
Inpatient Anticoagulation Service Note    Date: 6/23/2020    Reason for Anticoagulation: Atrial Fibrillation, Mitral Valve Repair   Target INR: 2.0 to 3.0  [id number] VASc Score: 2  HAS-BLED Score: 2   Hemoglobin Value: (!) 9.6  Hematocrit Value: (!) 29.3  Lab Platelet Value: (!) 81    INR from last 7 days     Date/Time INR Value    06/23/20 0224  (!) 1.65    06/22/20 0727  (!) 1.25    06/21/20 0045  1.07    06/20/20 0230  (!) 1.21    06/19/20 0512  (!) 1.2    06/18/20 0450  (!) 1.24    06/17/20 1120  (!) 1.3    06/16/20 1100  0.94        Dose from last 7 days     Date/Time Dose (mg)    06/23/20 0839  7.5    06/22/20 0832  7.5    06/21/20 1152  10    06/19/20 1047  5    06/18/20 1250  7.5        Average Dose (mg): 0(new start)  Significant Interactions: Amiodarone, Aspirin, Proton Pump Inhibitor  Bridge Therapy: No    Reversal Agent Administered: Not Applicable  Comments: Admitted for mitral valve repair on 6/17.  H/O afib however new start warfarin this admission.  DDI include aspirin, omeprazole, amiodarone.      Med refusal on 6/20 initially complicated warfarin trend however now currently trending towards therapeutic level.  No s/sx of bleeding overnight. NNL. Will continue with 7.5mg tonight however may require dose reduction if INR increases at same interval as last night.    Plan:  Warfarin 7.5mg. INR in AM.  Education Material Provided?: Yes(by Pharmacist 6/21/20)  Pharmacist suggested discharge dosing: Warfarin 5mg MWF and 7.5mg all other days with follow up within 3 days of discharge.     Balwinder Singletary, AkbarD

## 2020-06-23 NOTE — PROGRESS NOTES
Monitor Summary: Afib/flutter/SR , as high as 150s/160s non sustaining, freq pvcs    Converted to SR at 1630, confirmed with EKG at 1650

## 2020-06-23 NOTE — PROGRESS NOTES
Cardiac Surgery RN Navigator Daily Rounding Note  Procedure Performed: Procedure(s):  MITRAL VALVE REPAIR  ECHOCARDIOGRAM, TRANSESOPHAGEAL     By  Dr. Bryan  6 Days Post-Op    Pertinent Overnight Events:    Patient had nausea yesterday unrelieved by ativan, Zofran or compazine.    BB started yesterday; converted to SR yesterday afternoon around 1600.    Difficulty getting HH acceptance due to service area being in [facility]-social work awaiting response from 5  agencies this morning.  Per notes,  willing to drive her for INR draw as he would like to take her home.    Diuresed well overnight; weight down    Pertinent neuro findings/needs: A&O    Cardiac/hemodynamics:  Temp (24hrs), Av.1 °C (98.7 °F), Min:36.6 °C (97.8 °F), Max:37.3 °C (99.2 °F)    Heart rhythm: NSR  Temp:  [36.6 °C (97.8 °F)-37.3 °C (99.2 °F)] 37 °C (98.6 °F)  Pulse:  [66-98] 85  Resp:  [15-16] 16  BP: (130-143)/(59-84) 143/66  SpO2:  [90 %-95 %] 92 %      IV gtts:      12-hour chest tube output: n/a    /Weights:  Admit weight: Weight: 60 kg (132 lb 4.4 oz)  Current Weight: Weight: 63.2 kg (139 lb 5.3 oz) (20 0454)  Weight change: -2 kg (-4 lb 6.6 oz)    Intake/Output Summary (Last 24 hours) at 2020 0720  Last data filed at 2020 0647  Gross per 24 hour   Intake 1250 ml   Output 4425 ml   Net -3175 ml       Adequate urine output: YES > 2 liters    Respiratory:      Pertinent respiratory findings/needs: none RA    GI findings/needs: + BM today     Labs:      Recent Labs     20  1915 20  0045 20  0727   SODIUM  --  132* 135   POTASSIUM 4.1 4.1 4.2   CHLORIDE  --  99 95*   CO2  --  26 28   GLUCOSE  --  128* 133*   BUN  --  13 12   CREATININE  --  0.63 0.72   CALCIUM  --  8.0* 8.5     INR:   Recent Labs     20  0230 20  0045 20  0727 20  0224   INR 1.21* 1.07 1.25* 1.65*       Required Cardiac medications review:  ASA:  Yes  Plavix: No; contraindicated because of On Coumadin /  NOAC  BB: Yes  ACE/ARB: No; contraindicated because of Normal EF  Statin: no CAD  Diuretic: yes    LDA's necessity reviewed: yes-central line    Thoughts and considerations for team rounding:    Spoke with  Aleksandar and Frances this morning. They decided if medically cleared by team to go home today and HH has still not accepted they are okay with driving to [place] for INR's with no home health setup.    Remove central line today-start PIV if not going to discharge? Line is not drawing anyway    Updated HH order for INR draw date and given to social work this morning.    Discharge plan:    INR 1.65 this am; keep one more day vs discharge home.

## 2020-06-23 NOTE — DISCHARGE PLANNING
Anticipated Discharge Disposition: home w/ [facility] for PT/INR 6/24    Action: pt accepted by [facility] . Refaxed PT/INR 6/24 to [facility]. Updated [facility]    Barriers to Discharge: none    Plan: home w/ [facility] health for PT/INR 6/24    1410: received call from Lucie at [facility]. Provided physical address to Lucie. Lucie states she didn't receive PT/INR lab order 6/24. Asked [facility] to refax lab order to [facility].

## 2020-06-23 NOTE — DISCHARGE INSTRUCTIONS
Discharge Instructions    Discharged to home by car with relative. Discharged via wheelchair, hospital escort: Yes.  Special equipment needed: Wheelchair    Be sure to schedule a follow-up appointment with your primary care doctor or any specialists as instructed.     Discharge Plan:   Diet Plan: Discussed  Activity Level: Discussed  Confirmed Follow up Appointment: Appointment Scheduled  Confirmed Symptoms Management: Discussed  Medication Reconciliation Updated: Yes    I understand that a diet low in cholesterol, fat, and sodium is recommended for good health. Unless I have been given specific instructions below for another diet, I accept this instruction as my diet prescription.   Other diet: cardiac, low sodium, low fat diet    Special Instructions: DIVISION OF CARDIAC SURGERY DISCHARGE INSTRUCTIONS    DISCHARGE INSTRUCTIONS DISCUSSED WITH THE PATIENT:       1. NO driving for 4 weeks after surgery. You may ride as a passenger.  2. NO lifting of any item over 10 lbs (e.g. gallon of milk) for 6 weeks after surgery.  3. DO walk as much as possible! Walk a minimum of once a day. Depending on your fatigue and comfort level, you may walk as much as you wish. There is no maximum.  4. Other physical activities (sex, housework, gardening, etc.) are OK after 4 weeks   5. Continue using incentive spirometer for 2 weeks, especially if going home on oxygen.     Incision Care:  1. SHOWER ONLY - no baths. Clean incision daily with plain Ivory ® soap or any other dye or perfume free soap. Then pat incision dry with clean towel. Avoid creams or lotions on the incision(s).  a. If there is any increase in redness or swelling, or separation of the incision line, or thick drainage* from any of the incisions, call right away  * Clear, thin drainage is not abnormal especially from the leg incision and/or chest tube sites.  2. Continue to wear your MARTA Stockings for 4 weeks. You may take off the stockings when in bed or when the legs  are elevated.    Activity:    1. NO driving for 4 weeks after surgery. You may ride as a passenger.  2. NO lifting more than 10 pounds for 6 weeks.  3. For the next 6 weeks, keep your elbows close to your body and move within a pain-free motion when lifting, pushing or pulling.  Do not stretch both arms backwards at the same time.    4. Walk at least 4 times per day, there is no maximum.  5. Other physical activities (sex, housework, gardening, etc.) are okay after 4 weeks.  6. Continue using incentive spirometer for 2 weeks.  If you are going home on oxygen and you were not on oxygen prior to surgery, keep using until you are oxygen free.  7. Weigh yourself daily.  Call your Cardiologist for a weight gain of 2 or more pounds within 48 hours.  8. Take all of your medications as prescribed    Incision Care:    1. SHOWER EVERYDAY-no baths. Make sure to clean your incision(s) twice daily with plain, perfume and dye-free soap.  Then pat incision(s) dry with clean towel. No creams or lotions on your incision(s).    2. If you are discharging with a Prevena bandage on your chest, you or your home health nurse may remove the bandage 7 days after surgery, or sooner if the battery runs out or the device alarms. When the battery runs out, the bandage will lose suction and it will puff up.  To remove, slowly roll down the edges of the bandage. Throw away the entire bandage and the battery pack.  Keep the incision open to air and clean twice daily with soap and water.  3. If there is any increased redness or swelling, separation of the incision line, or thick drainage from any of your incisions, call the Cardiac Surgeons ([phone]).    4. Continue to wear your MARTA Stockings for 4 weeks. You may take them off when you are in bed or when your legs are elevated.    General Instructions:    1. You have been referred to Cardiac Rehab.  You can start Cardiac Rehab 30 days after surgery.  If you do not have an appointment at the time  of discharge call [phone] to schedule an appointment.  2. Your Primary Care Doctor typically handles home oxygen. Oxygen may be stopped when your oxygen level is consistently greater than 90.  Check with your Primary Care Doctor if you are unsure.  3. Take all of your medications (including pain medications) as prescribed.  Taking medications other than prescribed can result in serious injury.    For Patients Discharged with Narcotic Pain Medication:     1. If a refill is needed, understand that only 1 refill will be provided and you must come to the Cardiac Surgeons’ office for an appointment (72 hours’ notice is required to schedule and there are no weekend appointments).  2. If the pain medications you are discharged on are not working, you will need to bring your remaining prescription into the office in order to receive a new prescription.  3. If you were taking narcotics prior to your heart surgery, the Cardiac Surgeons will provide you with one prescription and additional medications will need to be provided by your pain management doctor.  4. Do not drink alcohol while taking narcotics.  5. Lost or stolen medications will not be refilled.  If medications are stolen, report to law enforcement.    Contact Cardiac Surgery at [phone] if you have any questions.    · Is patient discharged on Warfarin / Coumadin?   Yes    You are on the blood thinner Coumadin (warfarin) and you will need your coumadin levels checked periodically. For any questions call the [facility] @ [phone]. The home health nurse will draw your blood and the coumadin clinic will call with any change to your dose.        IMPORTANT: HOW TO USE THIS INFORMATION:  This is a summary and does NOT have all possible information about this product. This information does not assure that this product is safe, effective, or appropriate for you. This information is not individual medical advice and does not substitute for the advice of  "your health care professional. Always ask your health care professional for complete information about this product and your specific health needs.      WARFARIN - ORAL (WARF-uh-rin)      COMMON BRAND NAME(S): Coumadin      WARNING:  Warfarin can cause very serious (possibly fatal) bleeding. This is more likely to occur when you first start taking this medication or if you take too much warfarin. To decrease your risk for bleeding, your doctor or other health care provider will monitor you closely and check your lab results (INR test) to make sure you are not taking too much warfarin. Keep all medical and laboratory appointments. Tell your doctor right away if you notice any signs of serious bleeding. See also Side Effects section.      USES:  This medication is used to treat blood clots (such as in deep vein thrombosis-DVT or pulmonary embolus-PE) and/or to prevent new clots from forming in your body. Preventing harmful blood clots helps to reduce the risk of a stroke or heart attack. Conditions that increase your risk of developing blood clots include a certain type of irregular heart rhythm (atrial fibrillation), heart valve replacement, recent heart attack, and certain surgeries (such as hip/knee replacement). Warfarin is commonly called a \"blood thinner,\" but the more correct term is \"anticoagulant.\" It helps to keep blood flowing smoothly in your body by decreasing the amount of certain substances (clotting proteins) in your blood.      HOW TO USE:  Read the Medication Guide provided by your pharmacist before you start taking warfarin and each time you get a refill. If you have any questions, ask your doctor or pharmacist. Take this medication by mouth with or without food as directed by your doctor or other health care professional, usually once a day. It is very important to take it exactly as directed. Do not increase the dose, take it more frequently, or stop using it unless directed by your doctor. Dosage " is based on your medical condition, laboratory tests (such as INR), and response to treatment. Your doctor or other health care provider will monitor you closely while you are taking this medication to determine the right dose for you. Use this medication regularly to get the most benefit from it. To help you remember, take it at the same time each day. It is important to eat a balanced, consistent diet while taking warfarin. Some foods can affect how warfarin works in your body and may affect your treatment and dose. Avoid sudden large increases or decreases in your intake of foods high in vitamin K (such as broccoli, cauliflower, cabbage, brussels sprouts, kale, spinach, and other green leafy vegetables, liver, green tea, certain vitamin supplements). If you are trying to lose weight, check with your doctor before you try to go on a diet. Cranberry products may also affect how your warfarin works. Limit the amount of cranberry juice (16 ounces/480 milliliters a day) or other cranberry products you may drink or eat.      SIDE EFFECTS:  Nausea, loss of appetite, or stomach/abdominal pain may occur. If any of these effects persist or worsen, tell your doctor or pharmacist promptly. Remember that your doctor has prescribed this medication because he or she has judged that the benefit to you is greater than the risk of side effects. Many people using this medication do not have serious side effects. This medication can cause serious bleeding if it affects your blood clotting proteins too much (shown by unusually high INR lab results). Even if your doctor stops your medication, this risk of bleeding can continue for up to a week. Tell your doctor right away if you have any signs of serious bleeding, including: unusual pain/swelling/discomfort, unusual/easy bruising, prolonged bleeding from cuts or gums, persistent/frequent nosebleeds, unusually heavy/prolonged menstrual flow, pink/dark urine, coughing up blood, vomit  that is bloody or looks like coffee grounds, severe headache, dizziness/fainting, unusual or persistent tiredness/weakness, bloody/black/tarry stools, chest pain, shortness of breath, difficulty swallowing. Tell your doctor right away if any of these unlikely but serious side effects occur: persistent nausea/vomiting, severe stomach/abdominal pain, yellowing eyes/skin. This drug rarely has caused very serious (possibly fatal) problems if its effects lead to small blood clots (usually at the beginning of treatment). This can lead to severe skin/tissue damage that may require surgery or amputation if left untreated. Patients with certain blood conditions (protein C or S deficiency) may be at greater risk. Get medical help right away if any of these rare but serious side effects occur: painful/red/purplish patches on the skin (such as on the toe, breast, abdomen), change in the amount of urine, vision changes, confusion, slurred speech, weakness on one side of the body. A very serious allergic reaction to this drug is rare. However, get medical help right away if you notice any symptoms of a serious allergic reaction, including: rash, itching/swelling (especially of the face/tongue/throat), severe dizziness, trouble breathing. This is not a complete list of possible side effects. If you notice other effects not listed above, contact your doctor or pharmacist. In the [place] - Call your doctor for medical advice about side effects. You may report side effects to FDA at 7-955-EMM-0909. In [place] - Call your doctor for medical advice about side effects. You may report side effects to [facility] [place] at 1-256.702.1778.      PRECAUTIONS:  Before taking warfarin, tell your doctor or pharmacist if you are allergic to it; or if you have any other allergies. This product may contain inactive ingredients, which can cause allergic reactions or other problems. Talk to your pharmacist for more details. Before using this medication, tell  your doctor or pharmacist your medical history, especially of: blood disorders (such as anemia, hemophilia), bleeding problems (such as bleeding of the stomach/intestines, bleeding in the brain), blood vessel disorders (such as aneurysms), recent major injury/surgery, liver disease, alcohol use, mental/mood disorders (including memory problems), frequent falls/injuries. It is important that all your doctors and dentists know that you take warfarin. Before having surgery or any medical/dental procedures, tell your doctor or dentist that you are taking this medication and about all the products you use (including prescription drugs, nonprescription drugs, and herbal products). Avoid getting injections into the muscles. If you must have an injection into a muscle (for example, a flu shot), it should be given in the arm. This way, it will be easier to check for bleeding and/or apply pressure bandages. This medication may cause stomach bleeding. Daily use of alcohol while using this medicine will increase your risk for stomach bleeding and may also affect how this medication works. Limit or avoid alcoholic beverages. If you have not been eating well, if you have an illness or infection that causes fever, vomiting, or diarrhea for more than 2 days, or if you start using any antibiotic medications, contact your doctor or pharmacist immediately because these conditions can affect how warfarin works. This medication can cause heavy bleeding. To lower the chance of getting cut, bruised, or injured, use great caution with sharp objects like safety razors and nail cutters. Use an electric razor when shaving and a soft toothbrush when brushing your teeth. Avoid activities such as contact sports. If you fall or injure yourself, especially if you hit your head, call your doctor immediately. Your doctor may need to check you. The Food & Drug Administration has stated that generic warfarin products are interchangeable. However,  "consult your doctor or pharmacist before switching warfarin products. Be careful not to take more than one medication that contains warfarin unless specifically directed by the doctor or health care provider who is monitoring your warfarin treatment. Older adults may be at greater risk for bleeding while using this drug. This medication is not recommended for use during pregnancy because of serious (possibly fatal) harm to an unborn baby. Discuss the use of reliable forms of birth control with your doctor. If you become pregnant or think you may be pregnant, tell your doctor immediately. If you are planning pregnancy, discuss a plan for managing your condition with your doctor before you become pregnant. Your doctor may switch the type of medication you use during pregnancy. Very small amounts of this medication may pass into breast milk but is unlikely to harm a nursing infant. Consult your doctor before breast-feeding.      DRUG INTERACTIONS:  Drug interactions may change how your medications work or increase your risk for serious side effects. This document does not contain all possible drug interactions. Keep a list of all the products you use (including prescription/nonprescription drugs and herbal products) and share it with your doctor and pharmacist. Do not start, stop, or change the dosage of any medicines without your doctor's approval. Warfarin interacts with many prescription, nonprescription, vitamin, and herbal products. This includes medications that are applied to the skin or inside the vagina or rectum. The interactions with warfarin usually result in an increase or decrease in the \"blood-thinning\" (anticoagulant) effect. Your doctor or other health care professional should closely monitor you to prevent serious bleeding or clotting problems. While taking warfarin, it is very important to tell your doctor or pharmacist of any changes in medications, vitamins, or herbal products that you are taking. " Some products that may interact with this drug include: capecitabine, imatinib, mifepristone. Aspirin, aspirin-like drugs (salicylates), and nonsteroidal anti-inflammatory drugs (NSAIDs such as ibuprofen, naproxen, celecoxib) may have effects similar to warfarin. These drugs may increase the risk of bleeding problems if taken during treatment with warfarin. Carefully check all prescription/nonprescription product labels (including drugs applied to the skin such as pain-relieving creams) since the products may contain NSAIDs or salicylates. Talk to your doctor about using a different medication (such as acetaminophen) to treat pain/fever. Low-dose aspirin and related drugs (such as clopidogrel, ticlopidine) should be continued if prescribed by your doctor for specific medical reasons such as heart attack or stroke prevention. Consult your doctor or pharmacist for more details. Many herbal products interact with warfarin. Tell your doctor before taking any herbal products, especially bromelains, coenzyme Q10, cranberry, danshen, dong quai, fenugreek, garlic, ginkgo biloba, ginseng, and Pearl's wort, among others. This medication may interfere with a certain laboratory test to measure theophylline levels, possibly causing false test results. Make sure laboratory personnel and all your doctors know you use this drug.      OVERDOSE:  If overdose is suspected, contact a poison control center or emergency room immediately. [place] residents can call the [place] National Poison Hotline at 1-995.417.5423. [place] residents can call a provincial poison control center. Symptoms of overdose may include: bloody/black/tarry stools, pink/dark urine, unusual/prolonged bleeding.      NOTES:  Do not share this medication with others. Laboratory and/or medical tests (such as INR, complete blood count) must be performed periodically to monitor your progress or check for side effects. Consult your doctor for more details.      MISSED DOSE:  For  the best possible benefit, do not miss any doses. If you do miss a dose and remember on the same day, take it as soon as you remember. If you remember on the next day, skip the missed dose and resume your usual dosing schedule. Do not double the dose to catch up because this could increase your risk for bleeding. Keep a record of missed doses to give to your doctor or pharmacist. Contact your doctor or pharmacist if you miss 2 or more doses in a row.      STORAGE:  Store at room temperature away from light and moisture. Do not store in the bathroom. Keep all medications away from children and pets. Do not flush medications down the toilet or pour them into a drain unless instructed to do so. Properly discard this product when it is  or no longer needed. Consult your pharmacist or local waste disposal company for more details about how to safely discard your product.      MEDICAL ALERT:  Your condition and medication can cause complications in a medical emergency. For information about enrolling in MedicAlert, call 1-958.141.2207 ([place]) or 1-501.239.3881 ([place]).      Information last revised 2010 Copyright(c) 2010 [employer]    Depression / Suicide Risk    As you are discharged from this [facility] facility, it is important to learn how to keep safe from harming yourself.    Recognize the warning signs:  · Abrupt changes in personality, positive or negative- including increase in energy   · Giving away possessions  · Change in eating patterns- significant weight changes-  positive or negative  · Change in sleeping patterns- unable to sleep or sleeping all the time   · Unwillingness or inability to communicate  · Depression  · Unusual sadness, discouragement and loneliness  · Talk of wanting to die  · Neglect of personal appearance   · Rebelliousness- reckless behavior  · Withdrawal from people/activities they love  · Confusion- inability to concentrate     If you or a loved one observes  any of these behaviors or has concerns about self-harm, here's what you can do:  · Talk about it- your feelings and reasons for harming yourself  · Remove any means that you might use to hurt yourself (examples: pills, rope, extension cords, firearm)  · Get professional help from the community (Mental Health, Substance Abuse, psychological counseling)  · Do not be alone:Call your Safe Contact- someone whom you trust who will be there for you.  · Call your local CRISIS HOTLINE 552-7025 or 056-295-2250  · Call your local Children's Mobile Crisis Response Team [place] (462) 737-0248 or www.eTipping  · Call the toll free National Suicide Prevention Hotlines   · National Suicide Prevention Lifeline 970-107-VNOF (2076)  · [facility] 800-SUICIDE (636-2032)  Incision and Drainage, Care After  Refer to this sheet in the next few weeks. These instructions provide you with information on caring for yourself after your procedure. Your caregiver may also give you more specific instructions. Your treatment has been planned according to current medical practices, but problems sometimes occur. Call your caregiver if you have any problems or questions after your procedure.  HOME CARE INSTRUCTIONS   · If antibiotic medicine is given, take it as directed. Finish it even if you start to feel better.  · Only take over-the-counter or prescription medicines for pain, discomfort, or fever as directed by your caregiver.  · Keep all follow-up appointments as directed by your caregiver.  · Change any bandages (dressings) as directed by your caregiver. Replace old dressings with clean dressings.  · Wash your hands before and after caring for your wound.  You will receive specific instructions for cleansing and caring for your wound.   SEEK MEDICAL CARE IF:   · You have increased pain, swelling, or redness around the wound.  · You have increased drainage, smell, or bleeding from the wound.  · You have muscle aches,  chills, or you feel generally sick.  · You have a fever.  MAKE SURE YOU:   · Understand these instructions.  · Will watch your condition.  · Will get help right away if you are not doing well or get worse.     This information is not intended to replace advice given to you by your health care provider. Make sure you discuss any questions you have with your health care provider.     Document Released: 03/11/2013 Document Revised: 01/08/2016 Document Reviewed: 03/11/2013  [employer] Interactive Interactive Patient Education ©2016 [employer]  Warfarin Coagulopathy  Introduction  Warfarin (Coumadin®) coagulopathy refers to bleeding that may occur as a complication of the medicine warfarin. Warfarin is an oral blood thinner (anticoagulant). Warfarin is used for medical conditions where thinning of the blood is needed to prevent blood clots.  What are the causes?  Bleeding is the most common and most serious complication of warfarin. The amount of bleeding is related to the warfarin dose and length of treatment. In addition, bleeding complications can also occur due to:  · Intentional or accidental warfarin overdose.  · Underlying medical conditions.  · Dietary changes.  · Medicine, herbal, supplement, or alcohol interactions.  What are the signs or symptoms?  Severe bleeding while on warfarin may occur from any tissue or organ. Symptoms of the blood being too thin may include:  · Bleeding from the nose or gums.  · Blood in bowel movements which may appear as bright red, dark, or black tarry stools.  · Blood in the urine which may appear as pink, red, or brown urine.  · Unusual bruising or bruising easily.  · A cut that does not stop bleeding within 10 minutes.  · Vomiting blood or continuous nausea for more than 1 day.  · Coughing up blood.  · Broken blood vessels in your eye (subconjunctival hemorrhage).  · Abdominal or back pain with or without flank bruising.  · Sudden, severe headache.  · Sudden weakness or numbness of the  face, arm, or leg, especially on one side of the body.  · Sudden confusion.  · Trouble speaking (aphasia) or understanding.  · Sudden trouble seeing in one or both eyes.  · Sudden trouble walking.  · Dizziness.  · Loss of balance or coordination.  · Vaginal bleeding.  · Swelling or pain at an injection site.  · Superficial fat tissue death (necrosis) which may cause skin scarring. This is more common in women and may first present as pain in the waist, thighs, or buttocks.  Follow these instructions at home:  · Always contact your health care provider of any concerns or signs of possible warfarin coagulopathy as soon as possible.  · Take warfarin exactly as directed by your health care provider. It is recommended that you take your warfarin dose at the same time of the day. If you have been told to stop taking warfarin, do not resume taking warfarin until directed to do so by your health care provider. Follow your health care provider's instructions if you accidentally take an extra dose or miss a dose of warfarin. It is very important to take warfarin as directed since bleeding or blood clots could result in chronic or permanent injury, pain, or disability.  · Keep all follow-up appointments with your health care provider as directed. It is very important to keep your appointments. Not keeping appointments could result in a chronic or permanent injury, pain, or disability because warfarin is a medicine that requires close monitoring.  · While taking warfarin, you will need to have regular blood tests to measure your blood clotting time. These blood tests usually include both the prothrombin time (PT) and International Normalized Ratio (INR) tests. The PT and INR results allow your health care provider to adjust your dose of warfarin. The dose can change for many reasons. It is critically important that you have your PT and INR levels drawn exactly as directed. Your warfarin dose may stay the same or change  depending on what the PT and INR results are. Be sure to follow up with your health care provider regarding your PT and INR test results and what your warfarin dosage should be.  · Many medicines can interfere with warfarin and affect the PT and INR results. You must tell your health care provider about any and all medicines you take. This includes all vitamins and supplements. Ask your health care provider before taking these. Prescription and over-the-counter medicine consistency is critical to warfarin management. It is important that potential interactions are checked before you start a new medicine. Be especially cautious with aspirin and anti-inflammatory medicines. Ask your health care provider before taking these. Medicines such as antibiotics and acid-reducing medicine can interact with warfarin and can cause an increased warfarin effect. Warfarin can also interfere with the effectiveness of medicines you are taking. Do not take or discontinue any prescribed or over-the-counter medicine except on the advice of your health care provider or pharmacist.  · Some vitamins, supplements, and herbal products interfere with the effectiveness of warfarin. Vitamin E may increase the anticoagulant effects of warfarin. Vitamin K can cause warfarin to be less effective. Do not take or discontinue any vitamin, supplement, or herbal product except on the advice of your health care provider or pharmacist.  · Eat what you normally eat and keep the vitamin K content of your diet consistent. Avoid major changes in your diet, or notify your health care provider before changing your diet. Suddenly getting a lot more vitamin K could cause your blood to clot too quickly. A sudden decrease in vitamin K intake could cause your blood to clot too slowly. These changes in vitamin K intake could lead to dangerous blood clots or to bleeding. To keep your vitamin K intake consistent, you must be aware of which foods contain moderate or  high amounts of vitamin K. Some foods that are high in vitamin K include spinach, kale, broccoli, cabbage, greens, [place] sprouts, asparagus, bok curtis, coleslaw, and parsley. If you drink green tea, drink the same amount each day. Arrange a visit with a dietitian to answer your questions.  · If you have a loss of appetite or get the stomach flu (viral gastroenteritis), talk to your health care provider as soon as possible. A decrease in your normal vitamin K intake can make you more sensitive to your usual dose of warfarin.  · Some medical conditions may increase your risk for bleeding while you are taking warfarin. A fever, diarrhea lasting more than a day, worsening heart failure, or worsening liver function are some medical conditions that could affect warfarin. Contact your health care provider if you have any of these medical conditions.  · Be careful not to cut yourself when using sharp objects or while shaving.  · Alcohol can change the body's ability to handle warfarin. It is best to avoid alcoholic drinks or consume only very small amounts while taking warfarin. Notify your health care provider if you change your alcohol intake. A sudden increase in alcohol use can increase your risk of bleeding. Chronic alcohol use can cause warfarin to be less effective.  · Limit physical activities or sports that could result in a fall or cause injury.  · Do not use warfarin if you are pregnant.  · Inform all your health care providers and your dentist that you take warfarin.  · Inform all health care providers if you are taking warfarin and aspirin or platelet inhibitor medicines such as clopidogrel, ticagrelor, or prasugrel. Use of these medicines in addition to warfarin can increase your risk of bleeding or death. Taking these medicines together should only be done under the direct care of your health care providers.  Get help right away if:  · You cough up blood.  · You have dark or black stools or there is bright  red blood coming from your rectum.  · You vomit blood or have nausea for more than 1 day.  · You have blood in the urine or pink-colored urine.  · You have unusual bruising or have increased bruising.  · You have bleeding from the nose or gums that does not stop quickly.  · You have a cut that does not stop bleeding within 2-3 minutes.  · You have sudden weakness or numbness of the face, arm, or leg, especially on one side of the body.  · You have sudden confusion.  · You have trouble speaking (aphasia) or understanding.  · You have sudden trouble seeing in one or both eyes.  · You have sudden trouble walking.  · You have dizziness.  · You have a loss of balance or coordination.  · You have a sudden, severe headache.  · You have a serious fall or head injury, even if you are not bleeding.  · You have swelling or pain at an injection site.  · You have unexplained tenderness or pain in the abdomen, back, waist, thighs, or buttocks.  Any of these symptoms may represent a serious problem that is an emergency. Do not wait to see if the symptoms will go away. Get medical help right away. Call your local emergency services (911 in [place] Do not drive yourself to the hospital.   This information is not intended to replace advice given to you by your health care provider. Make sure you discuss any questions you have with your health care provider.  Document Released: 11/26/2007 Document Revised: 05/25/2017 Document Reviewed: 05/28/2013  © 2017 [employer]      Atrial Fibrillation  Introduction  Atrial fibrillation is a type of heartbeat that is irregular or fast (rapid). If you have this condition, your heart keeps quivering in a weird (chaotic) way. This condition can make it so your heart cannot pump blood normally. Having this condition gives a person more risk for stroke, heart failure, and other heart problems. There are different types of atrial fibrillation. Talk with your doctor to learn about the type that you  have.  Follow these instructions at home:  · Take over-the-counter and prescription medicines only as told by your doctor.  · If your doctor prescribed a blood-thinning medicine, take it exactly as told. Taking too much of it can cause bleeding. If you do not take enough of it, you will not have the protection that you need against stroke and other problems.  · Do not use any tobacco products. These include cigarettes, chewing tobacco, and e-cigarettes. If you need help quitting, ask your doctor.  · If you have apnea (obstructive sleep apnea), manage it as told by your doctor.  · Do not drink alcohol.  · Do not drink beverages that have caffeine. These include coffee, soda, and tea.  · Maintain a healthy weight. Do not use diet pills unless your doctor says they are safe for you. Diet pills may make heart problems worse.  · Follow diet instructions as told by your doctor.  · Exercise regularly as told by your doctor.  · Keep all follow-up visits as told by your doctor. This is important.  Contact a doctor if:  · You notice a change in the speed, rhythm, or strength of your heartbeat.  · You are taking a blood-thinning medicine and you notice more bruising.  · You get tired more easily when you move or exercise.  Get help right away if:  · You have pain in your chest or your belly (abdomen).  · You have sweating or weakness.  · You feel sick to your stomach (nauseous).  · You notice blood in your throw up (vomit), poop (stool), or pee (urine).  · You are short of breath.  · You suddenly have swollen feet and ankles.  · You feel dizzy.  · Your suddenly get weak or numb in your face, arms, or legs, especially if it happens on one side of your body.  · You have trouble talking, trouble understanding, or both.  · Your face or your eyelid droops on one side.  These symptoms may be an emergency. Do not wait to see if the symptoms will go away. Get medical help right away. Call your local emergency services (911 in the  [place] Do not drive yourself to the hospital.   This information is not intended to replace advice given to you by your health care provider. Make sure you discuss any questions you have with your health care provider.  Document Released: 09/26/2009 Document Revised: 05/25/2017 Document Reviewed: 04/13/2016  © 2017 [employer]  Amiodarone tablets  What is this medicine?  AMIODARONE (a ORIANA oh da robb) is an antiarrhythmic drug. It helps make your heart beat regularly. Because of the side effects caused by this medicine, it is only used when other medicines have not worked. It is usually used for heartbeat problems that may be life threatening.  This medicine may be used for other purposes; ask your health care provider or pharmacist if you have questions.  COMMON BRAND NAME(S): Cordarone, Pacerone  What should I tell my health care provider before I take this medicine?  They need to know if you have any of these conditions:  -liver disease  -lung disease  -other heart problems  -thyroid disease  -an unusual or allergic reaction to amiodarone, iodine, other medicines, foods, dyes, or preservatives  -pregnant or trying to get pregnant  -breast-feeding  How should I use this medicine?  Take this medicine by mouth with a glass of water. Follow the directions on the prescription label. You can take this medicine with or without food. However, you should always take it the same way each time. Take your doses at regular intervals. Do not take your medicine more often than directed. Do not stop taking except on the advice of your doctor or health care professional.  A special MedGuide will be given to you by the pharmacist with each prescription and refill. Be sure to read this information carefully each time.  Talk to your pediatrician regarding the use of this medicine in children. Special care may be needed.  Overdosage: If you think you have taken too much of this medicine contact a poison control center or emergency room  at once.  NOTE: This medicine is only for you. Do not share this medicine with others.  What if I miss a dose?  If you miss a dose, take it as soon as you can. If it is almost time for your next dose, take only that dose. Do not take double or extra doses.  What may interact with this medicine?  Do not take this medicine with any of the following medications:  -abarelix  -apomorphine  -arsenic trioxide  -certain antibiotics like erythromycin, gemifloxacin, levofloxacin, pentamidine  -certain medicines for depression like amoxapine, tricyclic antidepressants  -certain medicines for fungal infections like fluconazole, itraconazole, ketoconazole, posaconazole, voriconazole  -certain medicines for irregular heart beat like disopyramide, dofetilide, dronedarone, ibutilide, propafenone, sotalol  -certain medicines for malaria like chloroquine, halofantrine  -cisapride  -droperidol  -haloperidol  -hawthorn  -maprotiline  -methadone  -phenothiazines like chlorpromazine, mesoridazine, thioridazine  -pimozide  -ranolazine  -red yeast rice  -vardenafil  -ziprasidone  This medicine may also interact with the following medications:  -antiviral medicines for HIV or AIDS  -certain medicines for blood pressure, heart disease, irregular heart beat  -certain medicines for cholesterol like atorvastatin, cerivastatin, lovastatin, simvastatin  -certain medicines for hepatitis C like sofosbuvir and ledipasvir; sofosbuvir  -certain medicines for seizures like phenytoin  -certain medicines for thyroid problems  -certain medicines that treat or prevent blood clots like warfarin  -cholestyramine  -cimetidine  -clopidogrel  -cyclosporine  -dextromethorphan  -diuretics  -fentanyl  -general anesthetics  -grapefruit juice  -lidocaine  -loratadine  -methotrexate  -other medicines that prolong the QT interval (cause an abnormal heart rhythm)  -procainamide  -quinidine  -rifabutin, rifampin, or rifapentine  -Honor's Wort  -trazodone  This list  may not describe all possible interactions. Give your health care provider a list of all the medicines, herbs, non-prescription drugs, or dietary supplements you use. Also tell them if you smoke, drink alcohol, or use illegal drugs. Some items may interact with your medicine.  What should I watch for while using this medicine?  Your condition will be monitored closely when you first begin therapy. Often, this drug is first started in a hospital or other monitored health care setting. Once you are on maintenance therapy, visit your doctor or health care professional for regular checks on your progress. Because your condition and use of this medicine carry some risk, it is a good idea to carry an identification card, necklace or bracelet with details of your condition, medications, and doctor or health care professional.  You may get drowsy or dizzy. Do not drive, use machinery, or do anything that needs mental alertness until you know how this medicine affects you. Do not stand or sit up quickly, especially if you are an older patient. This reduces the risk of dizzy or fainting spells.  This medicine can make you more sensitive to the sun. Keep out of the sun. If you cannot avoid being in the sun, wear protective clothing and use sunscreen. Do not use sun lamps or tanning beds/booths.  You should have regular eye exams before and during treatment. Call your doctor if you have blurred vision, see halos, or your eyes become sensitive to light. Your eyes may get dry. It may be helpful to use a lubricating eye solution or artificial tears solution.  If you are going to have surgery or a procedure that requires contrast dyes, tell your doctor or health care professional that you are taking this medicine.  What side effects may I notice from receiving this medicine?  Side effects that you should report to your doctor or health care professional as soon as possible:  -allergic reactions like skin rash, itching or hives,  swelling of the face, lips, or tongue  -blue-gray coloring of the skin  -blurred vision, seeing blue green halos, increased sensitivity of the eyes to light  -breathing problems  -chest pain  -dark urine  -fast, irregular heartbeat  -feeling faint or light-headed  -intolerance to heat or cold  -nausea or vomiting  -pain and swelling of the scrotum  -pain, tingling, numbness in feet, hands  -redness, blistering, peeling or loosening of the skin, including inside the mouth  -spitting up blood  -stomach pain  -sweating  -unusual or uncontrolled movements of body  -unusually weak or tired  -weight gain or loss  -yellowing of the eyes or skin  Side effects that usually do not require medical attention (report to your doctor or health care professional if they continue or are bothersome):  -change in sex drive or performance  -constipation  -dizziness  -headache  -loss of appetite  -trouble sleeping  This list may not describe all possible side effects. Call your doctor for medical advice about side effects. You may report side effects to FDA at 4-148-FDA-9376.  Where should I keep my medicine?  Keep out of the reach of children.  Store at room temperature between 20 and 25 degrees C (68 and 77 degrees F). Protect from light. Keep container tightly closed. Throw away any unused medicine after the expiration date.  NOTE: This sheet is a summary. It may not cover all possible information. If you have questions about this medicine, talk to your doctor, pharmacist, or health care provider.  © 2018 [employer] (2015-03-23 19:48:11)      Controlled Substance Use Informed Consent    Treatment with controlled substance medications including opiates (narcotics) is dangerous and can lead to physical dependence and addiction. Taking these may lead to serious health problems, accidental overdose, and even death.     Risks & Benefits  · Accidental overdose can and frequently occurs. Death is possible from respiratory  depression (slow breathing).  · Even taking these medications for a short duration have been demonstrated to cause physical dependence.  · There may be forms of medications available that deter certain types of abuse.  Ask your doctor for more information.  · Women who are pregnant, plan to become pregnant, or could become pregnant, have the added risk of fetal and  baby drug addiction, and  abstinence syndrome, a dangerous type of drug withdrawal.    Proper use  · Take your medications as prescribed. Do not take more pills or capsules than are prescribed, or more often than prescribed.  · Notify your provider of all other medications, supplements, and over-the-counter medications you are taking.  · Do not start new medications while on these medications without discussing with your healthcare provider.    Storage & Disposal  · These medications are for your use only.   · Do not share your medications with others and do not sell your medication.   · Ensure safe storage of your medications in their original containers and out of the reach of others.  · Take unused medications to a Drug Enforcement Administration public disposal location (https://apps.[employer].gov/pubdispsearch/)  · If disposal services are unavailable, mix medication with used coffee grounds or sotero litter, seal in a bag and place in the trash.    Refills  · New medication refills will only be authorized after being seen by your healthcare provider.   · No telephone or internet refills will be given.    Minors  · Those under 18 have special risks including interfering with brain development.   · Warning signs include mood or personality changes, behavior changes including doing poorly in school, social isolation, or a sudden change of friends. Please see https://drugfree.org/article/spotting-drug-use/ for more info.    Opioids and Narcotics  · Constipation is highly likely with opiate treatment, and can cause nausea,  vomiting, and can lead bowel blockage and death.  · Opiate medication overdose can be reversed with a medication called naloxone (Narcan). Some pharmacies in [place] allow you to purchase naloxone without a prescription, and the law in [place] allows for healthcare providers to prescribe naloxone to a patient’s family or other acquaintances.  · Opiate pain medications do not treat the underlying cause of your pain. They only mask your pain.  · Your pain level may be reduced while taking these medications, and the intent is to restore normal function and enable you to perform daily tasks. You may never reach a “zero” pain level, even with proper usage.    Alternatives  · Non-narcotic medications may be effective in treating your pain. These include NSAIDs (e.g. ibuprofen, Motrin) and acetaminophen (e.g. Tylenol, Excedrin). Please discuss with your healthcare provider to see if you may be a candidate for this type of pain control.  · Some people treat pain without medications. Heating pads and ice may be just as effective for pain.  · Other treatments could include physical therapy, massage, cognitive behavioral therapy, or acupuncture.  · Risks of the alternative were discussed with the patient.       This is not an exhaustive list of risks, benefits, alternatives, or instructions.  If you have any questions regarding your medications please talk with No name on file. or another member of your care team.    By signing below, I acknowledge my healthcare provider has discussed the risks and benefits of controlled substances, my specific treatment plan, and I agree to treatment with these medications. I understand I can withdraw my consent to treatment with these medications at any time.    _________________________________ _______________________   Patient / Authorized Legal Representative Date/Time      _________________________________     Relationship to patient (if not patient)

## 2020-06-23 NOTE — DISCHARGE SUMMARY
DISCHARGE SUMMARY    ADMISSION DATE: 6/17/2020    DISCHARGE DATE: 06/23/20    CHIEF COMPLAINT ON ADMISSION: Shortness of breath    ADMITTING DIAGNOSES: Severe symptomatic mitral regurgitation (4+,   degenerative), posterior mitral valve leaflet prolapse.    DISCHARGE DIAGNOSES: Severe symptomatic mitral regurgitation (4+,   degenerative), posterior mitral valve leaflet prolapse, post-operative atrial fibrillation    PROCEDURES PERFORMED: 6/17/2020 Radical mitral valve repair (P2 triangular resection, 34 mm Montoya flexible annuloplasty band), left atrial appendage excision/ligation and intraoperative transesophageal echocardiography.      HISTORY OF PRESENT ILLNESS:  The patient is a 75-year-old female with severe symptomatic mitral regurgitation.  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  There was P2 prolapse.  Her left ventricular ejection fraction was normal at approximately 65%. She was set-up for elective surgery.     HOSPITAL COURSE:   The patient was extubated, her chest tubes and hickman catheter were removed on post-op day 2. She progressed as expected. She was placed on diuretic therapy during his admission. At discharge her weight was 3 kg above her baseline weight.  She was put on Lasix 20 mg PO daily.  She had 3 days of intermittent atrial fibrillation that converted with IV amiodarone. She will be continued of PO amiodarone on discharge.  She was placed on coumaadin for anticoagulation for 3 months.  She was tolerating meals and ambulating hallways with no problems with FWW.  Pain is controlled on current therapy oxycodone 5mg.  She was discharged in stable condition on room air.   She was enrolled in home health to assist with lab draws for INR's.     RECENT LABS:     Lab Results   Component Value Date/Time    SODIUM 135 06/22/2020 07:27 AM    POTASSIUM 4.2 06/22/2020 07:27 AM    CHLORIDE 95 (L) 06/22/2020 07:27 AM    CO2 28 06/22/2020 07:27 AM    GLUCOSE  133 (H) 06/22/2020 07:27 AM    BUN 12 06/22/2020 07:27 AM    CREATININE 0.72 06/22/2020 07:27 AM      Lab Results   Component Value Date/Time    WBC 14.2 (H) 06/20/2020 02:30 AM    RBC 3.16 (L) 06/20/2020 02:30 AM    HEMOGLOBIN 9.6 (L) 06/20/2020 02:30 AM    HEMATOCRIT 29.3 (L) 06/20/2020 02:30 AM    MCV 92.7 06/20/2020 02:30 AM    MCH 30.4 06/20/2020 02:30 AM    MCHC 32.8 (L) 06/20/2020 02:30 AM    MPV 13.7 (H) 06/20/2020 02:30 AM    NEUTSPOLYS 73.00 (H) 06/16/2020 11:00 AM    LYMPHOCYTES 19.80 (L) 06/16/2020 11:00 AM    MONOCYTES 5.80 06/16/2020 11:00 AM    EOSINOPHILS 0.40 06/16/2020 11:00 AM    BASOPHILS 0.70 06/16/2020 11:00 AM      Lab Results   Component Value Date/Time    PROTHROMBTM 20.0 (H) 06/23/2020 02:24 AM    INR 1.65 (H) 06/23/2020 02:24 AM        ALLERGIES:     Keflex    DISCHARGE MEDICATIONS:      Medication List      START taking these medications      Instructions   amiodarone 400 MG tablet  Commonly known as:  PACERONE   Take 1 Tab by mouth 2 times a day. For 1 week and then 1 tablet daily  Dose:  400 mg     furosemide 20 MG Tabs  Commonly known as:  LASIX   Take 1 Tab by mouth every day.  Dose:  20 mg     metoprolol 25 MG Tabs  Commonly known as:  LOPRESSOR   Take 0.5 Tabs by mouth 2 Times a Day.  Dose:  12.5 mg     omeprazole 20 MG delayed-release capsule  Start taking on:  June 24, 2020  Commonly known as:  PRILOSEC   Take 1 Cap by mouth every day.  Dose:  20 mg     oxyCODONE immediate-release 5 MG Tabs  Commonly known as:  ROXICODONE   Take 1 Tab by mouth every 8 hours as needed for up to 10 days.  Dose:  5 mg     potassium chloride SA 10 MEQ Tbcr  Commonly known as:  K-DUR   Take 1 Tab by mouth every day.  Dose:  10 mEq     warfarin 5 MG Tabs  Commonly known as:  COUMADIN   Take 1 Tab by mouth every day at 6 PM.  Dose:  5 mg        CONTINUE taking these medications      Instructions   aspirin EC 81 MG Tbec  Commonly known as:  ECOTRIN   Take 81 mg by mouth 2 Times a Day.  Dose:  81 mg      ondansetron 4 MG Tbdp  Commonly known as:  ZOFRAN ODT   Take 4 mg by mouth every 6 hours as needed for Nausea.  Dose:  4 mg            NARCOTIC PAIN MEDICATIONS:   In prescribing controlled substances to this patient, I certify that I have obtained and reviewed their medical history. I have also made a good kathy effort to obtain applicable records from other providers who have treated the patient .    I have conducted a physical exam and documented it. I have reviewed the patient's prescription history as maintained by the [place] Prescription Monitoring Program.     I have assessed the patient’s risk for abuse, dependency, and addiction using the validated Opioid Risk Tool.    Given the above, I believe the benefits of controlled substance therapy outweigh the risks. The reasons for prescribing controlled substances include non-narcotic, oral analgesic alternatives have been inadequate for pain control. Accordingly, I have discussed the risk and benefits, treatment plan, and alternative therapies with the patient.     Pt understands this prescription is a controlled substance which is potentially habit-forming and its use is regulated by the [employer]. It must be submitted to the pharmacy within 5 days of the date written and can not be called in or faxed to the pharmacy. Refills are subject to terms of a medicine agreement. Any refill requires a new prescription that must be obtained from this office during regular office hours. We ask for 72 hours notice to get an appointment for a narcotic pain medication refill. This medicine can cause nausea, significant constipation, sedation, confusion.     DIET:   Cardiac diet    DISCHARGE INSTRUCTIONS DISCUSSED WITH THE PATIENT:      1. NO driving for 4 weeks after surgery. You may ride as a passenger.  2. NO lifting of any item over 10 lbs (e.g. gallon of milk) for 6 weeks after surgery.  3. DO walk as much as possible! Walk a minimum of once a day. Depending on your fatigue  and comfort level, you may walk as much as you wish. There is no maximum.  4. Other physical activities (sex, housework, gardening, etc.) are OK after 4 weeks   5. Continue using incentive spirometer for 2 weeks, especially if going home on oxygen.    Incision Care:  1. SHOWER ONLY - no baths. Clean incision daily with plain Ivory ® soap or any other dye or perfume free soap. Then pat incision dry with clean towel. Avoid creams or lotions on the incision(s).  a. If there is any increase in redness or swelling, or separation of the incision line, or thick drainage* from any of the incisions, call right away  * Clear, thin drainage is not abnormal especially from the leg incision and/or                         chest tube sites.  2. Continue to wear your MARTA Stockings for 4 weeks. You may take off the stockings when in bed or when the legs are elevated.    Patient instructed to call [facility] cardiac surgery at [phone]  if any increased shortness of breath, uncontrolled pain, weight gain greater than 2 pounds in 1 day, SBP >140, HR <60 or redness swelling or drainage of incisions.      FOLLOW-UP:   Future Appointments   Date Time Provider Department Center   7/15/2020 10:20 AM ANCELMO Sampson. [facility] None   7/27/2020  1:15 PM ANCELMO Colón. [facility] None

## 2020-06-23 NOTE — PROGRESS NOTES
Cardiovascular Surgery Progress Note    Name: Frances Sanchez  MRN: [id number]  : 1945  Admit Date: 2020  5:45 AM  Procedure:  Procedure(s) and Anesthesia Type:     * MITRAL VALVE REPAIR - General     * ECHOCARDIOGRAM, TRANSESOPHAGEAL - General  6 Day Post-Op    Vitals:  Patient Vitals for the past 8 hrs:   Temp SpO2 O2 Delivery Device O2 (LPM) Pulse Resp BP Weight   20 0454 37 °C (98.6 °F) 92 % None - Room Air 0 85 16 143/66 63.2 kg (139 lb 5.3 oz)     Temp (24hrs), Av.1 °C (98.7 °F), Min:36.6 °C (97.8 °F), Max:37.3 °C (99.2 °F)      Respiratory:    Respiration: 16, Pulse Oximetry: 92 %     Chest Tube Drains:          Fluids:    Intake/Output Summary (Last 24 hours) at 2020 0853  Last data filed at 2020 0647  Gross per 24 hour   Intake 1200 ml   Output 3575 ml   Net -2375 ml     Admit weight: Weight: 60 kg (132 lb 4.4 oz)  Current weight: Weight: 63.2 kg (139 lb 5.3 oz) (20 0454)    Labs:          Recent Labs     205 20  0727   SODIUM  --  132* 135   POTASSIUM 4.1 4.1 4.2   CHLORIDE  --  99 95*   CO2  --  26 28   GLUCOSE  --  128* 133*   BUN  --  13 12   CREATININE  --  0.63 0.72   CALCIUM  --  8.0* 8.5     Recent Labs     20  0727 20  0224   INR 1.07 1.25* 1.65*       Medications:  • metoprolol  12.5 mg     • warfarin  7.5 mg     • sertraline  50 mg     • amiodarone  400 mg     • furosemide  20 mg     • omeprazole  20 mg     • senna-docusate  2 Tab      And   • polyethylene glycol/lytes  1 Packet      And   • magnesium hydroxide  30 mL     • MD Alert...Warfarin per Pharmacy       • aspirin EC  81 mg     • Pharmacy Consult Request  1 Each          Ordered Medications:    ASA - Yes    Plavix - No; contraindicated because of On Coumadin / NOAC    Post-operative Beta Blockers - Yes    Ace Inhibitor - No; contraindicated because of Normal EF    Statin - No; contraindicated because of No CAD    Exam:   Review of Systems    Constitutional: Positive for malaise/fatigue.   HENT: Negative.    Eyes: Negative.    Respiratory: Negative.    Cardiovascular: Negative.    Gastrointestinal: Negative.    Genitourinary: Negative.    Musculoskeletal: Negative.    Skin: Negative.    Neurological: Negative.    Endo/Heme/Allergies: Negative.    Psychiatric/Behavioral: Negative.        Physical Exam  Vitals signs and nursing note reviewed.   Constitutional:       Appearance: Normal appearance.   HENT:      Head: Normocephalic and atraumatic.      Nose: Nose normal.      Mouth/Throat:      Mouth: Mucous membranes are moist.      Pharynx: Oropharynx is clear.   Eyes:      Extraocular Movements: Extraocular movements intact.      Conjunctiva/sclera: Conjunctivae normal.      Pupils: Pupils are equal, round, and reactive to light.   Neck:      Musculoskeletal: Normal range of motion and neck supple.   Cardiovascular:      Rate and Rhythm: Normal rate and regular rhythm.      Pulses: Normal pulses.   Pulmonary:      Effort: Pulmonary effort is normal. No respiratory distress.   Abdominal:      General: Abdomen is flat. Bowel sounds are normal.      Palpations: Abdomen is soft.   Musculoskeletal: Normal range of motion.      Right lower leg: No edema.      Left lower leg: No edema.      Comments: Trace bilateral lower extremity edema   Skin:     General: Skin is warm and dry.      Coloration: Skin is not jaundiced.      Findings: No rash.      Comments: Sternum incision C/D/I   Neurological:      General: No focal deficit present.      Mental Status: She is alert and oriented to person, place, and time. Mental status is at baseline.   Psychiatric:         Mood and Affect: Mood and affect normal.         Speech: Speech normal.         Behavior: Behavior normal.         Thought Content: Thought content normal.       Quality Measures:   Quality-Core Measures   Reviewed items::  EKG reviewed, Labs reviewed, Medications reviewed and Radiology images  reviewed    Assessment/Plan:  POD #1:  Off all gtts.  Neuro intact.  Pain controlled.  High anxiety.  Moderate chest tube output overnight, improved this morning.  Fluid/weight up. Nauseated. Plan:  Begin gentle diuresis.  Started Ativan PRN.  Given Zofran.  Plan to remove chest tubes at 1300 if <100 out since 0600. Hickman out at same time.  Start omeprazole.  Up and out of bed as tolerated.    POD 2  HDS, afib, neuro intact, wounds CDI, abdomen S/NT, fluid balance positive, wt up.  Chest tube serous.  Plan:  Dc chest tubes and hickman, continue to diurese. IS/ambulate. CPM.  POD 3  HDS, SR, neuro intact, wounds CDI, abdomen S/NT, fluid balance trending down, wt at baseline, INR subtherapeutic.  Plan:Transfer to tele.  DC pacing wires.  Continue to diurese.    POD 4  HDS, SR was afib during night and switched bundles, now NSR, neuro intact, wounds CDI, abdomen S/NT, fluid balance euvolemic, wt stable.  INR subtherapeutic.  Plan:  DC IV amiodarone, continue to diurese.  BB, IS/ambulate. CPM.  POD 5:  HDS.  Afib overnight.  Diuresing well, weight up.  INR remains subtherapeutic. Patient anxious.  Plan: Begin oral bblockers.  Continue diuresis.  Continue oral Amio.  Encourage IS/ambulate.  Discussed importance of taking all medications as one dose of Coumadin was refused.  Home health orders placed.  Plan to discharge home tomorrow with .  POD 6 HDS, SR- on po amio, Room air, ambulating with FWW and minimal assistance, diuresed well- 3 kg up, home today with home health if able if not will do outpatient lab draws for INR.     Active Hospital Problems    Diagnosis   • Severe mitral regurgitation [I34.0]     Priority: High

## 2020-06-23 NOTE — PROGRESS NOTES
Pt being discharged. Pt educated on post open heart education, warfarin and INR checks, amioderone and received instructions. New prescriptions given and pt verbalized understanding of all medications. Follow up appt made with cardiology and cardiothoracic surguren. PIV removed. Monitor checked in, monitor room notified. All personal belongings with pt. Pt going home with . RN to call transport for escort out. Will monitor pt until off unit.

## 2020-06-23 NOTE — PROGRESS NOTES
Patient care assumed, bedside report received, POC discussed, verbalizes understanding. Safety measures in place, call light in place. Pt is A&0x4 and resting comfortably in the recliner.  at bedside. Pt is complaining of nausea. Will medicate per mar

## 2020-06-23 NOTE — CARE PLAN
Problem: Communication  Goal: The ability to communicate needs accurately and effectively will improve  Outcome: PROGRESSING AS EXPECTED     Problem: Venous Thromboembolism (VTW)/Deep Vein Thrombosis (DVT) Prevention:  Goal: Patient will participate in Venous Thrombosis (VTE)/Deep Vein Thrombosis (DVT)Prevention Measures  Outcome: PROGRESSING AS EXPECTED     Problem: Knowledge Deficit  Goal: Knowledge of disease process/condition, treatment plan, diagnostic tests, and medications will improve  Outcome: PROGRESSING AS EXPECTED     Problem: Post Op Day 4 CABG/Heart Valve Replacement  Goal: Optimal care of the Post Op CABG/Heart Valve replacement Post Op Day 4  Outcome: PROGRESSING AS EXPECTED     Problem: Psychosocial needs  Goal: Spiritual needs incorporated in hospitalization  Outcome: PROGRESSING AS EXPECTED  Goal: Anxiety reduction  Outcome: PROGRESSING AS EXPECTED

## 2020-06-23 NOTE — PROGRESS NOTES
Received report from Son, at pt bedside. Pt A/o x 4, pt is POD 6, waiting on HH acceptance prior to D/C, pt INR is 1.65 will update team during rounds, pt was SR overnight will continue to monitor, POC discussed. Call light and belongings within reach. Bed locked and in low position. Alarm on and fall precautions in place.

## 2020-06-24 ENCOUNTER — TELEPHONE (OUTPATIENT)
Dept: VASCULAR LAB | Facility: MEDICAL CENTER | Age: 75
End: 2020-06-24

## 2020-06-24 DIAGNOSIS — I48.0 PAROXYSMAL ATRIAL FIBRILLATION (HCC): ICD-10-CM

## 2020-06-24 LAB — INR PPP: 2.6 (ref 2–3.5)

## 2020-06-24 NOTE — TELEPHONE ENCOUNTER
Received anticoagulation referral for warfarin due to MVR and Afib from ARISTEO Dunbar on 06/18/20.    Per DC notes -   Shruti  to draw INR on 6/24/20.     Faxed standing INR order to Shruti  for draw today. [phone]        Insurance: Medicare  PCP: [facility]  Locations to be seen: Austin murcia DC'ed from ?    [facility] at [phone], fax [phone]    Galina Cardenas, PharmD

## 2020-06-25 ENCOUNTER — TELEPHONE (OUTPATIENT)
Dept: VASCULAR LAB | Facility: MEDICAL CENTER | Age: 75
End: 2020-06-25

## 2020-06-25 ENCOUNTER — ANTICOAGULATION MONITORING (OUTPATIENT)
Dept: VASCULAR LAB | Facility: MEDICAL CENTER | Age: 75
End: 2020-06-25

## 2020-06-25 DIAGNOSIS — Z98.890 S/P MITRAL VALVE REPAIR: ICD-10-CM

## 2020-06-25 DIAGNOSIS — I48.0 PAROXYSMAL ATRIAL FIBRILLATION (HCC): ICD-10-CM

## 2020-06-25 NOTE — PROGRESS NOTES
OP   Telephone Anticoagulation Service Note      Anticoagulation Summary  As of 2020    INR goal:   2.0-3.0   TTR:   --   INR used for dosin.60 (2020)   Warfarin maintenance plan:   5 mg (5 mg x 1) every day   Weekly warfarin total:   35 mg   Plan last modified:   Felicita Leigh, Rochelle (2020)   Next INR check:   2020   Target end date:       Indications    Atrial fibrillation (HCC) [I48.91]  S/P mitral valve repair [Z98.890]             Anticoagulation Episode Summary     INR check location:       Preferred lab:       Send INR reminders to:       Comments:   Shruti DOVE [phone]      Anticoagulation Care Providers     Provider Role Specialty Phone number    PARK Baez Referring Family Medicine [phone]    Margy Dunbar P.A.-C. Referring Cardiology [phone]    [facility] Responsible  [phone]        Anticoagulation Patient Findings  Patient Findings     Negatives:   Signs/symptoms of thrombosis, Signs/symptoms of bleeding, Laboratory test error suspected, Change in health, Change in alcohol use, Change in activity, Upcoming invasive procedure, Emergency department visit, Upcoming dental procedure, Missed doses, Extra doses, Change in medications, Change in diet/appetite, Hospital admission, Bruising, Other complaints         Pt is new to [facility] and new to warfarin. Patient has PMH of AF and severe symptomatic mitral valve regurgitation and underwent mitral valve repair. She was discharged on warfarin and referred to our clinic via Margy [email or web address] Patient currently has Shruti DOVE.     Discussed indication for warfarin therapy and INR goal range. Explained our services, hours of operation, warfarin therapy, potential SE, potential DI. Discussed diet at length, with an emphasis on foods rich in vitamin K.  Discussed monitoring parameters, such as blood in urine, blood in stool, discussed what to do if a dose is missed, or suspected as missed.   Emphasized importance of compliance and consistency including follow up. Discussed lifestyle choices of ETOH & smoking and its impact on therapy.    Chads-Vasc= 2 (age, female)      HAS-BLED=2 (age, ASA)    Antiplatelet therapy=? YES- ASA 81mg     Can pt be transitioned to DOAC? NO    A/P:  INR is therapeutic at 2.6.   Patient denies any interval changes to diet and/or medications. Patient denies any signs/symptoms of bleeding or clotting.  Patient will continue with 5mg and will recheck INR again tomorrow.     Anastasiya Castro  PharmD

## 2020-06-26 ENCOUNTER — ANTICOAGULATION MONITORING (OUTPATIENT)
Dept: MEDICAL GROUP | Facility: PHYSICIAN GROUP | Age: 75
End: 2020-06-26

## 2020-06-26 DIAGNOSIS — Z98.890 S/P MITRAL VALVE REPAIR: ICD-10-CM

## 2020-06-26 LAB — INR PPP: 3.6 (ref 2–3.5)

## 2020-06-26 NOTE — PROGRESS NOTES
Anticoagulation Summary  As of 6/26/2020    INR goal:   2.0-3.0   TTR:   --   INR used for dosing:   3.60! (6/26/2020)   Warfarin maintenance plan:   5 mg (5 mg x 1) every day   Weekly warfarin total:   35 mg   Plan last modified:   Felicita Leigh, Rochelle (6/26/2020)   Next INR check:   6/30/2020   Target end date:       Indications    Atrial fibrillation (HCC) [I48.91]  S/P mitral valve repair [Z98.890]             Anticoagulation Episode Summary     INR check location:       Preferred lab:       Send INR reminders to:       Comments:   Shruti DOVE [phone]      Anticoagulation Care Providers     Provider Role Specialty Phone number    PARK Baez Referring Family Medicine [phone]    Margy Dunbar P.A.-C. Referring Cardiology [phone]    [facility] Responsible  [phone]        Anticoagulation Patient Findings      Spoke with pt.  INR is supratherapeutic d/t bolus doses given in the hospital (updated calendar to reflect what dosing was given during admission).   Pt denies any unusual s/s of bleeding, bruising, clotting or any changes to diet or medications. Denies any etoh, cranberries, supplements, or illness.   Pt verifies warfarin weekly dosing.     Will have pt hold x 1 day, then continue with 5 mg daily    Repeat INR in 4 days via Shruti DOVE.     Felicita Leigh, AkbarD

## 2020-06-26 NOTE — TELEPHONE ENCOUNTER
Initial anticoagulation clinic note and discharge summary and most recent cardiology consult reviewed  Patient appears to have a history of paroxysmal atrial fibrillation.  Now she has undergone mitral valve repair    Pending further recommendations, we will continue with indefinite anticoagulation with warfarin plus low-dose aspirin as recommended at discharge    We will defer all further cv care, aside from day to day management of anticoagulation to cardiology.     Michael Bloch, MD  Anticoagulation Clinic    Cc:  SUSIE Mobley

## 2020-06-29 ENCOUNTER — TELEPHONE (OUTPATIENT)
Dept: CARDIOTHORACIC SURGERY | Facility: MEDICAL CENTER | Age: 75
End: 2020-06-29

## 2020-06-29 NOTE — TELEPHONE ENCOUNTER
Received voicemail from patient with question regarding a small amount of oozing from her sternal incision.  I called her back and left a voicemail.

## 2020-06-29 NOTE — PROGRESS NOTES
"Received patient's voicemail regarding the sternal incision oozing.  She states she \"is fine right now\" and also stated the home health RN would be coming tomorrow and she would have the RN look at the incision as well.  No need to f/u with another call at this time.  "

## 2020-06-30 ENCOUNTER — TELEPHONE (OUTPATIENT)
Dept: CARDIOTHORACIC SURGERY | Facility: MEDICAL CENTER | Age: 75
End: 2020-06-30

## 2020-06-30 NOTE — TELEPHONE ENCOUNTER
"Frances states her sternal incision is slightly oozing from the bottom. She states it is somewhat hard and both home health RN's states it is glue. Patient states it is hard consistency like \"tree sap\" and very little.    [facility]  No s/s of infection that I can see although the picture is slightly grainy.    Call time 8 minutes.  "

## 2020-07-01 ENCOUNTER — TELEPHONE (OUTPATIENT)
Dept: VASCULAR LAB | Facility: MEDICAL CENTER | Age: 75
End: 2020-07-01

## 2020-07-01 NOTE — TELEPHONE ENCOUNTER
Spoke with Zenobia.     Pt had refused venipuncture on 6/30/20 when POCT wasn't working. Zenobia will go out tomorrow for POCT INR.     Galina Cardenas, PharmD

## 2020-07-01 NOTE — TELEPHONE ENCOUNTER
----- Message from Hiral Givens sent at 7/1/2020  3:33 PM PDT -----  Regarding: FW: Zenobia @ [facility] - she was unable to obtain an INR today.  Zenobia @ [facility] requesting a call back, she would like to know if she should go out to the patients home again tomorrow to take the INR.    ----- Message -----  From: Hiral Givens  Sent: 6/30/2020   3:16 PM PDT  To: Amb Anticoag Pool  Subject: Zenobia @ [facility] - she was u#    Zenobia @ [facility] - she was unable to obtain an INR today.    Zenobia can be reached at Ph:[phone]

## 2020-07-02 ENCOUNTER — ANTICOAGULATION MONITORING (OUTPATIENT)
Dept: VASCULAR LAB | Facility: MEDICAL CENTER | Age: 75
End: 2020-07-02

## 2020-07-02 ENCOUNTER — TELEMEDICINE (OUTPATIENT)
Dept: MEDICAL GROUP | Facility: PHYSICIAN GROUP | Age: 75
End: 2020-07-02
Payer: MEDICARE

## 2020-07-02 ENCOUNTER — ANTICOAGULATION MONITORING (OUTPATIENT)
Dept: MEDICAL GROUP | Facility: MEDICAL CENTER | Age: 75
End: 2020-07-02

## 2020-07-02 VITALS
HEIGHT: 67 IN | BODY MASS INDEX: 19.62 KG/M2 | TEMPERATURE: 98.7 F | SYSTOLIC BLOOD PRESSURE: 100 MMHG | WEIGHT: 125 LBS | DIASTOLIC BLOOD PRESSURE: 75 MMHG

## 2020-07-02 DIAGNOSIS — Z09 HOSPITAL DISCHARGE FOLLOW-UP: ICD-10-CM

## 2020-07-02 DIAGNOSIS — F41.9 ANXIETY: ICD-10-CM

## 2020-07-02 DIAGNOSIS — I48.91 ATRIAL FIBRILLATION, UNSPECIFIED TYPE (HCC): ICD-10-CM

## 2020-07-02 DIAGNOSIS — Z98.890 S/P MITRAL VALVE REPAIR: ICD-10-CM

## 2020-07-02 LAB — INR PPP: 1.9 (ref 2–3.5)

## 2020-07-02 PROCEDURE — 99214 OFFICE O/P EST MOD 30 MIN: CPT | Mod: 95,CR | Performed by: NURSE PRACTITIONER

## 2020-07-02 RX ORDER — MULTIVIT,TX WITH IRON,MINERALS
1 TABLET, EXTENDED RELEASE ORAL 2 TIMES DAILY
Qty: 180 TAB | Refills: 1 | Status: SHIPPED | OUTPATIENT
Start: 2020-07-02 | End: 2020-10-27

## 2020-07-02 RX ORDER — SERTRALINE HYDROCHLORIDE 25 MG/1
25 TABLET, FILM COATED ORAL DAILY
Qty: 60 TAB | Refills: 1 | Status: SHIPPED | OUTPATIENT
Start: 2020-07-02 | End: 2020-07-15

## 2020-07-02 ASSESSMENT — FIBROSIS 4 INDEX: FIB4 SCORE: 4.06

## 2020-07-02 NOTE — PROGRESS NOTES
OP Telephone Anticoagulation Service Note    Date: 2020      Anticoagulation Summary  As of 2020    INR goal:   2.0-3.0   TTR:   --   INR used for dosin.90! (2020)   Warfarin maintenance plan:   5 mg (5 mg x 1) every day   Weekly warfarin total:   35 mg   Plan last modified:   Felicita Leigh PharmD (2020)   Next INR check:   2020   Target end date:       Indications    Atrial fibrillation (HCC) [I48.91]  S/P mitral valve repair [Z98.890]             Anticoagulation Episode Summary     INR check location:       Preferred lab:       Send INR reminders to:       Comments:   Shruti  [phone]      Anticoagulation Care Providers     Provider Role Specialty Phone number    PARK Baez Referring Family Medicine [phone]    Margy Dunbar P.A.-C. Referring Cardiology [phone]    [facility] Responsible  [phone]        Anticoagulation Patient Findings      Plan: Spoke with patient on the phone. Patient is Sub therapeutic today. Pt has been taking 2.5 mg daily. Patient denies any changes in medications or diet. Patient denies any signs or symptoms of bleeding or clotting. Instructed patient to call clinic if any unusual bleeding or bruising occurs. Will have pt take 2.5 mg alternating with 5 mg until next INR. Will follow-up with patient in 5 day(s).        Julissa Fox, PharmD

## 2020-07-02 NOTE — PROGRESS NOTES
"Telemedicine Visit: Established Patient     This encounter was conducted via Zoom .   Verbal consent was obtained. Patient's identity was verified.    Subjective:     Chief Complaint   Patient presents with   • Follow-Up     Hospital follow up   • Medication Problem     Wants Zoloft      Frances Sanchez is a 75 y.o. female presenting for evaluation and management of following problems:    Hospital discharge follow-up  S/p scheduled MVR on 6/17/20. PT states she is doing well, pain is well controlled. She has great support systems in . HH is following at home. She has a fu appt with cardiology in a few weeks. She is requesting to reinitiate Sertraline as she was given this in the hospital. She was on a 50 mg dose and it worked well. Off of Sertraline x 2 weeks.       ROS   Denies any recent fevers or chills. No nausea or vomiting. No chest pains or shortness of breath.     Allergies   Allergen Reactions   • Keflex      Pt unsure of reaction; \"its been years\"       Current medicines (including changes today)  Current Outpatient Medications   Medication Sig Dispense Refill   • sertraline (ZOLOFT) 25 MG tablet Take 1 Tab by mouth every day. 60 Tab 1   • Magnesium Gluconate 250 MG Tab Take 1 Tab by mouth 2 Times a Day. 180 Tab 1   • furosemide (LASIX) 20 MG Tab Take 1 Tab by mouth every day. 30 Tab 3   • potassium chloride SA (K-DUR) 10 MEQ Tab CR Take 1 Tab by mouth every day. 30 Tab 3   • metoprolol (LOPRESSOR) 25 MG Tab Take 0.5 Tabs by mouth 2 Times a Day. 60 Tab 3   • warfarin (COUMADIN) 5 MG Tab Take 1 Tab by mouth every day at 6 PM. 30 Tab 3   • aspirin EC (ECOTRIN) 81 MG Tablet Delayed Response Take 81 mg by mouth every day.     • amiodarone (PACERONE) 400 MG tablet Take 1 Tab by mouth 2 times a day. For 1 week and then 1 tablet daily (Patient not taking: Reported on 7/2/2020) 37 Tab 2   • oxyCODONE immediate-release (ROXICODONE) 5 MG Tab Take 1 Tab by mouth every 8 hours as needed for up to 10 days. " "(Patient not taking: Reported on 7/2/2020) 30 Tab 0   • omeprazole (PRILOSEC) 20 MG delayed-release capsule Take 1 Cap by mouth every day. (Patient not taking: Reported on 7/2/2020) 30 Cap 2   • ondansetron (ZOFRAN ODT) 4 MG TABLET DISPERSIBLE Take 4 mg by mouth every 6 hours as needed for Nausea.       No current facility-administered medications for this visit.        Patient Active Problem List    Diagnosis Date Noted   • Severe mitral regurgitation 05/07/2020     Priority: High   • Hospital discharge follow-up 07/02/2020   • S/P mitral valve repair 06/25/2020   • Anxiety 05/29/2020   • Mixed hyperlipidemia 05/15/2020   • Atrial fibrillation (HCC) 05/07/2020   • Nausea 05/07/2020   • Other hemorrhoids 04/21/2020       Family History   Problem Relation Age of Onset   • Lung Disease Mother    • Alzheimer's Disease Father    • Diabetes Maternal Grandmother        She  has a past medical history of Arrhythmia, Heart murmur (2019), Heart valve disease, Hyperlipidemia, Kidney stone, HEATHER (obstructive sleep apnea), and Pneumonia (2015).  She  has a past surgical history that includes bladder neck contracture exicision; lithotripsy (2018); pr replacement of mitral valve (6/17/2020); and crista (6/17/2020).       Objective:   Vitals obtained by patient:  /75 (BP Location: Left arm, Patient Position: Sitting, BP Cuff Size: Adult)   Temp 37.1 °C (98.7 °F) (Temporal)   Ht 1.702 m (5' 7\")   Wt 56.7 kg (125 lb)   BMI 19.58 kg/m²      Physical Exam:  General: No acute distress. Well nourished.   HEENT: EOM grossly intact, no scleral icterus, no pharyngeal erythema.   Neck:  No JVD noted at 90 degrees, trachea midline  CVS: Pulse as reported by patient, no visible LE edema.  Resp: Unlabored respiratory effort, no cough or audible wheeze  MSK/Ext: No clubbing or cyanosis visible appreciated.  Skin: No rashes in visible areas.  Neurological: AOx3. CN III-XII grossly intact. No focal deficits.     LABS: 6/22  results reviewed " and discussed with the patient, questions answered.    Patient was seen for 25 minutes face to face of which > 50% of appointment time was spent on counseling and coordination of care regarding the above.   Assessment and Plan:   1. Anxiety  Stable. Restarted on Zoloft at 25 mg, ok to increase to 50 mg in 1 week. Discussed relaxation techniques in great detail  - sertraline (ZOLOFT) 25 MG tablet; Take 1 Tab by mouth every day.  Dispense: 60 Tab; Refill: 1  - Magnesium Gluconate 250 MG Tab; Take 1 Tab by mouth 2 Times a Day.  Dispense: 180 Tab; Refill: 1    2. Hospital discharge follow-up  Stable. Has two cardiology follow up appts.       Follow-up: Return if symptoms worsen or fail to improve.

## 2020-07-02 NOTE — ASSESSMENT & PLAN NOTE
S/p scheduled MVR on 6/17/20. PT states she is doing well, pain is well controlled. She has great support systems in . HH is following at home. She has a fu appt with cardiology in a few weeks. She is requesting to reinitiate Sertraline as she was given this in the hospital. She was on a 50 mg dose and it worked well. Off of Sertraline x 2 weeks.

## 2020-07-03 ENCOUNTER — PATIENT MESSAGE (OUTPATIENT)
Dept: CARDIOLOGY | Facility: MEDICAL CENTER | Age: 75
End: 2020-07-03

## 2020-07-06 ENCOUNTER — TELEPHONE (OUTPATIENT)
Dept: CARDIOTHORACIC SURGERY | Facility: MEDICAL CENTER | Age: 75
End: 2020-07-06

## 2020-07-06 ENCOUNTER — ANTICOAGULATION MONITORING (OUTPATIENT)
Dept: MEDICAL GROUP | Facility: PHYSICIAN GROUP | Age: 75
End: 2020-07-06

## 2020-07-06 DIAGNOSIS — Z98.890 S/P MITRAL VALVE REPAIR: ICD-10-CM

## 2020-07-06 DIAGNOSIS — I48.91 ATRIAL FIBRILLATION, UNSPECIFIED TYPE (HCC): ICD-10-CM

## 2020-07-06 LAB — INR PPP: 1.5 (ref 2–3.5)

## 2020-07-06 NOTE — TELEPHONE ENCOUNTER
"Received voicemail from patient concerned that she is still losing weight and \"is scared\" she won't be able to put any weight back on.  She stated in her voicemail she is still taking the lasix and states she is eating well.    I called and left a message for her with my call back info.      "

## 2020-07-06 NOTE — PROGRESS NOTES
OP Telephone Anticoagulation Service Note    Date: 2020      Anticoagulation Summary  As of 2020    INR goal:   2.0-3.0   TTR:   0.0 % (1 d)   INR used for dosin.50! (2020)   Warfarin maintenance plan:   5 mg (5 mg x 1) every day   Weekly warfarin total:   35 mg   Plan last modified:   Felicita Leigh PharmD (2020)   Next INR check:   2020   Target end date:       Indications    Atrial fibrillation (HCC) [I48.91]  S/P mitral valve repair [Z98.890]             Anticoagulation Episode Summary     INR check location:       Preferred lab:       Send INR reminders to:       Comments:   Shruti  [phone]      Anticoagulation Care Providers     Provider Role Specialty Phone number    PARK Baez Referring Family Medicine [phone]    Margy Dunbar P.A.-C. Referring Cardiology [phone]    [facility] Anticoagulation Services Responsible  [phone]        Anticoagulation Patient Findings        Plan: Spoke with patient on the phone. Patient is SUB therapeutic today. Confirmed dosing. No missed tablets in the last week. Patient denies any changes in medications or diet. Patient denies any signs or symptoms of bleeding or clotting. Instructed patient to call clinic if any unusual bleeding or bruising occurs. Will increase warfarin dose. Will follow-up with patient in 3 day(s).      Julissa Fox, AkbarD

## 2020-07-06 NOTE — TELEPHONE ENCOUNTER
Received call back from Frances, I reassured her that weight loss is normal., She is at 123 lbs. She states she is eating well, drinking enough water, is urinating appropriately, and doesn't feel dehydrated.  I do not suspect that she is over diuresed based on her answers.  She said she would call me if she went less that 115 lbs.    Call time 5 minutes.

## 2020-07-08 ENCOUNTER — TELEPHONE (OUTPATIENT)
Dept: CARDIOTHORACIC SURGERY | Facility: MEDICAL CENTER | Age: 75
End: 2020-07-08

## 2020-07-08 ENCOUNTER — PATIENT MESSAGE (OUTPATIENT)
Dept: MEDICAL GROUP | Facility: PHYSICIAN GROUP | Age: 75
End: 2020-07-08

## 2020-07-08 NOTE — TELEPHONE ENCOUNTER
Patient called stating she wanted to stop her lasix.  I spoke with Wilma in office who verbalized that she could stop at this time.    I told Frances to stop the lasix AND potassium as they went together.  I told her if SOB, loss of volume on her IS, or swelling in her legs occurred to call me and we would evaluate if she needed to go back on it.    She stated her weight was down to 120 lbs.  I do again educate that weight loss after surgery was normal and that beyond water weight loss patient's can and do lose some muscle mass from surgery as well.    Call time 2 1/2 minutes

## 2020-07-09 ENCOUNTER — ANTICOAGULATION MONITORING (OUTPATIENT)
Dept: VASCULAR LAB | Facility: MEDICAL CENTER | Age: 75
End: 2020-07-09

## 2020-07-09 DIAGNOSIS — Z98.890 S/P MITRAL VALVE REPAIR: ICD-10-CM

## 2020-07-09 DIAGNOSIS — I48.91 ATRIAL FIBRILLATION, UNSPECIFIED TYPE (HCC): ICD-10-CM

## 2020-07-09 LAB — INR PPP: 2.4 (ref 2–3.5)

## 2020-07-09 RX ORDER — ONDANSETRON 4 MG/1
4 TABLET, ORALLY DISINTEGRATING ORAL EVERY 6 HOURS PRN
Qty: 10 TAB | Refills: 1 | Status: SHIPPED | OUTPATIENT
Start: 2020-07-09 | End: 2020-07-15

## 2020-07-09 NOTE — PROGRESS NOTES
OP   Telephone Anticoagulation Service Note      Anticoagulation Summary  As of 2020    INR goal:   2.0-3.0   TTR:   33.3 % (4 d)   INR used for dosin.40 (2020)   Warfarin maintenance plan:   2.5 mg (5 mg x 0.5) every Tue, Sat; 5 mg (5 mg x 1) all other days   Weekly warfarin total:   30 mg   Plan last modified:   Janet Castro (2020)   Next INR check:   2020   Target end date:       Indications    Atrial fibrillation (HCC) [I48.91]  S/P mitral valve repair [Z98.890]             Anticoagulation Episode Summary     INR check location:       Preferred lab:       Send INR reminders to:       Comments:   Shruti DOVE [phone]      Anticoagulation Care Providers     Provider Role Specialty Phone number    PARK Baez Referring Family Medicine [phone]    Margy Dunbar P.A.-C. Referring Cardiology [phone]    [facility] Anticoagulation Services Responsible  [phone]        Anticoagulation Patient Findings  Patient Findings     Negatives:   Signs/symptoms of thrombosis, Signs/symptoms of bleeding, Laboratory test error suspected, Change in health, Change in alcohol use, Change in activity, Upcoming invasive procedure, Emergency department visit, Upcoming dental procedure, Missed doses, Extra doses, Change in medications, Change in diet/appetite, Hospital admission, Bruising, Other complaints        Spoke with the patient on the phone today, reporting a therapeutic INR of 2.4. Confirmed the current warfarin dosing regimen and patient compliance. Patient denies any interval changes to diet and/or medications. Patient denies any signs/symptoms of bleeding or clotting.  Patient wants to stay under 2.5 (closer to 2.0-2.2 range), so would will decrease regimen a little and will have to monitor closely and adjust, as we are still trying to get her stable.   Patient will retest again in 4 days on Monday. Orders sent to Shruti DOVE.     Anastasiya WebberD

## 2020-07-13 ENCOUNTER — ANTICOAGULATION MONITORING (OUTPATIENT)
Dept: VASCULAR LAB | Facility: MEDICAL CENTER | Age: 75
End: 2020-07-13

## 2020-07-13 DIAGNOSIS — I48.91 ATRIAL FIBRILLATION, UNSPECIFIED TYPE (HCC): ICD-10-CM

## 2020-07-13 DIAGNOSIS — Z98.890 S/P MITRAL VALVE REPAIR: ICD-10-CM

## 2020-07-13 LAB — INR PPP: 2.1 (ref 2–3.5)

## 2020-07-13 NOTE — PROGRESS NOTES
Anticoagulation Summary  As of 2020    INR goal:   2.0-3.0   TTR:   66.7 % (1.1 wk)   INR used for dosin.10 (2020)   Warfarin maintenance plan:   2.5 mg (5 mg x 0.5) every Tue, Sat; 5 mg (5 mg x 1) all other days   Weekly warfarin total:   30 mg   Plan last modified:   Petr Ariza PharmD (2020)   Next INR check:   2020   Target end date:       Indications    Atrial fibrillation (HCC) [I48.91]  S/P mitral valve repair [Z98.890]             Anticoagulation Episode Summary     INR check location:       Preferred lab:       Send INR reminders to:       Comments:   Shruti  [phone]      Anticoagulation Care Providers     Provider Role Specialty Phone number    PARK Baez Referring Family Medicine [phone]    Margy Dunbar P.A.-C. Referring Cardiology [phone]    [facility] Anticoagulation Services Responsible  [phone]        Anticoagulation Patient Findings    Spoke to patient on the phone.   INR  therapeutic.   Denies signs/symptoms of bleeding and/or thrombosis.   Denies changes to diet or medications.   Follow up appointment in 3 days    Continue weekly warfarin dose as noted      Petr Ariza PharmD, MS, BCACP, LCC    This note was created using voice recognition software (Dragon). The accuracy of the dictation is limited by the abilities of the software. I have reviewed the note prior to signing, however some errors in grammar and context are still possible. If you have any questions related to this note please do not hesitate to contact our office.

## 2020-07-15 ENCOUNTER — OFFICE VISIT (OUTPATIENT)
Dept: CARDIOLOGY | Facility: MEDICAL CENTER | Age: 75
End: 2020-07-15
Payer: MEDICARE

## 2020-07-15 VITALS
WEIGHT: 126.32 LBS | SYSTOLIC BLOOD PRESSURE: 102 MMHG | DIASTOLIC BLOOD PRESSURE: 66 MMHG | BODY MASS INDEX: 19.83 KG/M2 | OXYGEN SATURATION: 96 % | HEART RATE: 76 BPM | HEIGHT: 67 IN

## 2020-07-15 DIAGNOSIS — I34.0 SEVERE MITRAL REGURGITATION: ICD-10-CM

## 2020-07-15 DIAGNOSIS — I48.91 ATRIAL FIBRILLATION, UNSPECIFIED TYPE (HCC): ICD-10-CM

## 2020-07-15 DIAGNOSIS — E78.2 MIXED HYPERLIPIDEMIA: ICD-10-CM

## 2020-07-15 DIAGNOSIS — Z98.890 S/P MITRAL VALVE REPAIR: ICD-10-CM

## 2020-07-15 PROCEDURE — 99214 OFFICE O/P EST MOD 30 MIN: CPT | Performed by: NURSE PRACTITIONER

## 2020-07-15 RX ORDER — AMIODARONE HYDROCHLORIDE 200 MG/1
TABLET ORAL
COMMUNITY
Start: 2020-06-23 | End: 2020-07-21 | Stop reason: SDUPTHER

## 2020-07-15 ASSESSMENT — ENCOUNTER SYMPTOMS
NAUSEA: 0
CHILLS: 0
WHEEZING: 0
SPUTUM PRODUCTION: 0
COUGH: 0
PND: 0
FEVER: 0
DIZZINESS: 0
VOMITING: 0
PALPITATIONS: 0
SHORTNESS OF BREATH: 0
HEADACHES: 0
CLAUDICATION: 0
ORTHOPNEA: 0
HEMOPTYSIS: 0

## 2020-07-15 ASSESSMENT — FIBROSIS 4 INDEX: FIB4 SCORE: 4.06

## 2020-07-15 NOTE — PROGRESS NOTES
Chief Complaint   Patient presents with   • Atrial Fibrillation       Subjective:   Frances Sanchez is a 75 y.o. female who presents today for atrial fibrillation, mitral valve prolapse, and moderate to severe mitral regurgitation.  Patient is followed by Dr. Gimenez.    7/15/2020: Patient was hospitalized June 17-24, 2020 for mitral valve repair pair (P2 triangular resection, 34 mm Montoya flexible annuloplasty band) left atrial appendage excision/ligation.  Her incision is well-healed.  She has many questions regarding what she can and cannot do.  She had no questions by the end of her office visit.  We have discussed her medications and I did recommend life long anticoagulation however she is very much against this.    Patient comes in today as she is going to be having mitral valve surgery on 6/17/2020.  She was concerned about her wrist after her cardiac catheterization.  There is no sign of infection, no hematoma, improving ecchymosis.  She is concerned that her arm a few days ago was feeling cold.  Arm is warm today with good pulses.     Patient has occasional palpitations feels like her heart is beating more forcefully, fast and slow lasts for a few hours then stops has not happened in the last 2 months.  She has had a murmur and was told would need surgery at some point.     Previously had paroxysmal atrial fibrillation 2014 [place] and then November 2019.  Patient has [id number] score 2, previously suggested Xarelto but then was on aspirin.  Dr. Gimenez recommended  mg.     Echocardiogram 5/7/2020 with prolapse of the posterior leaflet with moderate to severe mitral regurgitation, RVSP 35.     Has hyperlipidemia, .  Has vitamin D deficiency, back in normal range.  Has had elevated A1c in the past but not recent.     She is not limited by chest pain, pressure or tightness with activity.   She can walk up/down stairs and walk one block, feels mild DAWKINS.  No orthopnea or lower  extremity swelling.   Some mild lightheadedness.   No presyncope/syncope.   No symptoms of leg claudication.   No stroke/TIA like symptoms.     No family history of premature coronary artery disease.  No prior smoking history.  No history of diabetes.  No history of hypertension. BP up today, she is nervious, prior BP has been ok (reviewed by me)  No history of autoimmune disease such as lupus or rheumatoid arthritis.  No chronic kidney disease.     Here with her , Aleksandar.      Past Medical History:   Diagnosis Date   • Arrhythmia     hx atrial fibrillation   • Heart murmur 2019    mitral valve prolapse   • Heart valve disease    • Hyperlipidemia    • Kidney stone     hx   • HEATHER (obstructive sleep apnea)     pt denies    • Pneumonia 2015     Past Surgical History:   Procedure Laterality Date   • PB REPLACEMENT OF MITRAL VALVE  6/17/2020    Procedure: MITRAL VALVE REPAIR;  Surgeon: Jeronimo Bryan M.D.;  Location: SURGERY [facility];  Service: Cardiothoracic   • BRIGID  6/17/2020    Procedure: ECHOCARDIOGRAM, TRANSESOPHAGEAL;  Surgeon: Jeronimo Bryan M.D.;  Location: SURGERY [facility];  Service: Cardiothoracic   • LITHOTRIPSY  2018   • BLADDER NECK CONTRACTURE EXICISION       Family History   Problem Relation Age of Onset   • Lung Disease Mother    • Alzheimer's Disease Father    • Diabetes Maternal Grandmother      Social History     Socioeconomic History   • Marital status:      Spouse name: Not on file   • Number of children: Not on file   • Years of education: Not on file   • Highest education level: Not on file   Occupational History   • Not on file   Social Needs   • Financial resource strain: Not on file   • Food insecurity     Worry: Not on file     Inability: Not on file   • Transportation needs     Medical: Not on file     Non-medical: Not on file   Tobacco Use   • Smoking status: Never Smoker   • Smokeless tobacco: Never Used   Substance and Sexual Activity   • Alcohol use: No   • Drug  "use: Never   • Sexual activity: Yes     Partners: Male   Lifestyle   • Physical activity     Days per week: Not on file     Minutes per session: Not on file   • Stress: Not on file   Relationships   • Social connections     Talks on phone: Not on file     Gets together: Not on file     Attends [employer]ism service: Not on file     Active member of club or organization: Not on file     Attends meetings of clubs or organizations: Not on file     Relationship status: Not on file   • Intimate partner violence     Fear of current or ex partner: Not on file     Emotionally abused: Not on file     Physically abused: Not on file     Forced sexual activity: Not on file   Other Topics Concern   • Not on file   Social History Narrative   • Not on file     Allergies   Allergen Reactions   • Keflex      Pt unsure of reaction; \"its been years\"     Outpatient Encounter Medications as of 7/15/2020   Medication Sig Dispense Refill   • metoprolol (LOPRESSOR) 25 MG Tab Take 0.5 Tabs by mouth 2 Times a Day. 60 Tab 3   • warfarin (COUMADIN) 5 MG Tab Take 1 Tab by mouth every day at 6 PM. 30 Tab 3   • aspirin EC (ECOTRIN) 81 MG Tablet Delayed Response Take 81 mg by mouth every day.     • amiodarone (CORDARONE) 200 MG Tab      • [DISCONTINUED] ondansetron (ZOFRAN ODT) 4 MG TABLET DISPERSIBLE Take 1 Tab by mouth every 6 hours as needed for Nausea. (Patient not taking: Reported on 7/15/2020) 10 Tab 1   • Magnesium Gluconate 250 MG Tab Take 1 Tab by mouth 2 Times a Day. 180 Tab 1   • [DISCONTINUED] sertraline (ZOLOFT) 25 MG tablet Take 1 Tab by mouth every day. (Patient not taking: Reported on 7/15/2020) 60 Tab 1   • [DISCONTINUED] amiodarone (PACERONE) 400 MG tablet Take 1 Tab by mouth 2 times a day. For 1 week and then 1 tablet daily 37 Tab 2   • [DISCONTINUED] furosemide (LASIX) 20 MG Tab Take 1 Tab by mouth every day. (Patient not taking: Reported on 7/15/2020) 30 Tab 3   • [DISCONTINUED] potassium chloride SA (K-DUR) 10 MEQ Tab CR Take 1 " "Tab by mouth every day. (Patient not taking: Reported on 7/15/2020) 30 Tab 3   • [DISCONTINUED] omeprazole (PRILOSEC) 20 MG delayed-release capsule Take 1 Cap by mouth every day. (Patient not taking: Reported on 7/2/2020) 30 Cap 2     No facility-administered encounter medications on file as of 7/15/2020.      Review of Systems   Constitutional: Negative for chills and fever.   Respiratory: Negative for cough, hemoptysis, sputum production, shortness of breath and wheezing.    Cardiovascular: Negative for chest pain, palpitations, orthopnea, claudication, leg swelling and PND.   Gastrointestinal: Negative for nausea and vomiting.   Neurological: Negative for dizziness and headaches.   All other systems reviewed and are negative.       Objective:   /66 (BP Location: Left arm, Patient Position: Sitting, BP Cuff Size: Adult)   Pulse 76   Ht 1.702 m (5' 7\")   Wt 57.3 kg (126 lb 5.2 oz)   SpO2 96%   BMI 19.79 kg/m²     Physical Exam   Constitutional: She appears well-developed and well-nourished.   Eyes: EOM are normal.   Neck: Neck supple. No JVD present.   Cardiovascular: Normal rate, regular rhythm and normal heart sounds.   No murmur heard.  Well-healing sternal incision, CDI, no signs of infection   Pulmonary/Chest: Effort normal and breath sounds normal.   Abdominal: Soft.   Neurological:   Cranial nerves II-XII WNL   Skin: Skin is warm and dry.   Psychiatric: She has a normal mood and affect. Her behavior is normal. Judgment and thought content normal.   Nursing note and vitals reviewed.     Mitral valve repair 6/17/2020  PROCEDURES PERFORMED: 6/17/2020 Radical mitral valve repair (P2 triangular resection, 34 mm Montoya flexible annuloplasty band), left atrial appendage excision/ligation and intraoperative transesophageal echocardiography.     Echocardiogram 5/7/2020  No prior study is available for comparison.   Left ventricular ejection fraction is visually estimated to be 65%.  Normal regional wall " motion.  Prolapse of the posterior mitral leaflet was present.  Moderate appearing, likely severe anterior directed  mitral   regurgitation.  Estimated right ventricular systolic pressure  is 35 mmHg.     ECG 5/7/2020, SR, 77, borderline anterior ST depression    Assessment:     1. Atrial fibrillation, unspecified type (HCC)     2. Mixed hyperlipidemia     3. Severe mitral regurgitation     4. S/P mitral valve repair         Medical Decision Making:  Today's Assessment / Status / Plan:   Paroxysmal atrial fibrillation  - Amiodarone 200 mg daily  -Continue metoprolol 12.5 mg twice daily  -Will discuss anticoagulation with Dr. Gimenez    S/P mitral valve repair  -Continue warfarin for 3 months    Collaborating MD is Dr. Vergara.  Follow-up visit in 3 months with Dr. Gimenez.    Please note that this dictation was created using voice recognition software.  I have made every reasonable attempt to correct obvious errors, but it is possible there are errors of grammar or possibly content that I did not discover before finalizing the note.

## 2020-07-16 ENCOUNTER — ANTICOAGULATION MONITORING (OUTPATIENT)
Dept: VASCULAR LAB | Facility: MEDICAL CENTER | Age: 75
End: 2020-07-16

## 2020-07-16 DIAGNOSIS — Z98.890 S/P MITRAL VALVE REPAIR: ICD-10-CM

## 2020-07-16 DIAGNOSIS — I48.91 ATRIAL FIBRILLATION, UNSPECIFIED TYPE (HCC): ICD-10-CM

## 2020-07-16 LAB — INR PPP: 1.8 (ref 2–3.5)

## 2020-07-16 NOTE — PROGRESS NOTES
Anticoagulation Summary  As of 2020    INR goal:   2.0-3.0   TTR:   57.6 % (1.6 wk)   INR used for dosin.80! (2020)   Warfarin maintenance plan:   2.5 mg (5 mg x 0.5) every Tue, Sat; 5 mg (5 mg x 1) all other days   Weekly warfarin total:   30 mg   Plan last modified:   Petr Ariza, PharmD (2020)   Next INR check:      Target end date:       Indications    Atrial fibrillation (HCC) [I48.91]  S/P mitral valve repair [Z98.890]             Anticoagulation Episode Summary     INR check location:       Preferred lab:       Send INR reminders to:       Comments:   Shruti DOVE [phone]      Anticoagulation Care Providers     Provider Role Specialty Phone number    PARK Baez Referring Family Medicine [phone]    Margy Dunbar P.A.-C. Referring Cardiology [phone]    [facility] Anticoagulation Services Responsible  [phone]        Anticoagulation Patient Findings          Spoke with patient to report a sub therapeutic INR of 1.8.  Will BOLUS dose with 7.5mg and increase weekly dose by 8%.  Follow up in 4 days, to reduce the risk of adverse events related to this high risk medication, warfarin.    Angela Corral, Clinical Pharmacist    Shruti DOVE to retest INR 2020

## 2020-07-20 ENCOUNTER — ANTICOAGULATION MONITORING (OUTPATIENT)
Dept: MEDICAL GROUP | Facility: PHYSICIAN GROUP | Age: 75
End: 2020-07-20

## 2020-07-20 DIAGNOSIS — Z98.890 S/P MITRAL VALVE REPAIR: ICD-10-CM

## 2020-07-20 LAB — INR PPP: 2.1 (ref 2–3.5)

## 2020-07-20 NOTE — PROGRESS NOTES
Anticoagulation Summary  As of 2020    INR goal:   2.0-3.0   TTR:   51.1 % (2.1 wk)   INR used for dosin.10 (2020)   Warfarin maintenance plan:   2.5 mg (5 mg x 0.5) every Tue; 5 mg (5 mg x 1) all other days   Weekly warfarin total:   32.5 mg   Plan last modified:   Angela M Filter (2020)   Next INR check:   2020   Target end date:       Indications    Atrial fibrillation (HCC) [I48.91]  S/P mitral valve repair [Z98.890]             Anticoagulation Episode Summary     INR check location:       Preferred lab:       Send INR reminders to:       Comments:   Shruti  [phone]      Anticoagulation Care Providers     Provider Role Specialty Phone number    PARK Baez Referring Family Medicine [phone]    Margy Dunbar P.A.-C. Referring Cardiology [phone]    [facility] Anticoagulation Services Responsible  [phone]        Anticoagulation Patient Findings        Spoke to patient on the phone.   INR  therapeutic.   Denies signs/symptoms of bleeding and/or thrombosis.   Denies changes to diet or medications.   Follow up appointment in 1 week(s).    Continue weekly warfarin dose as noted    Faxed     Petr Ariza, PharmD, MS, BCACP, LCC    This note was created using voice recognition software (Dragon). The accuracy of the dictation is limited by the abilities of the software. I have reviewed the note prior to signing, however some errors in grammar and context are still possible. If you have any questions related to this note please do not hesitate to contact our office.

## 2020-07-21 ENCOUNTER — TELEMEDICINE (OUTPATIENT)
Dept: MEDICAL GROUP | Facility: PHYSICIAN GROUP | Age: 75
End: 2020-07-21
Payer: MEDICARE

## 2020-07-21 VITALS
HEIGHT: 67 IN | TEMPERATURE: 98.2 F | SYSTOLIC BLOOD PRESSURE: 142 MMHG | WEIGHT: 125 LBS | BODY MASS INDEX: 19.62 KG/M2 | DIASTOLIC BLOOD PRESSURE: 56 MMHG

## 2020-07-21 DIAGNOSIS — K59.03 DRUG-INDUCED CONSTIPATION: ICD-10-CM

## 2020-07-21 DIAGNOSIS — F41.9 ANXIETY: ICD-10-CM

## 2020-07-21 DIAGNOSIS — I48.91 ATRIAL FIBRILLATION, UNSPECIFIED TYPE (HCC): ICD-10-CM

## 2020-07-21 DIAGNOSIS — R11.0 NAUSEA: ICD-10-CM

## 2020-07-21 PROBLEM — K59.00 CONSTIPATION: Status: ACTIVE | Noted: 2020-07-21

## 2020-07-21 PROCEDURE — 99214 OFFICE O/P EST MOD 30 MIN: CPT | Mod: 95,CR | Performed by: NURSE PRACTITIONER

## 2020-07-21 RX ORDER — FLUOXETINE 10 MG/1
10 CAPSULE ORAL DAILY
Qty: 30 CAP | Refills: 1 | Status: SHIPPED | OUTPATIENT
Start: 2020-07-21 | End: 2020-08-04 | Stop reason: SDUPTHER

## 2020-07-21 RX ORDER — AMIODARONE HYDROCHLORIDE 200 MG/1
200 TABLET ORAL DAILY
Qty: 30 TAB | Refills: 6 | Status: SHIPPED | OUTPATIENT
Start: 2020-07-21 | End: 2020-10-29

## 2020-07-21 ASSESSMENT — FIBROSIS 4 INDEX: FIB4 SCORE: 4.06

## 2020-07-21 NOTE — ASSESSMENT & PLAN NOTE
New problem.  Patient reports intermittent constipation noted after starting medications, status post cardiac surgery x 5 weeks ago.  She is trying to control her symptoms with OTC Metamucil.  She denies abdominal pain or distention, no blood in stool.

## 2020-07-21 NOTE — PROGRESS NOTES
"Telemedicine Visit: Established Patient     This encounter was conducted via Zoom .   Verbal consent was obtained. Patient's identity was verified.    Subjective:     Chief Complaint   Patient presents with   • Medication Problem     Frances Sanchez is a 75 y.o. female presenting for evaluation and management of following problems:    Anxiety  Chronic problem.  Patient stopped Zoloft states it made her stomach sensitive cause nausea.  Patient would like a prescription of Prozac.    Nausea  Chronic problem.  Patient thinks her uncontrolled anxiety is causing nausea.  She is not taking any medication prescribed for nausea.    Constipation  New problem.  Patient reports intermittent constipation noted after starting medications, status post cardiac surgery x 5 weeks ago.  She is trying to control her symptoms with OTC Metamucil.  She denies abdominal pain or distention, no blood in stool.      ROS   Denies any recent fevers or chills. No nausea or vomiting. No chest pains or shortness of breath.     Allergies   Allergen Reactions   • Keflex      Pt unsure of reaction; \"its been years\"       Current medicines (including changes today)  Current Outpatient Medications   Medication Sig Dispense Refill   • FLUoxetine (PROZAC) 10 MG Cap Take 1 Cap by mouth every day. 30 Cap 1   • amiodarone (CORDARONE) 200 MG Tab      • metoprolol (LOPRESSOR) 25 MG Tab Take 0.5 Tabs by mouth 2 Times a Day. 60 Tab 3   • warfarin (COUMADIN) 5 MG Tab Take 1 Tab by mouth every day at 6 PM. 30 Tab 3   • Magnesium Gluconate 250 MG Tab Take 1 Tab by mouth 2 Times a Day. (Patient not taking: Reported on 7/21/2020) 180 Tab 1   • aspirin EC (ECOTRIN) 81 MG Tablet Delayed Response Take 81 mg by mouth every day.       No current facility-administered medications for this visit.        Patient Active Problem List    Diagnosis Date Noted   • Severe mitral regurgitation 05/07/2020     Priority: High   • Constipation 07/21/2020   • Hospital discharge " "follow-up 07/02/2020   • S/P mitral valve repair 06/25/2020   • Anxiety 05/29/2020   • Mixed hyperlipidemia 05/15/2020   • Atrial fibrillation (HCC) 05/07/2020   • Nausea 05/07/2020   • Other hemorrhoids 04/21/2020       Family History   Problem Relation Age of Onset   • Lung Disease Mother    • Alzheimer's Disease Father    • Diabetes Maternal Grandmother        She  has a past medical history of Arrhythmia, Heart murmur (2019), Heart valve disease, Hyperlipidemia, Kidney stone, HEATHER (obstructive sleep apnea), and Pneumonia (2015).  She  has a past surgical history that includes bladder neck contracture exicision; lithotripsy (2018); pr replacement of mitral valve (6/17/2020); and crista (6/17/2020).       Objective:   Vitals obtained by patient:  /56 (BP Location: Left arm, Patient Position: Sitting, BP Cuff Size: Adult)   Temp 36.8 °C (98.2 °F) (Temporal)   Ht 1.702 m (5' 7\")   Wt 56.7 kg (125 lb)   BMI 19.58 kg/m²      Physical Exam:  General: No acute distress. Well nourished.   HEENT: EOM grossly intact, no scleral icterus, no pharyngeal erythema.   Neck:  No JVD noted at 90 degrees, trachea midline  CVS: Pulse as reported by patient, no visible LE edema.  Resp: Unlabored respiratory effort, no cough or audible wheeze  MSK/Ext: No clubbing or cyanosis visible appreciated.  Skin: No rashes in visible areas.  Neurological: AOx3. CN III-XII grossly intact. No focal deficits.     Patient was seen for 25 minutes face to face of which > 50% of appointment time was spent on counseling and coordination of care regarding the above.   Assessment and Plan:   1. Anxiety  Uncontrolled, stable.  Trial of Prozac.  Patient educated on possible side effects and recommendation of gradual weaning if desired.    2. Nausea  Uncontrolled, stable.  Discussed possible etiology.  This could be due to uncontrolled anxiety.  Also recommending intermittent snacking for GI friendly foods such as crackers, oatmeal.    3. Drug-induced " constipation  Uncontrolled, stable.  Recommending daily adequate fiber, adequate fluids.  Also recommend nightly magnesium, OTC milk of magnesia.       Follow-up: Return if symptoms worsen or fail to improve.

## 2020-07-21 NOTE — ASSESSMENT & PLAN NOTE
Chronic problem.  Patient stopped Zoloft states it made her stomach sensitive cause nausea.  Patient would like a prescription of Prozac.

## 2020-07-21 NOTE — ASSESSMENT & PLAN NOTE
Chronic problem.  Patient thinks her uncontrolled anxiety is causing nausea.  She is not taking any medication prescribed for nausea.

## 2020-07-22 NOTE — PROGRESS NOTES
CHIEF COMPLAINT: Post-op visit     PROCEDURE: 6/17/2020 6/17/2020 Radical mitral valve repair (P2 triangular resection, 34 mm Montoya flexible annuloplasty band), left atrial appendage excision/ligation and intraoperative transesophageal echocardiography.    HPI: Mrs. Sanchez returns today for follow up visit.  She is accompanied by her .  They have several questions regarding the surgery, medications and continued care.  We reviewed the band that was used for the repair.  They asked if they could come down/off Amiodarone and Metoprolol as well as the Coumadin.  We discussed each medication, in detail, and the reason it was being used.  She has been walking without chest pressure, dizziness, near syncope or syncope.  Is considering cardiac rehabilitation.    /82 (BP Location: Right arm, Patient Position: Sitting, BP Cuff Size: Adult)   Pulse 82   Temp 36 °C (96.8 °F) (Temporal)   SpO2 97%     PHYSICAL EXAM:  CARDIAC: S1, S2, no murmurs, gallops, rub  PULMONARY: CTA bilaterally  SKIN: no edema  INCISIONS: Sternum stable, wounds well healed    PLAN:   We discussed continued post operative sternal precautions as well as driving restrictions moving forward.  I discussed SBE prophylaxis with her and her .  I discussed continuing every medication as prescribed until Cardiology recommends a decrease/change.  She will continue Coumadin for at least 3 months and then will discuss switching to NOAC or continued Coumadin given her hx of afib..    Overall, we are very pleased with her progress and have instructed her to follow-up with us in the future should she have any concerns related to surgery.  Otherwise, we will see her on a PRN basis. She will continue to follow-up with her cardiologist and primary care physician. She has been informed that any further prescription refills should be done through primary care and/or cardiology.  She acknowledged understanding.

## 2020-07-24 LAB — INR PPP: 1.9 (ref 2–3.5)

## 2020-07-26 ENCOUNTER — PATIENT MESSAGE (OUTPATIENT)
Dept: CARDIOLOGY | Facility: MEDICAL CENTER | Age: 75
End: 2020-07-26

## 2020-07-27 ENCOUNTER — NON-PROVIDER VISIT (OUTPATIENT)
Dept: CARDIOLOGY | Facility: MEDICAL CENTER | Age: 75
End: 2020-07-27
Payer: MEDICARE

## 2020-07-27 ENCOUNTER — ANTICOAGULATION MONITORING (OUTPATIENT)
Dept: MEDICAL GROUP | Facility: PHYSICIAN GROUP | Age: 75
End: 2020-07-27

## 2020-07-27 ENCOUNTER — OFFICE VISIT (OUTPATIENT)
Dept: CARDIOTHORACIC SURGERY | Facility: MEDICAL CENTER | Age: 75
End: 2020-07-27
Payer: MEDICARE

## 2020-07-27 VITALS
TEMPERATURE: 96.8 F | SYSTOLIC BLOOD PRESSURE: 128 MMHG | OXYGEN SATURATION: 97 % | DIASTOLIC BLOOD PRESSURE: 82 MMHG | HEART RATE: 82 BPM

## 2020-07-27 DIAGNOSIS — Z98.890 POST-OPERATIVE STATE: ICD-10-CM

## 2020-07-27 DIAGNOSIS — Z79.899 HIGH RISK MEDICATION USE: ICD-10-CM

## 2020-07-27 DIAGNOSIS — Z98.890 S/P MITRAL VALVE REPAIR: ICD-10-CM

## 2020-07-27 LAB — INR PPP: 1.6 (ref 2–3.5)

## 2020-07-27 PROCEDURE — 99024 POSTOP FOLLOW-UP VISIT: CPT | Performed by: PHYSICIAN ASSISTANT

## 2020-07-27 PROCEDURE — 99999 PR NO CHARGE: CPT | Performed by: INTERNAL MEDICINE

## 2020-07-27 NOTE — PROGRESS NOTES
OP Telephone Anticoagulation Service Note    Date: 2020      Anticoagulation Summary  As of 2020    INR goal:   2.0-3.0   TTR:   43.9 % (3.1 wk)   INR used for dosin.60! (2020)   Warfarin maintenance plan:   5 mg (5 mg x 1) every day   Weekly warfarin total:   35 mg   Plan last modified:   Julissa Fox, Rochelle (2020)   Next INR check:      Target end date:       Indications    Atrial fibrillation (HCC) [I48.91]  S/P mitral valve repair [Z98.890]             Anticoagulation Episode Summary     INR check location:       Preferred lab:       Send INR reminders to:       Comments:   Shruti DOVE [phone]      Anticoagulation Care Providers     Provider Role Specialty Phone number    PARK Baez Referring Family Medicine [phone]    Margy Dunbar P.A.-C. Referring Cardiology [phone]    [facility] Anticoagulation Services Responsible  [phone]        Anticoagulation Patient Findings        Plan: Spoke with patient on the phone. Patient is SUB therapeutic today. Confirmed dosing. She thinks she missed a dose last week. Patient denies any changes in medications or diet. Patient denies any signs or symptoms of bleeding or clotting. Instructed patient to call clinic if any unusual bleeding or bruising occurs. Will have pt take 7.5 mg then 5 mg daily until next INR. Will follow-up with patient in 3 day(s).    Faxed to Shruti Fox, Rochelle

## 2020-07-27 NOTE — PATIENT COMMUNICATION
"Roshni Gimenez M.D.  You; ANCELMO Sampson. 21 minutes ago (11:11 AM)      All of antiarrhythmic medications along with antidepressants can cause prolonging of the QT but we do not routinely check EKGs for this and yes it would be safe for her to be on both medications in addition to metoprolol.  Please arrange for her to come in and have an ECG done to compare to prior since her prior QTC was 510.   Thank you.    Message text       You  ANCELMO Sampson.; Roshni Gimenez M.D. 24 minutes ago (11:08 AM)      To DB/LS in  abscence/care team - pt taking prozac and amio, recommend reach out to her pcp to change medication?     \"Concurrent use of FLUOXETINE and CLASS III ANTIARRHYTHMIC AGENTS may result in an increased risk of cardiotoxicity (QT prolongation, torsades de pointes, cardiac arrest). \" -[employer]     Message text        "

## 2020-07-30 ENCOUNTER — ANTICOAGULATION MONITORING (OUTPATIENT)
Dept: VASCULAR LAB | Facility: MEDICAL CENTER | Age: 75
End: 2020-07-30

## 2020-07-30 DIAGNOSIS — Z98.890 S/P MITRAL VALVE REPAIR: ICD-10-CM

## 2020-07-30 LAB — INR PPP: 1.9 (ref 2–3.5)

## 2020-07-30 NOTE — PROGRESS NOTES
OP Telephone Anticoagulation Service Note    Date: 2020      Anticoagulation Summary  As of 2020    INR goal:   2.0-3.0   TTR:   38.7 % (3.6 wk)   INR used for dosin.90! (2020)   Warfarin maintenance plan:   2.5 mg (5 mg x 0.5) every Tue; 5 mg (5 mg x 1) all other days   Weekly warfarin total:   32.5 mg   Plan last modified:   Julissa Fox PharmD (2020)   Next INR check:   8/3/2020   Target end date:       Indications    Atrial fibrillation (HCC) [I48.91]  S/P mitral valve repair [Z98.890]             Anticoagulation Episode Summary     INR check location:       Preferred lab:       Send INR reminders to:       Comments:   Shruti DOVE [phone]      Anticoagulation Care Providers     Provider Role Specialty Phone number    PARK Baez Referring Family Medicine [phone]    Margy Dunbar P.A.-C. Referring Cardiology [phone]    [facility] Anticoagulation Services Responsible  [phone]        Anticoagulation Patient Findings        Plan: Spoke with patient on the phone. Patient is SUB therapeutic today. Confirmed dosing. No missed tablets in the last week. Patient denies any changes in medications or diet. Patient denies any signs or symptoms of bleeding or clotting. Instructed patient to call clinic if any unusual bleeding or bruising occurs. Will have pt continue 5 mg daily. Will follow-up with patient in 4 day(s).        Julissa Fox PharmD

## 2020-08-03 ENCOUNTER — ANTICOAGULATION MONITORING (OUTPATIENT)
Dept: MEDICAL GROUP | Facility: PHYSICIAN GROUP | Age: 75
End: 2020-08-03

## 2020-08-03 DIAGNOSIS — Z98.890 S/P MITRAL VALVE REPAIR: ICD-10-CM

## 2020-08-03 DIAGNOSIS — I48.91 ATRIAL FIBRILLATION, UNSPECIFIED TYPE (HCC): ICD-10-CM

## 2020-08-03 LAB — INR PPP: 1.9 (ref 2–3.5)

## 2020-08-03 NOTE — PROGRESS NOTES
OP   Telephone Anticoagulation Service Note      Anticoagulation Summary  As of 8/3/2020    INR goal:   2.0-3.0   TTR:   33.3 % (4.1 wk)   INR used for dosin.90! (8/3/2020)   Warfarin maintenance plan:   5 mg (5 mg x 1) every day   Weekly warfarin total:   35 mg   Plan last modified:   Janet Castro (8/3/2020)   Next INR check:   8/10/2020   Target end date:       Indications    Atrial fibrillation (HCC) [I48.91]  S/P mitral valve repair [Z98.890]             Anticoagulation Episode Summary     INR check location:       Preferred lab:       Send INR reminders to:       Comments:   Shruti DOVE [phone]      Anticoagulation Care Providers     Provider Role Specialty Phone number    PARK Baez Referring Family Medicine [phone]    Margy Dunbar P.A.-C. Referring Cardiology [phone]    [facility] Anticoagulation [facility] Responsible  [phone]        Anticoagulation Patient Findings  Patient Findings     Negatives:   Signs/symptoms of thrombosis, Signs/symptoms of bleeding, Laboratory test error suspected, Change in health, Change in alcohol use, Change in activity, Upcoming invasive procedure, Emergency department visit, Upcoming dental procedure, Missed doses, Extra doses, Change in medications, Change in diet/appetite, Hospital admission, Bruising, Other complaints        Spoke with the patient on the phone today, reporting a SUB-therapeutic INR of 1.9. Confirmed the current warfarin dosing regimen and patient compliance. Patient is UNSURE, but reports it is possible that she missed a dose over the weekend. Patient denies any interval changes to diet and/or medications. Patient denies any signs/symptoms of bleeding or clotting.  Patient instructed to begin increased weekly regimen of 5mg QD and to retest again in 1 week. Orders sent to Shruti DOVE.     Anastasiya WebberD

## 2020-08-04 ENCOUNTER — TELEPHONE (OUTPATIENT)
Dept: MEDICAL GROUP | Facility: PHYSICIAN GROUP | Age: 75
End: 2020-08-04

## 2020-08-04 VITALS
BODY MASS INDEX: 19.46 KG/M2 | SYSTOLIC BLOOD PRESSURE: 126 MMHG | WEIGHT: 124 LBS | DIASTOLIC BLOOD PRESSURE: 57 MMHG | HEIGHT: 67 IN | HEART RATE: 68 BPM

## 2020-08-04 DIAGNOSIS — F41.9 ANXIETY: ICD-10-CM

## 2020-08-04 RX ORDER — FLUOXETINE 10 MG/1
10 CAPSULE ORAL DAILY
Qty: 30 CAP | Refills: 0 | Status: SHIPPED | OUTPATIENT
Start: 2020-08-04 | End: 2020-08-04

## 2020-08-04 RX ORDER — FLUOXETINE HYDROCHLORIDE 20 MG/1
20 CAPSULE ORAL DAILY
Qty: 30 CAP | Refills: 0 | Status: SHIPPED | OUTPATIENT
Start: 2020-08-04 | End: 2020-08-06 | Stop reason: SDUPTHER

## 2020-08-04 ASSESSMENT — FIBROSIS 4 INDEX: FIB4 SCORE: 4.06

## 2020-08-04 NOTE — TELEPHONE ENCOUNTER
Pt called stating that she was told that she could take 20 mg of prozac but that 10 mg was sent to the pharmacy. Per pt her insurance wont fill the rx until 8/14 but she won't be able to continue on the 20 mg, She would like to get a new rx for the 20mg sent to the pharmacy.    Wound Granulation?: early Wound Diameter In Cm(Optional): 0 Body Location Override (Optional): Right  earlobe Follow Up Time Frame (Optional): days Wound Crusting?: crusted Follow Up Units (Optional): 3 Wound Evaluated By (Optional): Luke Calzada., MD Patient To Follow-Up With?: our clinic Detail Level: Detailed

## 2020-08-06 ENCOUNTER — TELEMEDICINE (OUTPATIENT)
Dept: MEDICAL GROUP | Facility: PHYSICIAN GROUP | Age: 75
End: 2020-08-06
Payer: MEDICARE

## 2020-08-06 DIAGNOSIS — Z98.890 S/P MITRAL VALVE REPAIR: ICD-10-CM

## 2020-08-06 DIAGNOSIS — F41.9 ANXIETY: ICD-10-CM

## 2020-08-06 DIAGNOSIS — R11.0 NAUSEA: ICD-10-CM

## 2020-08-06 PROCEDURE — 99214 OFFICE O/P EST MOD 30 MIN: CPT | Mod: 95,CR | Performed by: NURSE PRACTITIONER

## 2020-08-06 RX ORDER — FLUOXETINE HYDROCHLORIDE 20 MG/1
20 CAPSULE ORAL DAILY
Qty: 30 CAP | Refills: 2 | Status: SHIPPED | OUTPATIENT
Start: 2020-08-06 | End: 2020-08-25 | Stop reason: SDUPTHER

## 2020-08-06 NOTE — PROGRESS NOTES
"Telemedicine Visit: Established Patient     This encounter was conducted via Zoom .   Verbal consent was obtained. Patient's identity was verified.    Subjective:     Chief Complaint   Patient presents with   • Follow-Up     Anxiety/Needs new prescription      Frances Sanchez is a 75 y.o. female presenting for evaluation and management of following problems:    Anxiety  Chronic problem. Started Prozac, increased the dose to 20 mg daily and doing well, requesting refills today.     Nausea  Chronic issue. Pt reports improvement, however still experiencing intermittent , mild nausea w/out vomiting.    S/P mitral valve repair  Doing well, scar tissue healed. No pain.      ROS   Denies any recent fevers or chills. No nausea or vomiting. No chest pains or shortness of breath.     Allergies   Allergen Reactions   • Keflex      Pt unsure of reaction; \"its been years\"       Current medicines (including changes today)  Current Outpatient Medications   Medication Sig Dispense Refill   • FLUoxetine (PROZAC) 20 MG Cap Take 1 Cap by mouth every day. 30 Cap 2   • amiodarone (CORDARONE) 200 MG Tab Take 1 Tab by mouth every day. 30 Tab 6   • metoprolol (LOPRESSOR) 25 MG Tab Take 0.5 Tabs by mouth 2 Times a Day. 60 Tab 3   • warfarin (COUMADIN) 5 MG Tab Take 1 Tab by mouth every day at 6 PM. 30 Tab 3   • Magnesium Gluconate 250 MG Tab Take 1 Tab by mouth 2 Times a Day. (Patient not taking: Reported on 8/4/2020) 180 Tab 1   • aspirin EC (ECOTRIN) 81 MG Tablet Delayed Response Take 81 mg by mouth every day.       No current facility-administered medications for this visit.        Patient Active Problem List    Diagnosis Date Noted   • Severe mitral regurgitation 05/07/2020     Priority: High   • Constipation 07/21/2020   • Hospital discharge follow-up 07/02/2020   • S/P mitral valve repair 06/25/2020   • Anxiety 05/29/2020   • Mixed hyperlipidemia 05/15/2020   • Atrial fibrillation (HCC) 05/07/2020   • Nausea 05/07/2020   • Other " "hemorrhoids 04/21/2020       Family History   Problem Relation Age of Onset   • Lung Disease Mother    • Alzheimer's Disease Father    • Diabetes Maternal Grandmother        She  has a past medical history of Arrhythmia, Heart murmur (2019), Heart valve disease, Hyperlipidemia, Kidney stone, HEATHER (obstructive sleep apnea), and Pneumonia (2015).  She  has a past surgical history that includes bladder neck contracture exicision; lithotripsy (2018); pr replacement of mitral valve (6/17/2020); and crista (6/17/2020).       Objective:   Vitals obtained by patient:  /57 (BP Location: Left arm, Patient Position: Sitting, BP Cuff Size: Adult)   Pulse 68   Ht 1.702 m (5' 7\")   Wt 56.2 kg (124 lb)   BMI 19.42 kg/m²      Physical Exam:  General: No acute distress. Well nourished.   HEENT: EOM grossly intact, no scleral icterus, no pharyngeal erythema.   Neck:  No JVD noted at 90 degrees, trachea midline  CVS: Pulse as reported by patient, no visible LE edema.  Resp: Unlabored respiratory effort, no cough or audible wheeze  MSK/Ext: No clubbing or cyanosis visible appreciated.  Skin: No rashes in visible areas.  Neurological: AOx3. CN III-XII grossly intact. No focal deficits.     Patient was seen for 25 minutes face to face of which > 50% of appointment time was spent on counseling and coordination of care regarding the above.   Assessment and Plan:   1. Anxiety  Improving on current medication and dose, refills given.  - FLUoxetine (PROZAC) 20 MG Cap; Take 1 Cap by mouth every day.  Dispense: 30 Cap; Refill: 2    2. Nausea  Uncontrolled, stable. Recommend frequent small meals, also try mint and or rohan tea. Pt declines Rx for nausea stating it is not that bad. Will f/u in 4 wks.    3. S/P mitral valve repair  Stable       Follow-up: Return if symptoms worsen or fail to improve.          "

## 2020-08-06 NOTE — ASSESSMENT & PLAN NOTE
Chronic issue. Pt reports improvement, however still experiencing intermittent , mild nausea w/out vomiting.

## 2020-08-06 NOTE — ASSESSMENT & PLAN NOTE
Chronic problem. Started Prozac, increased the dose to 20 mg daily and doing well, requesting refills today.

## 2020-08-07 LAB — LV EJECT FRACT  99904: 50

## 2020-08-10 ENCOUNTER — ANTICOAGULATION MONITORING (OUTPATIENT)
Dept: MEDICAL GROUP | Facility: PHYSICIAN GROUP | Age: 75
End: 2020-08-10

## 2020-08-10 DIAGNOSIS — Z98.890 S/P MITRAL VALVE REPAIR: ICD-10-CM

## 2020-08-10 DIAGNOSIS — I48.91 ATRIAL FIBRILLATION, UNSPECIFIED TYPE (HCC): ICD-10-CM

## 2020-08-10 LAB — INR PPP: 1.6 (ref 2–3.5)

## 2020-08-10 NOTE — PROGRESS NOTES
Anticoagulation Summary  As of 8/10/2020    INR goal:  2.0-3.0   TTR:  26.9 % (1.2 mo)   INR used for dosin.60 (8/10/2020)   Warfarin maintenance plan:  7.5 mg (5 mg x 1.5) every Mon; 5 mg (5 mg x 1) all other days   Weekly warfarin total:  37.5 mg   Plan last modified:  Petr Ariza PharmD (8/10/2020)   Next INR check:  2020   Target end date:      Indications    Atrial fibrillation (HCC) [I48.91]  S/P mitral valve repair [Z98.890]             Anticoagulation Episode Summary     INR check location:      Preferred lab:      Send INR reminders to:      Comments:  Shruti DOVE [phone]      Anticoagulation Care Providers     Provider Role Specialty Phone number    PARK Baez Referring Family Medicine [phone]    Margy Dunbar P.A.-C. Referring Cardiology [phone]    [facility] Anticoagulation Services Responsible  [phone]        Anticoagulation Patient Findings      Spoke to patient on the phone.   INR  sub-therapeutic.   Denies signs/symptoms of bleeding and/or thrombosis.   Denies changes to diet or medications.   Follow up appointment in 1 week(s).    7.5mg today then ontinue weekly warfarin dose as noted    Faxed     Petr Ariza PharmD, MS, BCACP, C    This note was created using voice recognition software (Dragon). The accuracy of the dictation is limited by the abilities of the software. I have reviewed the note prior to signing, however some errors in grammar and context are still possible. If you have any questions related to this note please do not hesitate to contact our office.

## 2020-08-19 ENCOUNTER — TELEPHONE (OUTPATIENT)
Dept: VASCULAR LAB | Facility: MEDICAL CENTER | Age: 75
End: 2020-08-19

## 2020-08-19 NOTE — TELEPHONE ENCOUNTER
Pt left VM indicating that HH RN was unable to come out to see her.   No issues with warfarin at this time.   Has Home Monitor training on Friday and will self report INR then.     Galina Cardenas, AkbarD

## 2020-08-21 ENCOUNTER — ANTICOAGULATION MONITORING (OUTPATIENT)
Dept: VASCULAR LAB | Facility: MEDICAL CENTER | Age: 75
End: 2020-08-21

## 2020-08-21 DIAGNOSIS — Z98.890 S/P MITRAL VALVE REPAIR: ICD-10-CM

## 2020-08-21 DIAGNOSIS — I48.91 ATRIAL FIBRILLATION, UNSPECIFIED TYPE (HCC): ICD-10-CM

## 2020-08-21 LAB — INR PPP: 2.2 (ref 2–3.5)

## 2020-08-21 NOTE — PROGRESS NOTES
OP   Telephone Anticoagulation Service Note      Anticoagulation Summary  As of 2020    INR goal:  2.0-3.0   TTR:  28.4 % (1.6 mo)   INR used for dosin.20 (2020)   Warfarin maintenance plan:  5 mg (5 mg x 1) every day   Weekly warfarin total:  35 mg   Plan last modified:  Janet Castro (2020)   Next INR check:  2020   Target end date:      Indications    Atrial fibrillation (HCC) [I48.91]  S/P mitral valve repair [Z98.890]             Anticoagulation Episode Summary     INR check location:  Home Draw    Preferred lab:      Send INR reminders to:      Comments:  Jessenia        Anticoagulation Care Providers     Provider Role Specialty Phone number    PARK Baez Referring Family Medicine [phone]    Margy Dunbar P.A.-C. Referring Cardiology [phone]    [facility] Anticoagulation Services Responsible  [phone]        Anticoagulation Patient Findings  Patient Findings     Negatives:  Signs/symptoms of thrombosis, Signs/symptoms of bleeding, Laboratory test error suspected, Change in health, Change in alcohol use, Change in activity, Upcoming invasive procedure, Emergency department visit, Upcoming dental procedure, Missed doses, Extra doses, Change in medications, Change in diet/appetite, Hospital admission, Bruising, Other complaints    Comments:  Not taking dose of 7.5mg on .        Spoke with the patient on the phone today, reporting a therapeutic INR of 2.2. Confirmed the current warfarin dosing regimen and patient compliance. Patient reports she misunderstood and went back to 5mg QD since last test. Will make correction to dosing calendar.   Patient denies any interval changes to diet and/or medications. Patient denies any signs/symptoms of bleeding or clotting.  Patient instructed to continue with the current warfarin dosing regimen, and asked to follow up again in 1 week.     Anastasiya Castro  PharmD

## 2020-08-25 ENCOUNTER — PATIENT MESSAGE (OUTPATIENT)
Dept: MEDICAL GROUP | Facility: PHYSICIAN GROUP | Age: 75
End: 2020-08-25

## 2020-08-25 DIAGNOSIS — F41.9 ANXIETY: ICD-10-CM

## 2020-08-25 NOTE — TELEPHONE ENCOUNTER
----- Message from Frances Sanchez sent at 8/25/2020  3:11 PM PDT -----  Regarding: Prescription Question  Contact: [phone]  Hi   Will you be ordering more Prozac for me?  Thank you, Frances

## 2020-08-26 RX ORDER — FLUOXETINE HYDROCHLORIDE 20 MG/1
20 CAPSULE ORAL DAILY
Qty: 30 CAP | Refills: 2 | Status: SHIPPED | OUTPATIENT
Start: 2020-08-26 | End: 2021-01-11

## 2020-08-28 ENCOUNTER — ANTICOAGULATION MONITORING (OUTPATIENT)
Dept: VASCULAR LAB | Facility: MEDICAL CENTER | Age: 75
End: 2020-08-28

## 2020-08-28 DIAGNOSIS — I48.91 ATRIAL FIBRILLATION, UNSPECIFIED TYPE (HCC): ICD-10-CM

## 2020-08-28 DIAGNOSIS — Z98.890 S/P MITRAL VALVE REPAIR: ICD-10-CM

## 2020-08-28 LAB — INR PPP: 2 (ref 2–3.5)

## 2020-08-28 NOTE — PROGRESS NOTES
Anticoagulation Summary  As of 2020    INR goal:  2.0-3.0   TTR:  37.7 % (1.8 mo)   INR used for dosin.00 (2020)   Warfarin maintenance plan:  5 mg (5 mg x 1) every day   Weekly warfarin total:  35 mg   Plan last modified:  Petr Ariza PharmD (2020)   Next INR check:  2020   Target end date:      Indications    Atrial fibrillation (HCC) [I48.91]  S/P mitral valve repair [Z98.890]             Anticoagulation Episode Summary     INR check location:  Home Draw    Preferred lab:      Send INR reminders to:      Comments:  Jessenia        Anticoagulation Care Providers     Provider Role Specialty Phone number    PARK Baez Referring Family Medicine [phone]    Margy Dunbar P.A.-C. Referring Cardiology [phone]    [facility] Anticoagulation Services Responsible  [phone]        Anticoagulation Patient Findings        Spoke to patient on the phone.   INR  therapeutic.   Denies signs/symptoms of bleeding and/or thrombosis.   Denies changes to diet or medications.   Follow up appointment in 1 week(s).    Continue weekly warfarin dose as noted      Petr Ariza, PharmD, MS, BCACP, LCC    This note was created using voice recognition software (Dragon). The accuracy of the dictation is limited by the abilities of the software. I have reviewed the note prior to signing, however some errors in grammar and context are still possible. If you have any questions related to this note please do not hesitate to contact our office.

## 2020-09-04 ENCOUNTER — ANTICOAGULATION MONITORING (OUTPATIENT)
Dept: VASCULAR LAB | Facility: MEDICAL CENTER | Age: 75
End: 2020-09-04

## 2020-09-04 DIAGNOSIS — Z98.890 S/P MITRAL VALVE REPAIR: ICD-10-CM

## 2020-09-04 DIAGNOSIS — I48.91 ATRIAL FIBRILLATION, UNSPECIFIED TYPE (HCC): ICD-10-CM

## 2020-09-04 LAB — INR PPP: 2.2 (ref 2–3.5)

## 2020-09-04 NOTE — PROGRESS NOTES
Anticoagulation Summary  As of 2020    INR goal:  2.0-3.0   TTR:  44.8 % (2 mo)   INR used for dosin.20 (2020)   Warfarin maintenance plan:  5 mg (5 mg x 1) every day   Weekly warfarin total:  35 mg   Plan last modified:  Petr Ariza, PharmD (2020)   Next INR check:  2020   Target end date:      Indications    Atrial fibrillation (HCC) [I48.91]  S/P mitral valve repair [Z98.890]             Anticoagulation Episode Summary     INR check location:  Home Draw    Preferred lab:      Send INR reminders to:      Comments:  Jessenia        Anticoagulation Care Providers     Provider Role Specialty Phone number    PARK Baez Referring Family Medicine [phone]    Margy Dunbar P.A.-C. Referring Cardiology [phone]    [facility] Anticoagulation Services Responsible  [phone]        Anticoagulation Patient Findings      Spoke with patient to report a therapeutic INR.    Pt instructed to continue with current warfarin dosing regimen, confirms dosing.   Pt denies any s/s of bleeding, bruising, clotting or any changes to diet or medication.    Will follow up in 2 week(s).     Galina Cardenas, AkbarD

## 2020-09-18 ENCOUNTER — ANTICOAGULATION MONITORING (OUTPATIENT)
Dept: VASCULAR LAB | Facility: MEDICAL CENTER | Age: 75
End: 2020-09-18

## 2020-09-18 DIAGNOSIS — Z98.890 S/P MITRAL VALVE REPAIR: ICD-10-CM

## 2020-09-18 DIAGNOSIS — I48.91 ATRIAL FIBRILLATION, UNSPECIFIED TYPE (HCC): ICD-10-CM

## 2020-09-18 LAB — INR PPP: 2.2 (ref 2–3.5)

## 2020-09-18 NOTE — PROGRESS NOTES
Anticoagulation Summary  As of 2020    INR goal:  2.0-3.0   TTR:  55.1 % (2.5 mo)   INR used for dosin.20 (2020)   Warfarin maintenance plan:  5 mg (5 mg x 1) every day   Weekly warfarin total:  35 mg   Plan last modified:  Petr Ariza, PharmD (2020)   Next INR check:  10/2/2020   Target end date:      Indications    Atrial fibrillation (HCC) [I48.91]  S/P mitral valve repair [Z98.890]             Anticoagulation Episode Summary     INR check location:  Home Draw    Preferred lab:      Send INR reminders to:      Comments:  [facility]        Anticoagulation Care Providers     Provider Role Specialty Phone number    PARK Baez Referring Family Medicine [phone]    Margy Dunbar P.A.-C. Referring Cardiology [phone]    [facility] Anticoagulation Services Responsible  [phone]        Anticoagulation Patient Findings      Spoke with patient to report a therapeutic INR.    Pt instructed to continue with current warfarin dosing regimen, confirms dosing.   Pt denies any s/s of bleeding, bruising, clotting or any changes to diet or medication.    Will follow up in 2 week(s).     Carina Crisostomo, Pharmacy Intern

## 2020-10-02 ENCOUNTER — ANTICOAGULATION MONITORING (OUTPATIENT)
Dept: VASCULAR LAB | Facility: MEDICAL CENTER | Age: 75
End: 2020-10-02

## 2020-10-02 DIAGNOSIS — Z98.890 S/P MITRAL VALVE REPAIR: ICD-10-CM

## 2020-10-02 LAB — INR PPP: 2.1 (ref 2–3.5)

## 2020-10-02 NOTE — PROGRESS NOTES
Anticoagulation Summary  As of 10/2/2020    INR goal:  2.0-3.0   TTR:  62.2 % (3 mo)   INR used for dosin.10 (10/2/2020)   Warfarin maintenance plan:  5 mg (5 mg x 1) every day   Weekly warfarin total:  35 mg   Plan last modified:  Petr Ariza, PharmD (2020)   Next INR check:  10/16/2020   Target end date:      Indications    Atrial fibrillation (HCC) [I48.91]  S/P mitral valve repair [Z98.890]             Anticoagulation Episode Summary     INR check location:  Home Draw    Preferred lab:      Send INR reminders to:      Comments:  [facility]        Anticoagulation Care Providers     Provider Role Specialty Phone number    PARK Baez Referring Family Medicine [phone]    Margy Dunbar P.A.-C. Referring Cardiology [phone]    [facility] Anticoagulation Services Responsible  [phone]        Anticoagulation Patient Findings          Left voicemail message to report a therapeutic INR of 2.1.  Pt to continue with current warfarin dosing regimen. Requested pt contact the clinic for any s/s of unusual bleeding, bruising, clotting or any changes to diet or medication. FU 2 weeks.    Grant Desir, AkbarD

## 2020-10-08 ENCOUNTER — TELEPHONE (OUTPATIENT)
Dept: CARDIOTHORACIC SURGERY | Facility: MEDICAL CENTER | Age: 75
End: 2020-10-08

## 2020-10-08 NOTE — TELEPHONE ENCOUNTER
Patient called with question on getting a tooth implant, if she needed clearance from our surgoen to have the periodontist perform this.     She was discharged Jun 24th; is more than 3 months out from heart surgery; she does not need clearance, just the endocarditis prevention with antibiotics.    She verbalized understanding she would need this prior to the procedure.    Call time 3 minutes.

## 2020-10-16 ENCOUNTER — ANTICOAGULATION MONITORING (OUTPATIENT)
Dept: VASCULAR LAB | Facility: MEDICAL CENTER | Age: 75
End: 2020-10-16

## 2020-10-16 DIAGNOSIS — Z98.890 S/P MITRAL VALVE REPAIR: ICD-10-CM

## 2020-10-16 DIAGNOSIS — I48.91 ATRIAL FIBRILLATION, UNSPECIFIED TYPE (HCC): ICD-10-CM

## 2020-10-16 LAB — INR PPP: 2.2 (ref 2–3.5)

## 2020-10-19 NOTE — PROGRESS NOTES
OP Telephone Anticoagulation Service Note    Date: 10/19/2020      Anticoagulation Summary  As of 10/16/2020    INR goal:  2.0-3.0   TTR:  67.3 % (3.4 mo)   INR used for dosin.20 (10/16/2020)   Warfarin maintenance plan:  5 mg (5 mg x 1) every day   Weekly warfarin total:  35 mg   Plan last modified:  Akbar PyleD (2020)   Next INR check:  10/30/2020   Target end date:  Indefinite    Indications    Atrial fibrillation (HCC) [I48.91]  S/P mitral valve repair [Z98.890]             Anticoagulation Episode Summary     INR check location:  Home Draw    Preferred lab:      Send INR reminders to:      Comments:  [facility]        Anticoagulation Care Providers     Provider Role Specialty Phone number    PARK Baez Referring Family Medicine [phone]    Margy Dunbar P.A.-C. Referring Cardiology [phone]    [facility] Anticoagulation Services Responsible  [phone]        Anticoagulation Patient Findings        Plan: INR is in range. Left message on patient's answering machine/voicemail. Instructed patient to call back with any concerns regarding any unusual bleeding or bruising, any medication or diet changes or any signs or symptoms of thrombosis. Instructed patient to resume medication as outlined above. Patient to follow up in 2 week(s).             Julissa Fox, PharmD

## 2020-10-24 NOTE — PROGRESS NOTES
Subjective:   Chief Complaint:   Chief Complaint   Patient presents with   • Atrial Fibrillation       This evaluation was conducted via Zoom using secure and encrypted videoconferencing technology. The patient was in a private location in the state of [place].    The patient's identity was confirmed and verbal consent was obtained for this virtual visit.      Frances Sanchez is a 75 y.o. female who returns for PAF, mitral valve prolapse/severe MR, s/p MV repair, SUSHMA ligation/excision, amiodarone therapy.    Was having daily nausea, this is gone, DAWKINS, depression, poor appetite, lots of problems.  Echocardiogram 5/7/2020 with prolapse of the posterior leaflet with moderate to severe mitral regurgitation, RVSP 35.    Underwent radical MV repair/P2 resection, 34 mm Montoya annuloplasty ring, SUSHMA ligation/excision.  Had post op afib, sent home on amiodarone.  3 months warfarin planned.    She is doing well, feels 80% herself overall.    Previously had paroxysmal atrial fibrillation around 2014 at [facility], last seen by them November 2019. Dr. Samayoa, wore a monitor in 2019.  Chads 2 vascular score of 2, 2, previously suggested Xarelto, but then was on ASA, is not taking it until 3 days ago, takes with milk, did not tolerate   Has had tinnitus, not new, not related to ASA.    Has hyperlipidemia, .  Has vitamin D deficiency, back in normal range.  Has had elevated A1c in the past but not recent.    She likes to walk, 2-4 miles per day. Will use a stationary bike in the cold weather.  She is not limited by chest pain, pressure or tightness with activity.   No significant dyspnea on exertion, orthopnea or lower extremity swelling.   No significant palpitations or presyncope/syncope.   No symptoms of leg claudication.   No stroke/TIA like symptoms.     Some mild lightheadedness but not limiting.    No family history of premature coronary artery disease.  No prior smoking history.  No history of diabetes.  No history  of hypertension. Checking BP at home.  No history of autoimmune disease such as lupus or rheumatoid arthritis.  No chronic kidney disease.    Here with her , Aleksandar.  They are recently .    DATA REVIEWED by me:  ECG 6-  Sinus, 74, PACs, delayed R wave progression, T wave inversion, consider ischemia    ECG 5/7/2020  Sinus, 77, borderline anterior ST depression    Echo 5/7/2020  No prior study is available for comparison.   Left ventricular ejection fraction is visually estimated to be 65%.  Normal regional wall motion.  The left atrium is normal in size.  Prolapse of the posterior mitral leaflet was present.  Moderate appearing, likely severe anterior directed  mitral   regurgitation.  Estimated right ventricular systolic pressure  is 35 mmHg.    [facility] 5-  Pre-operative Diagnosis:  Symptomatic severe mitral regurgitation     Post-operative Diagnosis:   1. No evidence of coronary artery disease  2. 4+ mitral regurgitation  3. Normal left ventricular systolic function. EF 70%  4. Normal sized aortic root without significant aortic regurgitation    Open heart surgery Dr. Bryan 6-  Procedure(s):  RADICAL MITRAL VALVE REPAIR (P2 triangular resection including ruptured chord, 34 mm Montoya flexible annuloplasty band)  LEFT ATRIAL APPENDAGE LIGATION/EXCISION   Primary cord that was preserved was reimplanted in   the repair.   The left atrial appendage was ligated at   its base and excised.  The stump was oversewn in 2 layers using #4-0 Prolene   sutures.  A left atriotomy was performed just below the interatrial groove.      Most recent labs:       Lab Results   Component Value Date/Time    HEMOGLOBIN 9.6 (L) 06/20/2020 02:30 AM    HEMATOCRIT 29.3 (L) 06/20/2020 02:30 AM    MCV 92.7 06/20/2020 02:30 AM    INR 2.20 10/16/2020      Lab Results   Component Value Date/Time    SODIUM 135 06/22/2020 07:27 AM    POTASSIUM 4.2 06/22/2020 07:27 AM    CHLORIDE 95 (L) 06/22/2020 07:27 AM    CO2 28  06/22/2020 07:27 AM    GLUCOSE 133 (H) 06/22/2020 07:27 AM    BUN 12 06/22/2020 07:27 AM    CREATININE 0.72 06/22/2020 07:27 AM      Lab Results   Component Value Date/Time    ASTSGOT 18 06/16/2020 11:00 AM    ALTSGPT 16 06/16/2020 11:00 AM    ALBUMIN 4.2 06/16/2020 11:00 AM      Lab Results   Component Value Date/Time    CHOLSTRLTOT 213 (H) 05/07/2020 11:55 AM     (H) 05/07/2020 11:55 AM    HDL 61 05/07/2020 11:55 AM    TRIGLYCERIDE 97 05/07/2020 11:55 AM           Past Medical History:   Diagnosis Date   • Arrhythmia     hx atrial fibrillation   • Heart murmur 2019    mitral valve prolapse   • Heart valve disease    • Hyperlipidemia    • Kidney stone     hx   • HEATHER (obstructive sleep apnea)     pt denies    • Pneumonia 2015     Past Surgical History:   Procedure Laterality Date   • PB REPLACEMENT OF MITRAL VALVE  6/17/2020    Procedure: MITRAL VALVE REPAIR;  Surgeon: Jeronimo Bryan M.D.;  Location: SURGERY [facility];  Service: Cardiothoracic   • BRIGID  6/17/2020    Procedure: ECHOCARDIOGRAM, TRANSESOPHAGEAL;  Surgeon: Jeronimo Bryan M.D.;  Location: SURGERY [facility];  Service: Cardiothoracic   • LITHOTRIPSY  2018   • BLADDER NECK CONTRACTURE EXICISION       Family History   Problem Relation Age of Onset   • Lung Disease Mother    • Alzheimer's Disease Father    • Diabetes Maternal Grandmother      Social History     Socioeconomic History   • Marital status:      Spouse name: Not on file   • Number of children: Not on file   • Years of education: Not on file   • Highest education level: Not on file   Occupational History   • Not on file   Social Needs   • Financial resource strain: Not on file   • Food insecurity     Worry: Not on file     Inability: Not on file   • Transportation needs     Medical: Not on file     Non-medical: Not on file   Tobacco Use   • Smoking status: Never Smoker   • Smokeless tobacco: Never Used   Substance and Sexual Activity   • Alcohol use: No   • Drug use:  "Never   • Sexual activity: Yes     Partners: Male   Lifestyle   • Physical activity     Days per week: Not on file     Minutes per session: Not on file   • Stress: Not on file   Relationships   • Social connections     Talks on phone: Not on file     Gets together: Not on file     Attends Nondenominational service: Not on file     Active member of club or organization: Not on file     Attends meetings of clubs or organizations: Not on file     Relationship status: Not on file   • Intimate partner violence     Fear of current or ex partner: Not on file     Emotionally abused: Not on file     Physically abused: Not on file     Forced sexual activity: Not on file   Other Topics Concern   • Not on file   Social History Narrative   • Not on file     Allergies   Allergen Reactions   • Keflex      Pt unsure of reaction; \"its been years\"       Current Outpatient Medications   Medication Sig Dispense Refill   • warfarin (COUMADIN) 5 MG Tab Take 1 Tab by mouth every day at 6 PM. 90 Tab 1   • FLUoxetine (PROZAC) 20 MG Cap Take 1 Cap by mouth every day. 30 Cap 2     No current facility-administered medications for this visit.        ROS    All others systems reviewed and negative.     Objective:     /65 (BP Location: Left arm, Patient Position: Sitting, BP Cuff Size: Adult)   Ht 1.702 m (5' 7\")   Wt 56.7 kg (125 lb)  Body mass index is 19.58 kg/m².      Vitals obtained by patient:    Physical Exam:  General: No acute distress. Well nourished.   HEENT: EOM grossly intact, no scleral icterus, no pharyngeal erythema.   Neck:  No JVD noted at 90 degrees, trachea midline  CVS: Pulse as reported by patient, no visible LE edema. Well healed midline incision.  Resp: Unlabored respiratory effort, no cough or audible wheeze  MSK/Ext: No clubbing or cyanosis visible appreciated.  Skin: No rashes in visible areas.  Neurological: CN III-XII grossly intact. No focal deficits.   Psych: A&O x 3, appropriate affect, good judgement, well " groomed      Assessment:     1. High risk medication use     2. Mixed hyperlipidemia     3. Severe mitral regurgitation  EC-ECHOCARDIOGRAM COMPLETE W/O CONT   4. S/P mitral valve repair  EC-ECHOCARDIOGRAM COMPLETE W/O CONT   5. Paroxysmal atrial fibrillation (HCC)  ZIO PATCH MONITOR   6. Mitral prolapse     7. Pure hypercholesterolemia     8. Other secondary pulmonary hypertension (HCC)     9. DAWKINS (dyspnea on exertion)     10. Palpitations     11. Lightheaded     12. On amiodarone therapy         Medical Decision Making:  Today's Assessment / Status / Plan:     -Symptomatic severe MR from MVP, now doing really well after surgery, her life is getting better, no more nausea, better energy, no DAWKINS.  -On metoprolol for afib, now post op afib, on amio. Time to stop amio. She would like to try off of metoprolol also. I suspect she will need some AV node blocker, probably switch her to CCB, cardizem because of hair loss/thinning.  -She will stay on warfarin, if she has recurrence of afib, she will be back on this so I want her to stay on this for now. If no more afib, then change to ASA 81 mg.  -Discussed dental work and ABx, discussed guidelines ok for no Abx after 6 months but she prefers to cont to take these.  -No Sign CAD  -Eleveted LDL, no statin at this time  -BP has been ok  -RTC 2 months, virtual    Written instructions given today:      -Stop amiodarone, it can take a few weeks for it to get out of your system.    -Stop metoprolol     -Zio patch heart monitor in 4 weeks, wear it for 2 weeks to look for return of afib    -I want you to stay on the warfarin for now until I know you are not having more afib.    -If we do not see more afib, we will stop your warfarin and change you over to an ASA 81 mg.     -OTC Biotin for hair    -If your heart starts racing again try to capture afib on your watch or [employer] mobile manjula.    Consider [employer] Mate mobile manjula if your apple watch is not detecting afib.    -Echocardiogram- heart  pictures to look at the heart structure and pump function. This is to establish information about your valve repair for a new baseline.    Return in about 2 months (around 12/29/2020).    It is my pleasure to participate in the care of Ms. Sanchez.  Please do not hesitate to contact me with questions or concerns.    Roshni Gimenez MD, [facility]  Cardiologist [facility]    Please note that this dictation was created using voice recognition software. I have made every reasonable attempt to correct obvious errors, but it is possible there are errors of grammar and possibly content that I did not discover before finalizing the note.

## 2020-10-27 DIAGNOSIS — I48.91 ATRIAL FIBRILLATION, UNSPECIFIED TYPE (HCC): ICD-10-CM

## 2020-10-27 RX ORDER — WARFARIN SODIUM 5 MG/1
5 TABLET ORAL DAILY
Qty: 90 TAB | Refills: 1 | Status: SHIPPED | OUTPATIENT
Start: 2020-10-27 | End: 2021-02-11

## 2020-10-29 ENCOUNTER — TELEMEDICINE (OUTPATIENT)
Dept: CARDIOLOGY | Facility: MEDICAL CENTER | Age: 75
End: 2020-10-29
Payer: MEDICARE

## 2020-10-29 VITALS
WEIGHT: 125 LBS | HEIGHT: 67 IN | BODY MASS INDEX: 19.62 KG/M2 | DIASTOLIC BLOOD PRESSURE: 65 MMHG | SYSTOLIC BLOOD PRESSURE: 122 MMHG

## 2020-10-29 DIAGNOSIS — Z79.899 HIGH RISK MEDICATION USE: ICD-10-CM

## 2020-10-29 DIAGNOSIS — I34.1 MITRAL PROLAPSE: ICD-10-CM

## 2020-10-29 DIAGNOSIS — E78.00 PURE HYPERCHOLESTEROLEMIA: ICD-10-CM

## 2020-10-29 DIAGNOSIS — Z98.890 S/P MITRAL VALVE REPAIR: ICD-10-CM

## 2020-10-29 DIAGNOSIS — I48.0 PAROXYSMAL ATRIAL FIBRILLATION (HCC): ICD-10-CM

## 2020-10-29 DIAGNOSIS — Z79.899 ON AMIODARONE THERAPY: ICD-10-CM

## 2020-10-29 DIAGNOSIS — E78.2 MIXED HYPERLIPIDEMIA: ICD-10-CM

## 2020-10-29 DIAGNOSIS — R42 LIGHTHEADED: ICD-10-CM

## 2020-10-29 DIAGNOSIS — I27.29 OTHER SECONDARY PULMONARY HYPERTENSION (HCC): ICD-10-CM

## 2020-10-29 DIAGNOSIS — I34.0 SEVERE MITRAL REGURGITATION: ICD-10-CM

## 2020-10-29 DIAGNOSIS — R06.09 DOE (DYSPNEA ON EXERTION): ICD-10-CM

## 2020-10-29 DIAGNOSIS — R00.2 PALPITATIONS: ICD-10-CM

## 2020-10-29 PROCEDURE — 99214 OFFICE O/P EST MOD 30 MIN: CPT | Mod: 95,CR | Performed by: INTERNAL MEDICINE

## 2020-10-29 ASSESSMENT — FIBROSIS 4 INDEX: FIB4 SCORE: 4.06

## 2020-10-29 NOTE — PATIENT INSTRUCTIONS
-Stop amiodarone, it can take a few weeks for it to get out of your system.    -Stop metoprolol     -Zio patch heart monitor in 4 weeks, wear it for 2 weeks to look for return of afib    -I want you to stay on the warfarin for now until I know you are not having more afib.    -If we do not see more afib, we will stop your warfarin and change you over to an ASA 81 mg.     -OTC Biotin for hair    -If your heart starts racing again try to capture afib on your watch or Wipit mobile ninfa.    Consider Wipit mobile ninfa if your apple watch is not detecting afib.    For monitoring your palpitations you can consider the Wipit Ninfa with [employer] device for you smartphone, you can save a PDF copy and email it to me via [place].          You can learn more about it at this website:    [email or web address].Jambo/    Can be purchased online, any retailer. $89.    -Echocardiogram- heart pictures to look at the heart structure and pump function. This is to establish information about your valve repair for a new baseline.

## 2020-10-29 NOTE — LETTER
[facility]   [address]   [address]  [place] [address]  Phone: [phone]  Fax: [phone]              Frances Sanchez  1945    Encounter Date: 10/29/2020    Roshni Gimenez M.D.          PROGRESS NOTE:  Subjective:   Chief Complaint:   Chief Complaint   Patient presents with   • Atrial Fibrillation       This evaluation was conducted via Zoom using secure and encrypted videoconferencing technology. The patient was in a private location in the [facility].    The patient's identity was confirmed and verbal consent was obtained for this virtual visit.      Frances Sanchez is a 75 y.o. female who returns for PAF, mitral valve prolapse/severe MR, s/p MV repair, SUSHMA ligation/excision, amiodarone therapy.    Was having daily nausea, this is gone, DAWKINS, depression, poor appetite, lots of problems.  Echocardiogram 5/7/2020 with prolapse of the posterior leaflet with moderate to severe mitral regurgitation, RVSP 35.    Underwent radical MV repair/P2 resection, 34 mm Montoya annuloplasty ring, SUSHMA ligation/excision.  Had post op afib, sent home on amiodarone.  3 months warfarin planned.    She is doing well, feels 80% herself overall.    Previously had paroxysmal atrial fibrillation around 2014 at [facility], last seen by them November 2019. Dr. Samayoa, wore a monitor in 2019.  Chads 2 vascular score of 2, 2, previously suggested Xarelto, but then was on ASA, is not taking it until 3 days ago, takes with milk, did not tolerate   Has had tinnitus, not new, not related to ASA.    Has hyperlipidemia, .  Has vitamin D deficiency, back in normal range.  Has had elevated A1c in the past but not recent.    She likes to walk, 2-4 miles per day. Will use a stationary bike in the cold weather.  She is not limited by chest pain, pressure or tightness with activity.   No significant dyspnea on exertion, orthopnea or lower extremity swelling.    No significant palpitations or presyncope/syncope.   No symptoms of leg claudication.   No stroke/TIA like symptoms.     Some mild lightheadedness but not limiting.    No family history of premature coronary artery disease.  No prior smoking history.  No history of diabetes.  No history of hypertension. Checking BP at home.  No history of autoimmune disease such as lupus or rheumatoid arthritis.  No chronic kidney disease.    Here with her , Aleksandar.  They are recently .    DATA REVIEWED by me:  ECG 6-  Sinus, 74, PACs, delayed R wave progression, T wave inversion, consider ischemia    ECG 5/7/2020  Sinus, 77, borderline anterior ST depression    Echo 5/7/2020  No prior study is available for comparison.   Left ventricular ejection fraction is visually estimated to be 65%.  Normal regional wall motion.  The left atrium is normal in size.  Prolapse of the posterior mitral leaflet was present.  Moderate appearing, likely severe anterior directed  mitral   regurgitation.  Estimated right ventricular systolic pressure  is 35 mmHg.    [facility] 5-  Pre-operative Diagnosis:  Symptomatic severe mitral regurgitation     Post-operative Diagnosis:   1. No evidence of coronary artery disease  2. 4+ mitral regurgitation  3. Normal left ventricular systolic function. EF 70%  4. Normal sized aortic root without significant aortic regurgitation    Open heart surgery Dr. Bryan 6-  Procedure(s):  RADICAL MITRAL VALVE REPAIR (P2 triangular resection including ruptured chord, 34 mm Montoya flexible annuloplasty band)  LEFT ATRIAL APPENDAGE LIGATION/EXCISION   Primary cord that was preserved was reimplanted in   the repair.   The left atrial appendage was ligated at   its base and excised.  The stump was oversewn in 2 layers using #4-0 Prolene   sutures.  A left atriotomy was performed just below the interatrial groove.      Most recent labs:       Lab Results   Component Value Date/Time    HEMOGLOBIN 9.6  (L) 06/20/2020 02:30 AM    HEMATOCRIT 29.3 (L) 06/20/2020 02:30 AM    MCV 92.7 06/20/2020 02:30 AM    INR 2.20 10/16/2020      Lab Results   Component Value Date/Time    SODIUM 135 06/22/2020 07:27 AM    POTASSIUM 4.2 06/22/2020 07:27 AM    CHLORIDE 95 (L) 06/22/2020 07:27 AM    CO2 28 06/22/2020 07:27 AM    GLUCOSE 133 (H) 06/22/2020 07:27 AM    BUN 12 06/22/2020 07:27 AM    CREATININE 0.72 06/22/2020 07:27 AM      Lab Results   Component Value Date/Time    ASTSGOT 18 06/16/2020 11:00 AM    ALTSGPT 16 06/16/2020 11:00 AM    ALBUMIN 4.2 06/16/2020 11:00 AM      Lab Results   Component Value Date/Time    CHOLSTRLTOT 213 (H) 05/07/2020 11:55 AM     (H) 05/07/2020 11:55 AM    HDL 61 05/07/2020 11:55 AM    TRIGLYCERIDE 97 05/07/2020 11:55 AM           Past Medical History:   Diagnosis Date   • Arrhythmia     hx atrial fibrillation   • Heart murmur 2019    mitral valve prolapse   • Heart valve disease    • Hyperlipidemia    • Kidney stone     hx   • HEATHER (obstructive sleep apnea)     pt denies    • Pneumonia 2015     Past Surgical History:   Procedure Laterality Date   • PB REPLACEMENT OF MITRAL VALVE  6/17/2020    Procedure: MITRAL VALVE REPAIR;  Surgeon: Jeronimo Bryan M.D.;  Location: [facility];  Service: Cardiothoracic   • BRIGID  6/17/2020    Procedure: ECHOCARDIOGRAM, TRANSESOPHAGEAL;  Surgeon: Jeronimo Bryan M.D.;  Location: [facility];  Service: Cardiothoracic   • LITHOTRIPSY  2018   • BLADDER NECK CONTRACTURE EXICISION       Family History   Problem Relation Age of Onset   • Lung Disease Mother    • Alzheimer's Disease Father    • Diabetes Maternal Grandmother      Social History     Socioeconomic History   • Marital status:      Spouse name: Not on file   • Number of children: Not on file   • Years of education: Not on file   • Highest education level: Not on file   Occupational History   • Not on file   Social Needs   • Financial resource strain: Not on file   • Food  "insecurity     Worry: Not on file     Inability: Not on file   • Transportation needs     Medical: Not on file     Non-medical: Not on file   Tobacco Use   • Smoking status: Never Smoker   • Smokeless tobacco: Never Used   Substance and Sexual Activity   • Alcohol use: No   • Drug use: Never   • Sexual activity: Yes     Partners: Male   Lifestyle   • Physical activity     Days per week: Not on file     Minutes per session: Not on file   • Stress: Not on file   Relationships   • Social connections     Talks on phone: Not on file     Gets together: Not on file     Attends Tenriism service: Not on file     Active member of club or organization: Not on file     Attends meetings of clubs or organizations: Not on file     Relationship status: Not on file   • Intimate partner violence     Fear of current or ex partner: Not on file     Emotionally abused: Not on file     Physically abused: Not on file     Forced sexual activity: Not on file   Other Topics Concern   • Not on file   Social History Narrative   • Not on file     Allergies   Allergen Reactions   • Keflex      Pt unsure of reaction; \"its been years\"       Current Outpatient Medications   Medication Sig Dispense Refill   • warfarin (COUMADIN) 5 MG Tab Take 1 Tab by mouth every day at 6 PM. 90 Tab 1   • FLUoxetine (PROZAC) 20 MG Cap Take 1 Cap by mouth every day. 30 Cap 2     No current facility-administered medications for this visit.        ROS    All others systems reviewed and negative.     Objective:     /65 (BP Location: Left arm, Patient Position: Sitting, BP Cuff Size: Adult)   Ht 1.702 m (5' 7\")   Wt 56.7 kg (125 lb)  Body mass index is 19.58 kg/m².      Vitals obtained by patient:    Physical Exam:  General: No acute distress. Well nourished.   HEENT: EOM grossly intact, no scleral icterus, no pharyngeal erythema.   Neck:  No JVD noted at 90 degrees, trachea midline  CVS: Pulse as reported by patient, no visible LE edema. Well healed midline " incision.  Resp: Unlabored respiratory effort, no cough or audible wheeze  MSK/Ext: No clubbing or cyanosis visible appreciated.  Skin: No rashes in visible areas.  Neurological: CN III-XII grossly intact. No focal deficits.   Psych: A&O x 3, appropriate affect, good judgement, well groomed      Assessment:     1. High risk medication use     2. Mixed hyperlipidemia     3. Severe mitral regurgitation  EC-ECHOCARDIOGRAM COMPLETE W/O CONT   4. S/P mitral valve repair  EC-ECHOCARDIOGRAM COMPLETE W/O CONT   5. Paroxysmal atrial fibrillation (HCC)  ZIO PATCH MONITOR   6. Mitral prolapse     7. Pure hypercholesterolemia     8. Other secondary pulmonary hypertension (HCC)     9. DAWKINS (dyspnea on exertion)     10. Palpitations     11. Lightheaded     12. On amiodarone therapy         Medical Decision Making:  Today's Assessment / Status / Plan:     -Symptomatic severe MR from MVP, now doing really well after surgery, her life is getting better, no more nausea, better energy, no DAWKINS.  -On metoprolol for afib, now post op afib, on amio. Time to stop amio. She would like to try off of metoprolol also. I suspect she will need some AV node blocker, probably switch her to CCB, cardizem because of hair loss/thinning.  -She will stay on warfarin, if she has recurrence of afib, she will be back on this so I want her to stay on this for now. If no more afib, then change to ASA 81 mg.  -Discussed dental work and ABx, discussed guidelines ok for no Abx after 6 months but she prefers to cont to take these.  -No Sign CAD  -Eleveted LDL, no statin at this time  -BP has been ok  -RTC 2 months, virtual    Written instructions given today:      -Stop amiodarone, it can take a few weeks for it to get out of your system.    -Stop metoprolol     -Zio patch heart monitor in 4 weeks, wear it for 2 weeks to look for return of afib    -I want you to stay on the warfarin for now until I know you are not having more afib.    -If we do not see more  afib, we will stop your warfarin and change you over to an ASA 81 mg.     -OTC Biotin for hair    -If your heart starts racing again try to capture afib on your watch or Spling mobile manjula.    Consider Spling mobile manjula if your apple watch is not detecting afib.    -Echocardiogram- heart pictures to look at the heart structure and pump function. This is to establish information about your valve repair for a new baseline.    Return in about 2 months (around 12/29/2020).    It is my pleasure to participate in the care of Ms. Sanchez.  Please do not hesitate to contact me with questions or concerns.    Roshni Gimenez MD, [facility]  Cardiologist [facility]    Please note that this dictation was created using voice recognition software. I have made every reasonable attempt to correct obvious errors, but it is possible there are errors of grammar and possibly content that I did not discover before finalizing the note.      NIKKY Baez.P.R.N.  [address] [facility] [address]  [facility] [address]  Via In Basket

## 2020-10-30 LAB — INR PPP: 2 (ref 2–3.5)

## 2020-11-02 ENCOUNTER — ANTICOAGULATION MONITORING (OUTPATIENT)
Dept: VASCULAR LAB | Facility: MEDICAL CENTER | Age: 75
End: 2020-11-02

## 2020-11-02 DIAGNOSIS — Z98.890 S/P MITRAL VALVE REPAIR: ICD-10-CM

## 2020-11-03 ENCOUNTER — NON-PROVIDER VISIT (OUTPATIENT)
Dept: CARDIOLOGY | Facility: MEDICAL CENTER | Age: 75
End: 2020-11-03
Attending: INTERNAL MEDICINE
Payer: MEDICARE

## 2020-11-03 ENCOUNTER — TELEPHONE (OUTPATIENT)
Dept: CARDIOLOGY | Facility: MEDICAL CENTER | Age: 75
End: 2020-11-03

## 2020-11-03 DIAGNOSIS — I49.1 PAC (PREMATURE ATRIAL CONTRACTION): ICD-10-CM

## 2020-11-03 DIAGNOSIS — I48.0 PAROXYSMAL ATRIAL FIBRILLATION (HCC): ICD-10-CM

## 2020-11-03 NOTE — PROGRESS NOTES
Anticoagulation Summary  As of 2020    INR goal:  2.0-3.0   TTR:  71.2 % (3.9 mo)   INR used for dosin.00 (10/30/2020)   Warfarin maintenance plan:  5 mg (5 mg x 1) every day   Weekly warfarin total:  35 mg   Plan last modified:  Petr Ariza PharmD (2020)   Next INR check:  2020   Target end date:  Indefinite    Indications    Atrial fibrillation (HCC) [I48.91]  S/P mitral valve repair [Z98.890]             Anticoagulation Episode Summary     INR check location:  Home Draw    Preferred lab:      Send INR reminders to:      Comments:  Aces        Anticoagulation Care Providers     Provider Role Specialty Phone number    PARK Baez Referring Family Medicine [phone]    Margy Dunbar P.A.-C. Referring Cardiology [phone]    [facility] Anticoagulation [facility] Responsible  [phone]        Anticoagulation Patient Findings      Spoke with patient to report a therapeutic INR.    Pt instructed to continue with current warfarin dosing regimen, confirms dosing.   Pt denies any s/s of bleeding, bruising, clotting or any changes to diet or medication.    Will follow up in 1.5 week(s).     Felicita Leigh, PharmD

## 2020-11-11 ENCOUNTER — ANTICOAGULATION MONITORING (OUTPATIENT)
Dept: VASCULAR LAB | Facility: MEDICAL CENTER | Age: 75
End: 2020-11-11

## 2020-11-11 DIAGNOSIS — Z98.890 S/P MITRAL VALVE REPAIR: ICD-10-CM

## 2020-11-11 LAB — INR PPP: 2.2 (ref 2–3.5)

## 2020-11-11 NOTE — PROGRESS NOTES
Anticoagulation Summary  As of 2020    INR goal:  2.0-3.0   TTR:  73.9 % (4.3 mo)   INR used for dosin.20 (2020)   Warfarin maintenance plan:  5 mg (5 mg x 1) every day   Weekly warfarin total:  35 mg   Plan last modified:  Petr Ariza, PharmD (2020)   Next INR check:  2020   Target end date:  Indefinite    Indications    Atrial fibrillation (HCC) [I48.91]  S/P mitral valve repair [Z98.890]             Anticoagulation Episode Summary     INR check location:  Home Draw    Preferred lab:      Send INR reminders to:      Comments:  Jessenia        Anticoagulation Care Providers     Provider Role Specialty Phone number    PARK Baez Referring Family Medicine [phone]    Margy Dunbar P.A.-C. Referring Cardiology [phone]    [facility] Anticoagulation Services Responsible  [phone]        Anticoagulation Patient Findings      Left voicemail message to report a therapeutic INR of 2.2.    Pt to continue with current warfarin dosing regimen. Requested pt contact the clinic for any s/s of unusual bleeding, bruising, clotting or any changes to diet or medication.    FU INR in 2 week(s).    Georgia Gibbs, Pharmacy Intern

## 2020-11-27 LAB — INR PPP: 2.1 (ref 2–3.5)

## 2020-11-30 ENCOUNTER — ANTICOAGULATION MONITORING (OUTPATIENT)
Dept: VASCULAR LAB | Facility: MEDICAL CENTER | Age: 75
End: 2020-11-30

## 2020-11-30 DIAGNOSIS — Z98.890 S/P MITRAL VALVE REPAIR: ICD-10-CM

## 2020-12-01 NOTE — PROGRESS NOTES
Anticoagulation Summary  As of 2020    INR goal:  2.0-3.0   TTR:  76.8 % (4.8 mo)   INR used for dosin.10 (2020)   Warfarin maintenance plan:  5 mg (5 mg x 1) every day   Weekly warfarin total:  35 mg   Plan last modified:  Petr Ariza PharmD (2020)   Next INR check:  2020   Target end date:  Indefinite    Indications    Atrial fibrillation (HCC) [I48.91]  S/P mitral valve repair [Z98.890]             Anticoagulation Episode Summary     INR check location:  Home Draw    Preferred lab:      Send INR reminders to:      Comments:  Jessenia        Anticoagulation Care Providers     Provider Role Specialty Phone number    PARK Baez Referring Family Medicine [phone]    Margy Dunbar P.A.-C. Referring Cardiology [phone]    [facility] Anticoagulation Services Responsible  [phone]        Anticoagulation Patient Findings        Spoke to patient on the phone.   INR  therapeutic.   Denies signs/symptoms of bleeding and/or thrombosis.   Denies changes to diet or medications.   Follow up appointment in 2 week(s).    Continue the same warfarin dose, as noted above.       Petr Ariza, PharmD, MS, BCACP, LCC    This note was created using voice recognition software (Dragon). The accuracy of the dictation is limited by the abilities of the software. I have reviewed the note prior to signing, however some errors in grammar and context are still possible. If you have any questions related to this note please do not hesitate to contact our office.

## 2020-12-09 ENCOUNTER — ANCILLARY PROCEDURE (OUTPATIENT)
Dept: CARDIOLOGY | Facility: IMAGING CENTER | Age: 75
End: 2020-12-09
Attending: INTERNAL MEDICINE
Payer: MEDICARE

## 2020-12-09 ENCOUNTER — TELEPHONE (OUTPATIENT)
Dept: CARDIOLOGY | Facility: MEDICAL CENTER | Age: 75
End: 2020-12-09

## 2020-12-09 ENCOUNTER — NON-PROVIDER VISIT (OUTPATIENT)
Dept: CARDIOLOGY | Facility: MEDICAL CENTER | Age: 75
End: 2020-12-09
Payer: MEDICARE

## 2020-12-09 DIAGNOSIS — Z98.890 S/P MITRAL VALVE REPAIR: ICD-10-CM

## 2020-12-09 DIAGNOSIS — I48.91 ATRIAL FIBRILLATION, UNSPECIFIED TYPE (HCC): ICD-10-CM

## 2020-12-09 DIAGNOSIS — I47.10 SVT (SUPRAVENTRICULAR TACHYCARDIA) (HCC): ICD-10-CM

## 2020-12-09 DIAGNOSIS — I34.0 SEVERE MITRAL REGURGITATION: ICD-10-CM

## 2020-12-09 LAB
LV EJECT FRACT  99904: 65
LV EJECT FRACT MOD 2C 99903: 69.92
LV EJECT FRACT MOD 4C 99902: 52.34
LV EJECT FRACT MOD BP 99901: 60.53

## 2020-12-09 PROCEDURE — 93306 TTE W/DOPPLER COMPLETE: CPT | Performed by: INTERNAL MEDICINE

## 2020-12-10 NOTE — TELEPHONE ENCOUNTER
Patient enrolled in the Zio patch program 11/03/20.This was a home enrollment. Patient brought patch in today to have it placed. She was waiting until after her echocardiogram per Pau's instructions. Monitoring started today.    >Currently pending EOS.

## 2020-12-11 ENCOUNTER — ANTICOAGULATION MONITORING (OUTPATIENT)
Dept: VASCULAR LAB | Facility: MEDICAL CENTER | Age: 75
End: 2020-12-11

## 2020-12-11 DIAGNOSIS — I48.91 ATRIAL FIBRILLATION, UNSPECIFIED TYPE (HCC): ICD-10-CM

## 2020-12-11 DIAGNOSIS — Z98.890 S/P MITRAL VALVE REPAIR: ICD-10-CM

## 2020-12-11 LAB — INR PPP: 2.2 (ref 2–3.5)

## 2020-12-11 NOTE — PROGRESS NOTES
OP   Telephone Anticoagulation Service Note      Anticoagulation Summary  As of 2020    INR goal:  2.0-3.0   TTR:  78.8 % (5.3 mo)   INR used for dosin.20 (2020)   Warfarin maintenance plan:  5 mg (5 mg x 1) every day   Weekly warfarin total:  35 mg   Plan last modified:  Akbar PyleD (2020)   Next INR check:  2020   Target end date:  Indefinite    Indications    Atrial fibrillation (HCC) [I48.91]  S/P mitral valve repair [Z98.890]             Anticoagulation Episode Summary     INR check location:  Home Draw    Preferred lab:      Send INR reminders to:      Comments:  Jessenia        Anticoagulation Care Providers     Provider Role Specialty Phone number    PARK Baez Referring Family Medicine [phone]    Margy Dunbar P.A.-C. Referring Cardiac Surgery [phone]    [facility] Anticoagulation Services Responsible  [phone]        Anticoagulation Patient Findings     Spoke with the patient on the phone today, reporting a therapeutic INR of 2.2.   Confirmed the current warfarin dosing regimen and patient compliance.   Patient denies any interval changes to diet and/or medications. Patient denies any signs/symptoms of bleeding or clotting.  Patient instructed to continue with the current warfarin dosing regimen, and asked to follow up again in 2 weeks.     Anastasiya Castro  PharmD

## 2020-12-22 ENCOUNTER — ANTICOAGULATION MONITORING (OUTPATIENT)
Dept: VASCULAR LAB | Facility: MEDICAL CENTER | Age: 75
End: 2020-12-22

## 2020-12-22 DIAGNOSIS — I48.91 ATRIAL FIBRILLATION, UNSPECIFIED TYPE (HCC): ICD-10-CM

## 2020-12-22 DIAGNOSIS — Z98.890 S/P MITRAL VALVE REPAIR: ICD-10-CM

## 2020-12-22 LAB — INR PPP: 2.2 (ref 2–3.5)

## 2020-12-23 NOTE — PROGRESS NOTES
OP   Telephone Anticoagulation Service Note      Anticoagulation Summary  As of 2020    INR goal:  2.0-3.0   TTR:  80.2 % (5.7 mo)   INR used for dosin.20 (2020)   Warfarin maintenance plan:  5 mg (5 mg x 1) every day   Weekly warfarin total:  35 mg   No change documented:  Janet Castro   Plan last modified:  Petr Ariza, PharmD (2020)   Next INR check:  2021   Target end date:  Indefinite    Indications    Atrial fibrillation (HCC) [I48.91]  S/P mitral valve repair [Z98.890]             Anticoagulation Episode Summary     INR check location:  Home Draw    Preferred lab:      Send INR reminders to:      Comments:  Jessenia        Anticoagulation Care Providers     Provider Role Specialty Phone number    PARK Baez Referring Family Medicine [phone]    Margy Dunbar P.A.-C. Referring Cardiac Surgery [phone]    [facility] Anticoagulation Services Responsible  [phone]        Anticoagulation Patient Findings  Patient Findings     Negatives:  Signs/symptoms of thrombosis, Signs/symptoms of bleeding, Laboratory test error suspected, Change in health, Change in alcohol use, Change in activity, Upcoming invasive procedure, Emergency department visit, Upcoming dental procedure, Missed doses, Extra doses, Change in medications, Change in diet/appetite, Hospital admission, Bruising, Other complaints         Spoke with the patient on the phone today, reporting a therapeutic INR of 2.2.   Confirmed the current warfarin dosing regimen and patient compliance.  Patient denies any interval changes to diet and/or medications. Patient denies any signs/symptoms of bleeding or clotting.  Patient instructed to continue with the current warfarin dosing regimen, and asked to follow up again in 2 weeks.     Anastasiya Castro  PharmD

## 2020-12-29 PROCEDURE — 99999 PR NO CHARGE: CPT | Performed by: INTERNAL MEDICINE

## 2020-12-29 PROCEDURE — 0298T PR EXT ECG > 48HR TO 21 DAY REVIEW AND INTERPRETATN: CPT | Performed by: INTERNAL MEDICINE

## 2020-12-29 PROCEDURE — 0296T PR EXT ECG > 48HR TO 21 DAY RCRD W/CONECT INTL RCRD: CPT | Performed by: INTERNAL MEDICINE

## 2021-01-08 ENCOUNTER — ANTICOAGULATION MONITORING (OUTPATIENT)
Dept: VASCULAR LAB | Facility: MEDICAL CENTER | Age: 76
End: 2021-01-08

## 2021-01-08 DIAGNOSIS — Z98.890 S/P MITRAL VALVE REPAIR: ICD-10-CM

## 2021-01-08 DIAGNOSIS — I48.91 ATRIAL FIBRILLATION, UNSPECIFIED TYPE (HCC): ICD-10-CM

## 2021-01-08 LAB — INR PPP: 2 (ref 2–3.5)

## 2021-01-08 NOTE — PROGRESS NOTES
OP   Telephone Anticoagulation Service Note      Anticoagulation Summary  As of 2021    INR goal:  2.0-3.0   TTR:  82.0 % (6.2 mo)   INR used for dosin.00 (2021)   Warfarin maintenance plan:  5 mg (5 mg x 1) every day   Weekly warfarin total:  35 mg   No change documented:  Janet Castro   Plan last modified:  Akbar PyleD (2020)   Next INR check:  2021   Target end date:  Indefinite    Indications    Atrial fibrillation (HCC) [I48.91]  S/P mitral valve repair [Z98.890]             Anticoagulation Episode Summary     INR check location:  Home Draw    Preferred lab:      Send INR reminders to:      Comments:  Jessenia        Anticoagulation Care Providers     Provider Role Specialty Phone number    PARK Baez Referring Family Medicine [phone]    Margy Dunbar P.A.-C. Referring Cardiac Surgery [phone]    [facility] Anticoagulation Services Responsible  [phone]        Anticoagulation Patient Findings  Patient Findings     Negatives:  Signs/symptoms of thrombosis, Signs/symptoms of bleeding, Laboratory test error suspected, Change in health, Change in alcohol use, Change in activity, Upcoming invasive procedure, Emergency department visit, Upcoming dental procedure, Missed doses, Extra doses, Change in medications, Change in diet/appetite, Hospital admission, Bruising, Other complaints         Spoke with the patient on the phone today, reporting a therapeutic INR of 2.0.   Confirmed the current warfarin dosing regimen and patient compliance.  Patient denies any interval changes to diet and/or medications. Patient denies any signs/symptoms of bleeding or clotting.  Patient instructed to continue with the current warfarin dosing regimen, and asked to follow up again in 2 weeks.     Anastasiya WebberD

## 2021-01-11 ENCOUNTER — TELEMEDICINE (OUTPATIENT)
Dept: CARDIOLOGY | Facility: MEDICAL CENTER | Age: 76
End: 2021-01-11
Payer: MEDICARE

## 2021-01-11 VITALS
BODY MASS INDEX: 19.62 KG/M2 | HEART RATE: 64 BPM | DIASTOLIC BLOOD PRESSURE: 71 MMHG | WEIGHT: 125 LBS | HEIGHT: 67 IN | SYSTOLIC BLOOD PRESSURE: 144 MMHG

## 2021-01-11 DIAGNOSIS — Z79.899 ON AMIODARONE THERAPY: ICD-10-CM

## 2021-01-11 DIAGNOSIS — I47.10 SVT (SUPRAVENTRICULAR TACHYCARDIA) (HCC): ICD-10-CM

## 2021-01-11 DIAGNOSIS — I49.1 PAC (PREMATURE ATRIAL CONTRACTION): ICD-10-CM

## 2021-01-11 DIAGNOSIS — R00.2 PALPITATIONS: ICD-10-CM

## 2021-01-11 DIAGNOSIS — R42 LIGHTHEADED: ICD-10-CM

## 2021-01-11 DIAGNOSIS — E78.00 PURE HYPERCHOLESTEROLEMIA: ICD-10-CM

## 2021-01-11 DIAGNOSIS — E78.2 MIXED HYPERLIPIDEMIA: ICD-10-CM

## 2021-01-11 DIAGNOSIS — R06.09 DOE (DYSPNEA ON EXERTION): ICD-10-CM

## 2021-01-11 DIAGNOSIS — I34.1 MITRAL PROLAPSE: ICD-10-CM

## 2021-01-11 DIAGNOSIS — Z79.899 HIGH RISK MEDICATION USE: ICD-10-CM

## 2021-01-11 DIAGNOSIS — Z98.890 S/P MITRAL VALVE REPAIR: ICD-10-CM

## 2021-01-11 DIAGNOSIS — I48.0 PAROXYSMAL ATRIAL FIBRILLATION (HCC): ICD-10-CM

## 2021-01-11 DIAGNOSIS — I27.29 OTHER SECONDARY PULMONARY HYPERTENSION (HCC): ICD-10-CM

## 2021-01-11 DIAGNOSIS — I34.0 SEVERE MITRAL REGURGITATION: ICD-10-CM

## 2021-01-11 PROCEDURE — 99214 OFFICE O/P EST MOD 30 MIN: CPT | Mod: 95,CR | Performed by: INTERNAL MEDICINE

## 2021-01-11 ASSESSMENT — FIBROSIS 4 INDEX: FIB4 SCORE: 4.11

## 2021-01-11 NOTE — PROGRESS NOTES
Subjective:   Chief Complaint:   Chief Complaint   Patient presents with   • Atrial Fibrillation   • Hyperlipidemia       This evaluation was conducted via Zoom using secure and encrypted videoconferencing technology. The patient was in a private location in the state of [place].    The patient's identity was confirmed and verbal consent was obtained for this virtual visit.    Frances Sanchez is a 75 y.o. female who returns for PAF, mitral valve prolapse/severe MR, s/p MV repair, SUSHMA ligation/excision, amiodarone therapy.    Was having daily nausea, this is gone, DAWKINS, depression, poor appetite, lots of problems.  Echocardiogram 5/7/2020 with prolapse of the posterior leaflet with moderate to severe mitral regurgitation, RVSP 35.  Underwent radical MV repair/P2 resection, 34 mm Montoya annuloplasty ring, SUSHMA ligation/excision.  She is doing well, feels % herself overall.    Had post op afib, sent home on amiodarone.  On warfarin, CHADS2 of 2 with MV disease but SUSHMA ligation.  We stopped amio, she wore a zio, up to 31 sec of SVT, no obvious afib.  She does feel this, mostly in the evening when she lies down.  Previously had paroxysmal atrial fibrillation around 2014 at [facility], last seen by them November 2019. Dr. Samayoa, wore a monitor in 2019.  Chads 2 vascular score of 2, 2, previously suggested Xarelto, but then was on ASA, is not taking it until 3 days ago, takes with milk, did not tolerate.     Metoprolol caused hair loss.    Has had tinnitus, not new, not related to ASA.    Has hyperlipidemia, .  Has vitamin D deficiency, back in normal range.  Has had elevated A1c in the past but not recent.    She likes to walk, 2-4 miles per day. Will use a stationary bike in the cold weather.  She is not limited by chest pain, pressure or tightness with activity.   No significant dyspnea on exertion, orthopnea or lower extremity swelling.   No significant palpitations or presyncope/syncope.   No symptoms of  leg claudication.   No stroke/TIA like symptoms.     Some mild lightheadedness but not limiting.    No family history of premature coronary artery disease.  No prior smoking history.  No history of diabetes.  No history of hypertension. Checking BP at home.  No history of autoimmune disease such as lupus or rheumatoid arthritis.  No chronic kidney disease.    Her , Aleksandar, on the visit today.  They are recently .    DATA REVIEWED by me:  ECG 6-  Sinus, 74, PACs, delayed R wave progression, T wave inversion, consider ischemia    ECG 5/7/2020  Sinus, 77, borderline anterior ST depression    ZioPatch Summary: 12-  1. Sinus rhythm with appropriate heart rate range. Average 75, range 56 to 210 bpm.   2. Normal sinus node and AV node conduction normal. No pauses.   3.  Occasional PACs.   4. Rare PVCs.   5. One 8 beat run of nonsustained ventricular tachycardia at 1:36 AM during sleep.  40 runs of SVT, up to 31 seconds.   6. 1 patient triggers during sinus, rate 88 to 97 bpm and associated with a single PVC, symptoms were not reported.   Conclusion: Occasional premature atrial contractions, occasional runs of SVT, up to 31 seconds. No obvious atrial fibrillation.     Echo 12-9-2020  Left ventricular ejection fraction is visually estimated to be 65%.  Known mitral valve repair which is functioning normally:Mean   transvalvular gradient is 3  mmHg at a heart rate of 76 BPM.  Estimated right ventricular systolic pressure is  20 mmHg.  Compared to prior echo 05/07/20, mitral valve repair is now present.    Echo 5/7/2020  No prior study is available for comparison.   Left ventricular ejection fraction is visually estimated to be 65%.  Normal regional wall motion.  The left atrium is normal in size.  Prolapse of the posterior mitral leaflet was present.  Moderate appearing, likely severe anterior directed  mitral   regurgitation.  Estimated right ventricular systolic pressure  is 35 mmHg.    [facility]  5-  Pre-operative Diagnosis:  Symptomatic severe mitral regurgitation     Post-operative Diagnosis:   1. No evidence of coronary artery disease  2. 4+ mitral regurgitation  3. Normal left ventricular systolic function. EF 70%  4. Normal sized aortic root without significant aortic regurgitation    Open heart surgery Dr. Bryan 6-  Procedure(s):  RADICAL MITRAL VALVE REPAIR (P2 triangular resection including ruptured chord, 34 mm Montoya flexible annuloplasty band)  LEFT ATRIAL APPENDAGE LIGATION/EXCISION   Primary cord that was preserved was reimplanted in   the repair.   The left atrial appendage was ligated at   its base and excised.  The stump was oversewn in 2 layers using #4-0 Prolene   sutures.  A left atriotomy was performed just below the interatrial groove.      Most recent labs:       Lab Results   Component Value Date/Time    HEMOGLOBIN 9.6 (L) 06/20/2020 02:30 AM    HEMATOCRIT 29.3 (L) 06/20/2020 02:30 AM    MCV 92.7 06/20/2020 02:30 AM    INR 2.00 01/08/2021      Lab Results   Component Value Date/Time    SODIUM 135 06/22/2020 07:27 AM    POTASSIUM 4.2 06/22/2020 07:27 AM    CHLORIDE 95 (L) 06/22/2020 07:27 AM    CO2 28 06/22/2020 07:27 AM    GLUCOSE 133 (H) 06/22/2020 07:27 AM    BUN 12 06/22/2020 07:27 AM    CREATININE 0.72 06/22/2020 07:27 AM      Lab Results   Component Value Date/Time    ASTSGOT 18 06/16/2020 11:00 AM    ALTSGPT 16 06/16/2020 11:00 AM    ALBUMIN 4.2 06/16/2020 11:00 AM      Lab Results   Component Value Date/Time    CHOLSTRLTOT 213 (H) 05/07/2020 11:55 AM     (H) 05/07/2020 11:55 AM    HDL 61 05/07/2020 11:55 AM    TRIGLYCERIDE 97 05/07/2020 11:55 AM           Past Medical History:   Diagnosis Date   • Arrhythmia     hx atrial fibrillation   • Heart murmur 2019    mitral valve prolapse   • Heart valve disease    • Hyperlipidemia    • Kidney stone     hx   • HEATHER (obstructive sleep apnea)     pt denies    • Pneumonia 2015     Past Surgical History:   Procedure  Laterality Date   • PB REPLACEMENT OF MITRAL VALVE  6/17/2020    Procedure: MITRAL VALVE REPAIR;  Surgeon: Jeronimo Bryan M.D.;  Location: SURGERY [facility];  Service: Cardiothoracic   • BRIGID  6/17/2020    Procedure: ECHOCARDIOGRAM, TRANSESOPHAGEAL;  Surgeon: Jeronimo Bryan M.D.;  Location: SURGERY [facility];  Service: Cardiothoracic   • LITHOTRIPSY  2018   • BLADDER NECK CONTRACTURE EXICISION       Family History   Problem Relation Age of Onset   • Lung Disease Mother    • Alzheimer's Disease Father    • Diabetes Maternal Grandmother      Social History     Socioeconomic History   • Marital status:      Spouse name: Not on file   • Number of children: Not on file   • Years of education: Not on file   • Highest education level: Not on file   Occupational History   • Not on file   Social Needs   • Financial resource strain: Not on file   • Food insecurity     Worry: Not on file     Inability: Not on file   • Transportation needs     Medical: Not on file     Non-medical: Not on file   Tobacco Use   • Smoking status: Never Smoker   • Smokeless tobacco: Never Used   Substance and Sexual Activity   • Alcohol use: No   • Drug use: Never   • Sexual activity: Yes     Partners: Male   Lifestyle   • Physical activity     Days per week: Not on file     Minutes per session: Not on file   • Stress: Not on file   Relationships   • Social connections     Talks on phone: Not on file     Gets together: Not on file     Attends Buddhism service: Not on file     Active member of club or organization: Not on file     Attends meetings of clubs or organizations: Not on file     Relationship status: Not on file   • Intimate partner violence     Fear of current or ex partner: Not on file     Emotionally abused: Not on file     Physically abused: Not on file     Forced sexual activity: Not on file   Other Topics Concern   • Not on file   Social History Narrative   • Not on file     Allergies   Allergen Reactions   •  "Keflex      Pt unsure of reaction; \"its been years\"       Current Outpatient Medications   Medication Sig Dispense Refill   • warfarin (COUMADIN) 5 MG Tab Take 1 Tab by mouth every day at 6 PM. 90 Tab 1     No current facility-administered medications for this visit.        ROS    All others systems reviewed and negative.     Objective:     /71 (BP Location: Left arm, Patient Position: Sitting, BP Cuff Size: Adult)   Pulse 64   Ht 1.702 m (5' 7\")   Wt 56.7 kg (125 lb)  Body mass index is 19.58 kg/m².    Vitals obtained by patient:    Physical Exam:  General: No acute distress. Well nourished.   HEENT: EOM grossly intact, no scleral icterus, no pharyngeal erythema.  Phonation normal.  Neck:  No JVD noted at 90 degrees, trachea midline, no obvious masses, moves freely without pain.  CVS: Pulse as reported by patient, no visible LE edema.  Resp: Unlabored respiratory effort, no cough or audible wheeze  MSK/Ext: No clubbing or cyanosis visible appreciated.  Skin: No rashes in visible areas.  Neurological: CN III-XII grossly intact. No focal deficits.   Psych: A&O x 3, appropriate affect, good judgement, well groomed      Assessment:     1. Severe mitral regurgitation     2. S/P mitral valve repair     3. Paroxysmal atrial fibrillation (HCC)  REFERRAL TO CARDIOLOGY   4. PAC (premature atrial contraction)     5. High risk medication use     6. DAWKINS (dyspnea on exertion)     7. Other secondary pulmonary hypertension (HCC)     8. Pure hypercholesterolemia     9. Mixed hyperlipidemia     10. Palpitations     11. Mitral prolapse     12. Lightheaded     13. On amiodarone therapy     14. SVT (supraventricular tachycardia) (HCC)         Medical Decision Making:  Today's Assessment / Status / Plan:     -Symptomatic severe MR from MVP, now doing really well after surgery, her life is getting better, no more nausea, better energy, no DAWKINS.  -Does not want to take metoprolol, caused hair loss, if the SVT bothers her than can " try cardizem  -I would like her to see EP to ensure  -She will stay on warfarin, if she has recurrence of afib, she will be back on this so I want her to stay on this for now. If no more afib, then change to ASA 81 mg.  -Discussed dental work and ABx, discussed guidelines ok for no Abx after 6 months but she prefers to cont to take these.  -No Sign CAD  -Eleveted LDL, no statin at this time  -BP has been ok  -Refer to EP to discuss risk/benefit of coming off of warfarin  -Will get the covid vaccine  -RTC 6 months, virtual    Written instructions given today:      -You had a left atrial appendage ligation during your surgery, your risk of stroke it lower than others with paroxysmal atrial fibrillation. Please see the EP provider to discuss risk/benefit of coming off of warfarin.    -I will see you back in 6 months and then yearly.      Return in about 6 months (around 7/11/2021).    It is my pleasure to participate in the care of Ms. Sanchez.  Please do not hesitate to contact me with questions or concerns.    Roshni Gimenez MD, [facility]  Cardiologist [facility]    Please note that this dictation was created using voice recognition software. I have made every reasonable attempt to correct obvious errors, but it is possible there are errors of grammar and possibly content that I did not discover before finalizing the note.

## 2021-01-11 NOTE — PATIENT INSTRUCTIONS
-You had a left atrial appendage ligation during your surgery, your risk of stroke it lower than others with paroxysmal atrial fibrillation. Please see the EP provider to discuss risk/benefit of coming off of warfarin.    -I will see you back in 6 months and then yearly.

## 2021-01-19 ENCOUNTER — TELEMEDICINE (OUTPATIENT)
Dept: CARDIOLOGY | Facility: MEDICAL CENTER | Age: 76
End: 2021-01-19
Payer: MEDICARE

## 2021-01-19 VITALS
HEART RATE: 93 BPM | BODY MASS INDEX: 19.58 KG/M2 | SYSTOLIC BLOOD PRESSURE: 144 MMHG | DIASTOLIC BLOOD PRESSURE: 64 MMHG | WEIGHT: 125 LBS

## 2021-01-19 DIAGNOSIS — I48.0 PAROXYSMAL ATRIAL FIBRILLATION (HCC): ICD-10-CM

## 2021-01-19 PROCEDURE — 99214 OFFICE O/P EST MOD 30 MIN: CPT | Mod: 95,CR | Performed by: INTERNAL MEDICINE

## 2021-01-19 ASSESSMENT — FIBROSIS 4 INDEX: FIB4 SCORE: 4.11

## 2021-01-19 NOTE — PROGRESS NOTES
"Telemedicine: New Patient   This evaluation was conducted via Zoom using secure and encrypted videoconferencing technology. The patient was in a private location in the state of [place].    The patient's identity was confirmed and verbal consent was obtained for this virtual visit.    Subjective:     CC:   Chief Complaint   Patient presents with   • Atrial Fibrillation     VOD DX:HOMER Sanchez is a 76 y.o. female presenting to establish care and to discuss the evaluation and management of:    Afib    76-year-old female with a history of mitral valve disease status post mitral valve repair June 2020, paroxysmal atrial fibrillation diagnosed on a Holter monitor, on warfarin, status post mitral valve repair and left atrial appendage excision June 2020, who is referred now for evaluation of atrial fibrillation and anticoagulation.  She does have occasional palpitations, but she says her atrial fibrillation was diagnosed on a monitor when she could not actually feel palpitations happening.  She is interested in coming off of warfarin at this time given that she had a left atrial appendage excision.  She wants to know my opinions on this.  She has no syncope or presyncope.  No chest pain or shortness of breath.  She has minimal symptoms and does not take any other medications aside from warfarin at this time.    ROS   12 systems were reviewed, all systems reviewed were negative      Allergies   Allergen Reactions   • Keflex      Pt unsure of reaction; \"its been years\"       Current medicines (including changes today)  Current Outpatient Medications   Medication Sig Dispense Refill   • warfarin (COUMADIN) 5 MG Tab Take 1 Tab by mouth every day at 6 PM. 90 Tab 1     No current facility-administered medications for this visit.        She  has a past medical history of Arrhythmia, Heart murmur (2019), Heart valve disease, Hyperlipidemia, Kidney stone, HEATHER (obstructive sleep apnea), and Pneumonia (2015).  She  " has a past surgical history that includes bladder neck contracture exicision; lithotripsy (2018); pr replacement of mitral valve (2020); and crista (2020).      Family History   Problem Relation Age of Onset   • Lung Disease Mother    • Alzheimer's Disease Father    • Diabetes Maternal Grandmother      Family Status   Relation Name Status   • Mo     • Fa     • MGMo  (Not Specified)       Patient Active Problem List    Diagnosis Date Noted   • Severe mitral regurgitation 2020     Priority: High   • Constipation 2020   • Hospital discharge follow-up 2020   • S/P mitral valve repair 2020   • Anxiety 2020   • Mixed hyperlipidemia 05/15/2020   • Atrial fibrillation (HCC) 2020   • Nausea 2020   • Other hemorrhoids 2020          Objective:   /64 Comment: PT STATES  Pulse 93   Wt 56.7 kg (125 lb) Comment: PT STATES  BMI 19.58 kg/m²     Physical Exam   Alert and oriented x3  No acute distress  Pulse regular as per report of patient  Breathing unlabored    Labs are reviewed in epic    Echocardiogram reviewed, normal ejection fraction    Holter monitor reviewed, paroxysmal SVT without any evidence of sustained atrial fibrillation      Assessment and Plan:   The following treatment plan was discussed:     1.  Mitral valve disease status post repair  2.  Paroxysmal atrial fibrillation on warfarin, status post left atrial appendage ligation  3.  Oral anticoagulation monitoring, warfarin    -Given that she has minimal atrial fibrillation, combined with history of left atrial appendage excision, I think it is reasonable to discontinue warfarin and start aspirin 81 mg daily.  We discussed the risk and benefits of this strategy and she is in agreement.    I will plan to see her in 6 months for an ECG and in person evaluation.    Follow-up: No follow-ups on file.

## 2021-02-11 ENCOUNTER — TELEPHONE (OUTPATIENT)
Dept: VASCULAR LAB | Facility: MEDICAL CENTER | Age: 76
End: 2021-02-11

## 2021-02-11 ENCOUNTER — ANTICOAGULATION MONITORING (OUTPATIENT)
Dept: VASCULAR LAB | Facility: MEDICAL CENTER | Age: 76
End: 2021-02-11

## 2021-02-11 DIAGNOSIS — Z98.890 S/P MITRAL VALVE REPAIR: ICD-10-CM

## 2021-02-11 DIAGNOSIS — I48.91 ATRIAL FIBRILLATION, UNSPECIFIED TYPE (HCC): ICD-10-CM

## 2021-02-11 NOTE — TELEPHONE ENCOUNTER
Spoke with Frances, Dr. Back discontinued her anticoagulation.  Will dc from service.  Angela Corral, Clinical Pharmacist, CDE, CACP

## 2021-02-11 NOTE — PROGRESS NOTES
Discharged from [facility].  Pt transitioned to ASA by cardiology.  Will defer all medical management to cardiology/pcp.  Angela Corral, Clinical Pharmacist, CDE, CACP

## 2021-07-23 ENCOUNTER — TELEPHONE (OUTPATIENT)
Dept: CARDIOLOGY | Facility: MEDICAL CENTER | Age: 76
End: 2021-07-23

## 2021-07-23 NOTE — TELEPHONE ENCOUNTER
Called patient today 4 times and left vm's each time and on the last call I left a message to reschedule their appointment for a future date at [phone] on 7/23/2021.

## 2021-11-03 ENCOUNTER — APPOINTMENT (OUTPATIENT)
Dept: CARDIOLOGY | Facility: PHYSICIAN GROUP | Age: 76
End: 2021-11-03
Payer: MEDICARE

## 2022-08-02 ENCOUNTER — TELEPHONE (OUTPATIENT)
Dept: MEDICAL GROUP | Facility: PHYSICIAN GROUP | Age: 77
End: 2022-08-02
Payer: MEDICARE

## 2022-09-01 ENCOUNTER — TELEPHONE (OUTPATIENT)
Dept: MEDICAL GROUP | Facility: CLINIC | Age: 77
End: 2022-09-01
Payer: MEDICARE

## 2022-09-01 ENCOUNTER — HOSPITAL ENCOUNTER (EMERGENCY)
Facility: MEDICAL CENTER | Age: 77
End: 2022-09-01
Attending: EMERGENCY MEDICINE
Payer: MEDICARE

## 2022-09-01 VITALS
RESPIRATION RATE: 18 BRPM | WEIGHT: 137.57 LBS | DIASTOLIC BLOOD PRESSURE: 70 MMHG | BODY MASS INDEX: 21.59 KG/M2 | OXYGEN SATURATION: 97 % | HEART RATE: 89 BPM | SYSTOLIC BLOOD PRESSURE: 156 MMHG | HEIGHT: 67 IN | TEMPERATURE: 99.5 F

## 2022-09-01 DIAGNOSIS — U07.1 COVID-19: ICD-10-CM

## 2022-09-01 PROCEDURE — 99282 EMERGENCY DEPT VISIT SF MDM: CPT

## 2022-09-02 NOTE — TELEPHONE ENCOUNTER
Received an on-call message patient had a temp and was wanting a COVID test.  Patient reported a 103.7 temperature.  Not feeling well.  Recommend patient go to the ER due to further evaluation due to elevated temperature.  Patient agreed to go.  As she is in [place], recommended [facility] or [facility].

## 2022-09-02 NOTE — ED NOTES
PT given AVS documentation. PT verbally acknowledges understanding of discharge instructions. VSS. All belongings accounted for by PT. PT ambulated to ED lobby with steady gait.

## 2022-09-02 NOTE — DISCHARGE INSTRUCTIONS
Take 400 mg of ibuprofen 3 times daily and 500 mg of Tylenol 3 times daily for the next 3 days.  Return here for increased or refractory fevers, trouble breathing or any new or worsening symptoms

## 2022-09-02 NOTE — ED NOTES
"PT took \"1 [employer]-brand ibuprofen\" for fever management at home. PT reports feeling better after taking medication and states \"I don't really feel like I need to be here\". PT would like to inquire about Paxlovid treatment.  "

## 2022-09-02 NOTE — ED TRIAGE NOTES
"Frances Sanchez  77 y.o.  Chief Complaint   Patient presents with    Flu Like Symptoms     Starting Sunday, tested positive for covid, fevers at home controlled with medication     Ambulatory to [facility] for above, pt tested positive at home for covid starting Sunday. Pt reporting mostly resolved symptoms such as headache and fevers, took ibuprofen at home. Interesting in being evaluated for \"covid medication.\" A&Ox4, NAD, placed back in [facility].  "

## 2022-09-02 NOTE — ED PROVIDER NOTES
ED Provider Note    CHIEF COMPLAINT  Chief Complaint   Patient presents with    Flu Like Symptoms     Starting Sunday, tested positive for covid, fevers at home controlled with medication       HPI  Frances Sanchez is a 77 y.o. female who presents for evaluation of fever body aches mild headache.  The patient is an otherwise healthy 77-year-old.  She is currently not on any medications.  She started feeling ill around 4 days ago.  She developed a fever earlier today of potentially 102 to 103 °F.  She took some ibuprofen and it came down.  She noticed that her heart rate was somewhat elevated when she had a fever.  She took a home COVID-19 test which was positive.  She is quadruple vaccinated.  She denies any trouble breathing abdominal pain diarrhea or rash    REVIEW OF SYSTEMS  See HPI for further details.  Positive for fever mild headache all other systems are negative.     PAST MEDICAL HISTORY  Past Medical History:   Diagnosis Date    Heart murmur 2019    mitral valve prolapse    Pneumonia 2015    Arrhythmia     hx atrial fibrillation    Heart valve disease     Hyperlipidemia     Kidney stone     hx    HEATHER (obstructive sleep apnea)     pt denies        FAMILY HISTORY  Noncontributory    SOCIAL HISTORY  Social History     Socioeconomic History    Marital status:    Tobacco Use    Smoking status: Never    Smokeless tobacco: Never   Vaping Use    Vaping Use: Never used   Substance and Sexual Activity    Alcohol use: No    Drug use: Never    Sexual activity: Yes     Partners: Male       SURGICAL HISTORY  Past Surgical History:   Procedure Laterality Date    KS REPLACEMENT OF MITRAL VALVE  6/17/2020    Procedure: MITRAL VALVE REPAIR;  Surgeon: Jeronimo Bryan M.D.;  Location: [facility];  Service: Cardiothoracic    BRIGID  6/17/2020    Procedure: ECHOCARDIOGRAM, TRANSESOPHAGEAL;  Surgeon: Jeronimo Bryan M.D.;  Location: SURGERY [facility];  Service: Cardiothoracic    LITHOTRIPSY  2018     "BLADDER NECK CONTRACTURE EXICISION         CURRENT MEDICATIONS  Home Medications       Reviewed by Jeny Rodas R.N. (Registered Nurse) on 09/01/22 at 1949  Med List Status: Not Addressed     Medication Last Dose Status        Patient Riaz Taking any Medications                           ALLERGIES  Allergies   Allergen Reactions    Keflex      Pt unsure of reaction; \"its been years\"       PHYSICAL EXAM  VITAL SIGNS: BP (!) 154/83   Pulse (!) 111   Temp 37.5 °C (99.5 °F) (Oral)   Resp 16   Ht 1.702 m (5' 7\")   Wt 62.4 kg (137 lb 9.1 oz)   SpO2 95%   BMI 21.55 kg/m²       Constitutional: Well developed, Well nourished, No acute distress, Non-toxic appearance.   HENT: Normocephalic, Atraumatic, Bilateral external ears normal, Oropharynx moist, No oral exudates, Nose normal.   Eyes: PERRLA, EOMI, Conjunctiva normal, No discharge.   Neck: Normal range of motion, No tenderness, Supple, No stridor.   Cardiovascular: Normal heart rate, Normal rhythm, No murmurs, No rubs, No gallops.   Thorax & Lungs: Normal breath sounds, No respiratory distress, No wheezing, No chest tenderness.   Cardiac: Mild tachycardia regular rhythm no murmurs rubs or gallops  Abdomen: Bowel sounds normal, Soft, No tenderness, No masses, No pulsatile masses.   Skin: Warm, Dry, No erythema, No rash.   Back: No tenderness, No CVA tenderness.   extremities: Intact distal pulses, No edema, No tenderness, No cyanosis, No clubbing.   Neurologic: Alert & oriented x 3, Normal motor function, Normal sensory function, No focal deficits noted.   Psychiatric: Anxious    COURSE & MEDICAL DECISION MAKING  Pertinent Labs & Imaging studies reviewed. (See chart for details)  Patient presents here with what I would consider relatively mild COVID-19.  She has low-grade temperature mild tachycardia.  Her rhythm on exam is normal and does not suggest irregular rhythm or atrial fibrillation.  Her lungs are completely clear and she has no respiratory " symptoms and pulse ox is 95%.  I do long discussion with the patient regarding any interventions.  She is 77 and quite healthy.  We did discuss oral antiviral therapy such as Paxlovid.  Patient is already 5 days in and is otherwise healthy.  We discussed the possibility of the rebound phenomena that is happening quite a bit and she would like to simply wait this out with antipyretics and general supportive care which I think is reasonable.  I counseled her to return she develops any pulmonary symptoms    FINAL IMPRESSION  1.   1. COVID-19                   Electronically signed by: Daniel Hammonds M.D., 9/1/2022 8:37 PM

## 2022-10-24 ENCOUNTER — TELEPHONE (OUTPATIENT)
Dept: MEDICAL GROUP | Facility: PHYSICIAN GROUP | Age: 77
End: 2022-10-24
Payer: MEDICARE

## 2022-10-27 ENCOUNTER — OFFICE VISIT (OUTPATIENT)
Dept: MEDICAL GROUP | Facility: PHYSICIAN GROUP | Age: 77
End: 2022-10-27
Payer: MEDICARE

## 2022-10-27 VITALS
BODY MASS INDEX: 21.06 KG/M2 | DIASTOLIC BLOOD PRESSURE: 80 MMHG | WEIGHT: 134.2 LBS | TEMPERATURE: 99.2 F | SYSTOLIC BLOOD PRESSURE: 122 MMHG | HEIGHT: 67 IN | RESPIRATION RATE: 16 BRPM | HEART RATE: 86 BPM | OXYGEN SATURATION: 97 %

## 2022-10-27 DIAGNOSIS — E55.9 VITAMIN D DEFICIENCY: ICD-10-CM

## 2022-10-27 DIAGNOSIS — I48.91 ATRIAL FIBRILLATION, UNSPECIFIED TYPE (HCC): ICD-10-CM

## 2022-10-27 DIAGNOSIS — E78.2 MIXED HYPERLIPIDEMIA: ICD-10-CM

## 2022-10-27 DIAGNOSIS — R39.15 URINARY URGENCY: ICD-10-CM

## 2022-10-27 DIAGNOSIS — Z12.11 SCREEN FOR COLON CANCER: ICD-10-CM

## 2022-10-27 LAB
APPEARANCE UR: CLEAR
BILIRUB UR STRIP-MCNC: NEGATIVE MG/DL
COLOR UR AUTO: YELLOW
GLUCOSE UR STRIP.AUTO-MCNC: NEGATIVE MG/DL
KETONES UR STRIP.AUTO-MCNC: NEGATIVE MG/DL
LEUKOCYTE ESTERASE UR QL STRIP.AUTO: NEGATIVE
NITRITE UR QL STRIP.AUTO: NEGATIVE
PH UR STRIP.AUTO: 5.5 [PH] (ref 5–8)
PROT UR QL STRIP: NEGATIVE MG/DL
RBC UR QL AUTO: NORMAL
SP GR UR STRIP.AUTO: 1.02
UROBILINOGEN UR STRIP-MCNC: 0.2 MG/DL

## 2022-10-27 PROCEDURE — 99214 OFFICE O/P EST MOD 30 MIN: CPT | Performed by: NURSE PRACTITIONER

## 2022-10-27 PROCEDURE — 81002 URINALYSIS NONAUTO W/O SCOPE: CPT | Performed by: NURSE PRACTITIONER

## 2022-10-27 ASSESSMENT — PATIENT HEALTH QUESTIONNAIRE - PHQ9: CLINICAL INTERPRETATION OF PHQ2 SCORE: 0

## 2022-10-27 NOTE — ASSESSMENT & PLAN NOTE
Chronic and stable.  Pulse is well controlled in the 80s and 90s.  She denies palpitations or dizziness.  She was followed by cardiology at [facility].

## 2022-10-27 NOTE — PROGRESS NOTES
Chief Complaint   Patient presents with    UTI     Frequency, urgency, states she finished antibiotic Bactrim but she is still having symptoms        HISTORY OF PRESENT ILLNESS: Patient is a 77 y.o. female, established patient who presents today to discuss medical problems as listed below:    Health Maintenance:  COMPLETED     Urinary urgency  Patient is here for UTI follow-up.  Still experiencing urgency.  On 10/19/2022 she was seen at urgent care and prescribed Bactrim.  Her POCT UA today is with trace blood, otherwise normal.  He has no other symptoms.     Atrial fibrillation (HCC)  Chronic and stable.  Pulse is well controlled in the 80s and 90s.  She denies palpitations or dizziness.  She was followed by cardiology at [facility].    Patient Active Problem List    Diagnosis Date Noted    Urinary urgency 10/27/2022    Constipation 07/21/2020    Hospital discharge follow-up 07/02/2020    S/P mitral valve repair 06/25/2020    Anxiety 05/29/2020    Mixed hyperlipidemia 05/15/2020    Atrial fibrillation (HCC) 05/07/2020    Nausea 05/07/2020    Severe mitral regurgitation 05/07/2020    Other hemorrhoids 04/21/2020        Allergies: Keflex    No current outpatient medications on file.     No current facility-administered medications for this visit.       Social History     Tobacco Use    Smoking status: Never    Smokeless tobacco: Never   Vaping Use    Vaping Use: Never used   Substance Use Topics    Alcohol use: No    Drug use: Never     Social History     Social History Narrative    Not on file       Family History   Problem Relation Age of Onset    Lung Disease Mother     Alzheimer's Disease Father     Diabetes Maternal Grandmother        Allergies, past medical history, past surgical history, family history, social history reviewed and updated.    Review of Systems:     - Constitutional: Negative for fever, chills, unexpected weight change, and fatigue/generalized weakness.     - HEENT: Negative for headaches, vision  "changes, hearing changes, ear pain, ear discharge, rhinorrhea, sinus congestion, sore throat, and neck pain.      - Respiratory: Negative for cough, sputum production, chest congestion, dyspnea, wheezing, and crackles.      - Cardiovascular: Negative for chest pain, palpitations, orthopnea, and bilateral lower extremity edema.     - Gastrointestinal: Negative for heartburn, nausea, vomiting, abdominal pain, hematochezia, melena, diarrhea, constipation, and greasy/foul-smelling stools.     - Genitourinary: Negative for dysuria, polyuria, hematuria, pyuria, urinary urgency, and urinary incontinence.    - Musculoskeletal: Negative for myalgias, back pain, and joint pain.     - Skin: Negative for rash, itching, cyanotic skin color change.     - Neurological: Negative for dizziness, tingling, tremors, focal sensory deficit, focal weakness and headaches.     - Endo/Heme/Allergies: Does not bruise/bleed easily.     - Psychiatric/Behavioral: Negative for depression, suicidal/homicidal ideation and memory loss.      All other systems reviewed and are negative    Exam:    /80   Pulse 86   Temp 37.3 °C (99.2 °F) (Temporal)   Resp 16   Ht 1.702 m (5' 7\")   Wt 60.9 kg (134 lb 3.2 oz)   SpO2 97%   BMI 21.02 kg/m²  Body mass index is 21.02 kg/m².    Physical Exam:  Constitutional: Well-developed and well-nourished. Not diaphoretic. No distress.   Skin: Skin is warm and dry. No rash noted.  Head: Atraumatic without lesions.  Eyes: Conjunctivae and extraocular motions are normal. Pupils are equal, round, and reactive to light. No scleral icterus.   Ears:  External ears unremarkable. Tympanic membranes clear and intact.  Nose: Nares patent. Septum midline. Turbinates without erythema nor edema. No discharge.   Mouth/Throat: Dentition is normal. Tongue normal. Oropharynx is clear and moist. Posterior pharynx without erythema or exudates.  Neck: Supple, trachea midline. Normal range of motion. No thyromegaly present. No " lymphadenopathy--cervical or supraclavicular.  Cardiovascular: Regular rate and rhythm, S1 and S2 without murmur, rubs, or gallops.    Chest: Effort normal. Clear to auscultation throughout. No adventitious sounds. No CVA tenderness.  Abdomen: Soft, non tender, and without distention. Active bowel sounds in all four quadrants. No rebound, guarding, masses or HSM.  : Negative for dysuria, polyuria, hematuria, pyuria, urinary urgency, and urinary incontinence.  Extremities: No cyanosis, clubbing, erythema, nor edema. Distal pulses intact and symmetric.   Musculoskeletal: All major joints AROM full in all directions without pain.  Neurological: Alert and oriented x 3. DTRs 2+/3 and symmetric. No cranial nerve deficit. 5/5 myotomes. Sensation intact. Negative Rhomberg.  Psychiatric:  Behavior, mood, and affect are appropriate.  MA/nursing note and vitals reviewed.    LABS: 2020  results reviewed and discussed with the patient, questions answered.    Assessment/Plan:  1. Urinary urgency  - POCT Urinalysis    2. Screen for colon cancer  - COLOGUARD (FIT DNA)    3. Atrial fibrillation, unspecified type (HCC)         Discussed with patient possible alternative diagnoses, patient is to take all medications as prescribed.      If symptoms persist FU w/PCP, if symptoms worsen go to emergency room.      If experiencing any side effects from prescribed medications report to the office immediately or go to emergency room.     Reviewed indication, dosage, usage and potential adverse effects of prescribed medications.      Reviewed risks and benefits of treatment plan. Patient verbalizes understanding of all instruction and verbally agrees to plan.     Discussed plan with the patient, and patient agrees to the above.      I personally reviewed prior external notes and test results pertinent to today's visit.      No follow-ups on file. biannual

## 2022-10-27 NOTE — ASSESSMENT & PLAN NOTE
Patient is here for UTI follow-up.  Still experiencing urgency.  On 10/19/2022 she was seen at urgent care and prescribed Bactrim.  Her POCT UA today is with trace blood, otherwise normal.  He has no other symptoms.

## 2022-11-07 ENCOUNTER — PATIENT MESSAGE (OUTPATIENT)
Dept: HEALTH INFORMATION MANAGEMENT | Facility: OTHER | Age: 77
End: 2022-11-07

## 2022-11-30 ENCOUNTER — APPOINTMENT (OUTPATIENT)
Dept: LAB | Facility: MEDICAL CENTER | Age: 77
End: 2022-11-30
Attending: NURSE PRACTITIONER
Payer: MEDICARE

## 2023-04-27 ENCOUNTER — TELEPHONE (OUTPATIENT)
Dept: HEALTH INFORMATION MANAGEMENT | Facility: OTHER | Age: 78
End: 2023-04-27
Payer: MEDICARE

## 2023-08-16 NOTE — FACE TO FACE
Face to Face Supporting Documentation - Home Health    The encounter with this patient was in whole or in part the primary reason for home health admission.    Date of encounter:   Patient:                    MRN:                       YOB: 2018  Frances Sanchez  [id number]  1945     Home health to see patient for:  Skilled Nursing care for assessment, interventions & education    Skilled need for:  Surgical Aftercare s/p mitral valve repair and Medication Management Coumadin and home INR draws    Skilled nursing interventions to include:  Wound Care, Line/Drain/Airway education and care and Comment: INR draws    Homebound status evidenced by:  Have a condition such that leaving his or her home is medically contraindicated. Leaving home requires a considerable and taxing effort. There is a normal inability to leave the home.    Community Physician to provide follow up care: PARK Baez     Optional Interventions? No      I certify the face to face encounter for this home health care referral meets the CMS requirements and the encounter/clinical assessment with the patient was, in whole, or in part, for the medical condition(s) listed above, which is the primary reason for home health care. Based on my clinical findings: the service(s) are medically necessary, support the need for home health care, and the homebound criteria are met.  I certify that this patient has had a face to face encounter by myself.  Margy Dunbar P.A.-C. - NPI: [id number]     Sw received a consult to assist with counseling. Sw called Patient ([phone]). No one answered and voice mail was not set up. Ti unable to contact Patient to discuss counseling resources.    Mellisa Escobedo LCSW    [phone]

## 2023-09-21 ENCOUNTER — TELEPHONE (OUTPATIENT)
Dept: HEALTH INFORMATION MANAGEMENT | Facility: OTHER | Age: 78
End: 2023-09-21
Payer: MEDICARE

## 2023-10-22 NOTE — PROGRESS NOTES
12 Hour Chart Check       Lactic acidosis likely iso sepsis  - Will hold off on fluids given hypernatremia correction  - per GOC discussions, currently holding off on further blood-draws

## 2023-10-25 ENCOUNTER — OFFICE VISIT (OUTPATIENT)
Dept: MEDICAL GROUP | Facility: MEDICAL CENTER | Age: 78
End: 2023-10-25
Payer: MEDICARE

## 2023-10-25 VITALS
OXYGEN SATURATION: 97 % | RESPIRATION RATE: 16 BRPM | SYSTOLIC BLOOD PRESSURE: 128 MMHG | BODY MASS INDEX: 21.35 KG/M2 | DIASTOLIC BLOOD PRESSURE: 66 MMHG | HEART RATE: 86 BPM | TEMPERATURE: 98.1 F | HEIGHT: 67 IN | WEIGHT: 136 LBS

## 2023-10-25 DIAGNOSIS — I48.91 ATRIAL FIBRILLATION, UNSPECIFIED TYPE (HCC): ICD-10-CM

## 2023-10-25 DIAGNOSIS — I27.29 OTHER SECONDARY PULMONARY HYPERTENSION (HCC): ICD-10-CM

## 2023-10-25 DIAGNOSIS — M54.2 NECK PAIN: ICD-10-CM

## 2023-10-25 PROCEDURE — 3074F SYST BP LT 130 MM HG: CPT | Performed by: FAMILY MEDICINE

## 2023-10-25 PROCEDURE — 99214 OFFICE O/P EST MOD 30 MIN: CPT | Performed by: FAMILY MEDICINE

## 2023-10-25 PROCEDURE — 3078F DIAST BP <80 MM HG: CPT | Performed by: FAMILY MEDICINE

## 2023-10-25 RX ORDER — TIZANIDINE 4 MG/1
4 TABLET ORAL EVERY 6 HOURS PRN
Qty: 30 TABLET | Refills: 0 | Status: SHIPPED | OUTPATIENT
Start: 2023-10-25

## 2023-10-25 ASSESSMENT — ENCOUNTER SYMPTOMS
SENSORY CHANGE: 0
WEAKNESS: 0
CHILLS: 0
TINGLING: 0
FOCAL WEAKNESS: 0
NECK PAIN: 1
FEVER: 0

## 2023-10-25 ASSESSMENT — PATIENT HEALTH QUESTIONNAIRE - PHQ9: CLINICAL INTERPRETATION OF PHQ2 SCORE: 0
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
## 2023-10-25 NOTE — ASSESSMENT & PLAN NOTE
Chronic problem, stable, continue to monitor for any worsening symptoms.  If she develops any symptoms then repeat echo.
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
## 2023-10-30 ENCOUNTER — APPOINTMENT (OUTPATIENT)
Dept: RADIOLOGY | Facility: MEDICAL CENTER | Age: 78
End: 2023-10-30
Attending: FAMILY MEDICINE
Payer: MEDICARE

## 2023-10-30 DIAGNOSIS — M54.2 NECK PAIN: ICD-10-CM

## 2023-10-30 PROCEDURE — 72040 X-RAY EXAM NECK SPINE 2-3 VW: CPT

## 2023-11-21 ENCOUNTER — PHYSICAL THERAPY (OUTPATIENT)
Dept: PHYSICAL THERAPY | Facility: REHABILITATION | Age: 78
End: 2023-11-21
Attending: FAMILY MEDICINE
Payer: MEDICARE

## 2023-11-21 DIAGNOSIS — M54.2 NECK PAIN: ICD-10-CM

## 2023-11-21 PROCEDURE — 97162 PT EVAL MOD COMPLEX 30 MIN: CPT

## 2023-11-21 PROCEDURE — 97014 ELECTRIC STIMULATION THERAPY: CPT

## 2023-11-21 PROCEDURE — 97110 THERAPEUTIC EXERCISES: CPT

## 2023-11-21 ASSESSMENT — ENCOUNTER SYMPTOMS
PAIN SCALE: 5
PAIN SCALE AT LOWEST: 3
PAIN SCALE AT HIGHEST: 7
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
## 2023-12-05 ENCOUNTER — APPOINTMENT (OUTPATIENT)
Dept: PHYSICAL THERAPY | Facility: REHABILITATION | Age: 78
End: 2023-12-05
Attending: FAMILY MEDICINE
Payer: MEDICARE

## 2023-12-12 ENCOUNTER — PHYSICAL THERAPY (OUTPATIENT)
Dept: PHYSICAL THERAPY | Facility: REHABILITATION | Age: 78
End: 2023-12-12
Attending: FAMILY MEDICINE
Payer: MEDICARE

## 2023-12-12 DIAGNOSIS — M54.2 NECK PAIN: ICD-10-CM

## 2023-12-12 PROCEDURE — 97110 THERAPEUTIC EXERCISES: CPT

## 2023-12-12 PROCEDURE — 97014 ELECTRIC STIMULATION THERAPY: CPT

## 2023-12-12 PROCEDURE — 97140 MANUAL THERAPY 1/> REGIONS: CPT

## 2023-12-12 NOTE — OP THERAPY DAILY TREATMENT
Outpatient Physical Therapy  DAILY TREATMENT     [facility] Physical [address].  [address]  [place] [address]  Phone:  [phone]  Fax:  [phone]    Date: 12/12/2023    Patient: Frances Sanchez  YOB: 1945  MRN: [id number]     Time Calculation    Start time: 0951  Stop time: 1050 Time Calculation (min): 59 minutes         Chief Complaint: Neck Problem    Visit #: 2    SUBJECTIVE:  Pt reports continued neck pain since initial eval, but reports fewer Has. Reports severity has decreased, but frequency is every other day. Reports continued bilateral neck stiffness that refers into the shoulders. Pt cannot describe any aggravators. Currently 5/10, R>L.    OBJECTIVE:  Current objective measures:           Therapeutic Exercises (CPT 37223):     1. Open book, painful    2. Qped t/s rotation, painful    3. T/s rotation LF w/ deep breathing, x6', added to HEP    4. Towel snag w/ self-OP, x10 ea    5. Extension snags, x5, demo UT compensation    6. Scap retractions, x5', w/ focus on UT inhibition    7. Chin tuck, not today      Therapeutic Exercise Summary: HEP:  Chin tucks, snags, scap retraction    Therapeutic Treatments and Modalities:     1. E Stim Unattended (CPT 24930), IFC w/ MHP to bilat UTs, x15'    2. Manual Therapy (CPT 20251), C4-C7 B downglide/upglide gd 2-3;T1-T6  gd 2-3->3-4, Demo slight c/s rotation improvement    Time-based treatments/modalities:    Physical Therapy Timed Treatment Charges  Manual therapy minutes (CPT 65341): 20 minutes  Therapeutic exercise minutes (CPT 46910): 23 minutes      ASSESSMENT:   Response to treatment: Progressed cervicothoracic mobility exercises. Pt demo initial low tolerance to t/s rotation, reporting neck pain and tightness. Was able to complete seated exercise well and without pain. Demo R side limitations, and UT compensation during breathing exercises and scap retraction. Progressed HEP to reflect scapular exercises,  handout provided.     PLAN/RECOMMENDATIONS:   Plan for treatment: therapy treatment to continue next visit.  Planned interventions for next visit: continue with current treatment.

## 2023-12-19 ENCOUNTER — PHYSICAL THERAPY (OUTPATIENT)
Dept: PHYSICAL THERAPY | Facility: REHABILITATION | Age: 78
End: 2023-12-19
Attending: FAMILY MEDICINE
Payer: MEDICARE

## 2023-12-19 DIAGNOSIS — M54.2 NECK PAIN: ICD-10-CM

## 2023-12-19 PROCEDURE — 97110 THERAPEUTIC EXERCISES: CPT

## 2023-12-19 PROCEDURE — 97112 NEUROMUSCULAR REEDUCATION: CPT

## 2023-12-19 PROCEDURE — 97014 ELECTRIC STIMULATION THERAPY: CPT

## 2023-12-19 NOTE — OP THERAPY DAILY TREATMENT
"  Outpatient Physical Therapy  DAILY TREATMENT     [facility] Physical [address].  [address]  [place] [address]  Phone:  [phone]  Fax:  [phone]    Date: 12/19/2023    Patient: Frances Sanchez  YOB: 1945  MRN: [id number]     Time Calculation    Start time: 0945  Stop time: 1050 Time Calculation (min): 65 minutes         Chief Complaint: Neck Pain    Visit #: 3    SUBJECTIVE:  Pt reports increased personal stress causing muscle tightness. Reports continued HAs at similar frequency, every other day. Sometimes gets woken up at night d/t headaches. Sometimes takes an hour to fall back asleep. Currently reports no HA, but has neck pain, 5/10     Aggs: driving, looking over shoulder    OBJECTIVE:  Current objective measures:           Therapeutic Exercises (CPT 11981):     1. Self-SOR w/ 1/2 FR, x2', added to HEP    2. Diaphragmatic breathing, x5'    3. Supine chin tuck, x15\" hold, Demo SCM compensation    4. Upper c/s nods, in seated, 10x10\" holds      Therapeutic Exercise Summary: HEP:  Chin tucks, snags, scap retraction    Therapeutic Treatments and Modalities:     1. E Stim Unattended (CPT 66705), IFC w/ MHP to bilat UTs, x15'    2. Manual Therapy (CPT 34840), SOR w/ raking, C4-C7 B downglide/upglide gd 2-3    Time-based treatments/modalities:    Physical Therapy Timed Treatment Charges  Neuromusc re-ed, balance, coor, post minutes (CPT 52448): 23 minutes  Therapeutic exercise minutes (CPT 34056): 20 minutes        ASSESSMENT:   Response to treatment: Continued c/s mobility and DNF strengthening exercises. Pt demo overall SCM and UT overdrive during postural exercises. Demo good carryover throughout session with self-palpation for external feedback. Pt ed conducted regarding stress in relation to pain and prognosis, will be beneficial to continue deep/diaphragmatic breathing exercises. Pt gave verbal understanding. Pt is progressing at this time and appropriate to " cont PT.    PLAN/RECOMMENDATIONS:   Plan for treatment: therapy treatment to continue next visit.  Planned interventions for next visit: continue with current treatment.

## 2023-12-26 ENCOUNTER — PHYSICAL THERAPY (OUTPATIENT)
Dept: PHYSICAL THERAPY | Facility: REHABILITATION | Age: 78
End: 2023-12-26
Attending: FAMILY MEDICINE
Payer: MEDICARE

## 2023-12-26 DIAGNOSIS — M54.2 NECK PAIN: ICD-10-CM

## 2023-12-26 PROCEDURE — 97112 NEUROMUSCULAR REEDUCATION: CPT

## 2023-12-26 PROCEDURE — 97110 THERAPEUTIC EXERCISES: CPT

## 2023-12-26 PROCEDURE — 97140 MANUAL THERAPY 1/> REGIONS: CPT

## 2023-12-26 NOTE — OP THERAPY PROGRESS SUMMARY
"  Outpatient Physical Therapy  PROGRESS SUMMARY NOTE      [facility] Physical Therapy [address].  [address]  [place] [address]  Phone:  [phone]  Fax:  [phone]    Date of Visit: 12/26/2023    Patient: Frances Sanchez  YOB: 1945  MRN: [id number]     Referring Provider: To Lima M.D.  [address]  [address]  [place] [address]   Referring Diagnosis Cervicalgia [M54.2]     Visit Diagnoses     ICD-10-CM   1. Neck pain  M54.2       Rehab Potential: good    Progress Report Period: 11/21/23 to 12/26/23    Functional Assessment Used          Objective Findings and Assessment:   Patient progression towards goals: Pt demo overall functional improvements. Demo improved DNF endurance, slight improved c/s ROM. Pt continues to demo poor DNF proprioception/control, however demo decreased SCM and UT overdrive. Impairments continue to include difficulty completing daily tasks and driving. The patient will benefit from continued skilled therapy with focus on strength and ROM deficits in order to increase participation with daily tasks. The pt states she understands and agrees to the POC.     Objective findings and assessment details: DNF endurance: 17.54 seconds    C/s ROM  Flexion: WFL, painful on L side of neck \"feels like a pull\"  Ext: 33 dg, not painful  LLF: WFL  RLF: decreased, painful  L rot: 35 dg, painful - 44 dg eos  R rot: 51 dg, painful      Goals:   Short Term Goals:   1. Pt will be complaint with HEP 3-5x per week for improving ROM, strength and decreasing pain - met  2. Pt will c/s ROM WNL in order to look over shoulder while driving - not met  Short term goal time span:  2-4 weeks      Long Term Goals:    1. Pt will demonstrate improved DNF endurance with 20 second improvement or better from assessment - not met, progressing  2. Pt will report frequency of HAs no greater than 1x weekly for improved QoL - not met, progressing  3. Pt will report ability to " sleep uninterrupted for improved QoL - met, progressing  Long term goal time span:  4-6 weeks    Plan:   Planned therapy interventions:  E Stim Unattended (CPT 31080), Manual Therapy (CPT 53705), Mechanical Traction (CPT 39226), Self Care ADL Training (CPT 69285), Therapeutic Activities (CPT 80407), Therapeutic Exercise (CPT 46926) and Neuromuscular Re-education (CPT 00529)  Frequency:  2x week  Duration in visits:  6      Referring provider co-signature:  I have reviewed this plan of care and my co-signature certifies the need for services.     Certification Period: 12/26/2023 to 03/25/24    Physician Signature: ________________________________ Date: ______________

## 2023-12-26 NOTE — OP THERAPY DAILY TREATMENT
"  Outpatient Physical Therapy  DAILY TREATMENT     [facility] Physical [address].  [address]  [place] [address]  Phone:  [phone]  Fax:  [phone]    Date: 12/26/2023    Patient: Frances Sanchez  YOB: 1945  MRN: [id number]     Time Calculation    Start time: 0945  Stop time: 1050 Time Calculation (min): 65 minutes         Chief Complaint: Neck Pain    Visit #: 4    SUBJECTIVE:  Pt reports neck pain is overall improved, \"feels like it's loosening up\". Continues to report HAs, but has reduced to 3x weekly. Reports decreased intensity. Reports having fear with driving since [place].    Aggs: turning head      OBJECTIVE:  Current objective measures: See progress note          Therapeutic Exercises (CPT 77757):     1. Upper c/s nods, in supine, 5x15\" hold    2. Supine chin tuck w/ lift, to fatigue    3. Scap retraction w/ chin tuck, 3x15\" hold    4. Supine chin tuck w/ towel roll, 10\" holds w/ increasing poressure x 10, mimicking pressure cuff retraining      Therapeutic Exercise Summary: HEP:  Chin tucks, snags, scap retraction    Therapeutic Treatments and Modalities:     1. E Stim Unattended (CPT 05516), IFC w/ MHP to bilat UTs, x15'    2. Manual Therapy (CPT 86695), SOR w/ raking; STM c/s paraspinals/LUT; strain counterstrain L UT    3. Neuromuscular Re-education (CPT 28289), See below    Therapeutic Treatment and Modalities Summary: NMC:  -CCF proprioceptive retraining w/ pressure cuff for external feedback, 20-30 mmHg x10\" holds, required rest breaks in between.    Pt only able to complete up to 26 mmHg, required cueing for breathing    Time-based treatments/modalities:    Physical Therapy Timed Treatment Charges  Manual therapy minutes (CPT 53042): 13 minutes  Neuromusc re-ed, balance, coor, post minutes (CPT 51058): 10 minutes  Therapeutic exercise minutes (CPT 90723): 20 minutes      ASSESSMENT:   Response to treatment: See progress note    PLAN/RECOMMENDATIONS: "   Plan for treatment: therapy treatment to continue next visit.  Planned interventions for next visit: continue with current treatment.

## 2024-01-02 ENCOUNTER — APPOINTMENT (OUTPATIENT)
Dept: PHYSICAL THERAPY | Facility: REHABILITATION | Age: 79
End: 2024-01-02
Attending: FAMILY MEDICINE
Payer: MEDICARE

## 2024-01-09 ENCOUNTER — PHYSICAL THERAPY (OUTPATIENT)
Dept: PHYSICAL THERAPY | Facility: REHABILITATION | Age: 79
End: 2024-01-09
Attending: FAMILY MEDICINE
Payer: MEDICARE

## 2024-01-09 ENCOUNTER — TELEPHONE (OUTPATIENT)
Dept: PHYSICAL THERAPY | Facility: REHABILITATION | Age: 79
End: 2024-01-09

## 2024-01-09 DIAGNOSIS — M54.2 NECK PAIN: ICD-10-CM

## 2024-01-09 PROCEDURE — 97110 THERAPEUTIC EXERCISES: CPT

## 2024-01-09 PROCEDURE — 97140 MANUAL THERAPY 1/> REGIONS: CPT

## 2024-01-09 NOTE — OP THERAPY DAILY TREATMENT
"  Outpatient Physical Therapy  DAILY TREATMENT     [facility] Physical [address].  [address]  [place] [address]  Phone:  [phone]  Fax:  [phone]    Date: 01/09/2024    Patient: Frances Sanchez  YOB: 1945  MRN: [id number]     Time Calculation    Start time: 0945  Stop time: 1024 Time Calculation (min): 39 minutes         Chief Complaint: Neck Pain    Visit #: 5    SUBJECTIVE:  Pt reports neck feels tight this morning. Does not have current HA, but has been getting HAs daily. Reports difficulty sleeping, is woken up nightly, usually 2x nightly. Describes symptoms as constant.     OBJECTIVE:  Current objective measures:   BP: 82/44 mmHg taken with arm cuff  BP: 91/59 mmHg taken w/ wrist cuff            Therapeutic Exercises (CPT 99622):     1. Supine chin tuck hold relax w/ eye movements, 3x3x5\" hold    2. Supine chin tuck, to fatigue, reports decreased trigger point pain    3. Snags, 2x10x3\" hold ea, reports dizziness      Therapeutic Exercise Summary: HEP:  Chin tucks, snags, scap retraction    Therapeutic Treatments and Modalities:     1. E Stim Unattended (CPT 37800), IFC w/ MHP to bilat UTs, x15', not today    2. Manual Therapy (CPT 20107), SOR w/ raking    3. Neuromuscular Re-education (CPT 74189), not today    Therapeutic Treatment and Modalities Summary: NMC: not today  -CCF proprioceptive retraining w/ pressure cuff for external feedback, 20-30 mmHg x10\" holds, required rest breaks in between.    Pt only able to complete up to 26 mmHg, required cueing for breathing    Time-based treatments/modalities:    Physical Therapy Timed Treatment Charges  Manual therapy minutes (CPT 97231): 10 minutes  Therapeutic exercise minutes (CPT 72988): 29 minutes      ASSESSMENT:   Response to treatment: Pt reported some dizziness during session, demo low BP taken both with wrist cuff and brachial cuff. Recommend going to urgent care for follow up. Pt gave verbal understanding. " Also sent intra office message to primary care.    Prior to symptoms of dizziness, continued progression of CCF endurance/postural strengthening exercises. Pt demo good carryover of mechanics, decreased SCM overdrive. Was able to reduce symptoms in head with chin tuck.     PLAN/RECOMMENDATIONS:   Plan for treatment: therapy treatment to continue next visit.  Planned interventions for next visit: continue with current treatment.

## 2024-01-16 ENCOUNTER — PHYSICAL THERAPY (OUTPATIENT)
Dept: PHYSICAL THERAPY | Facility: REHABILITATION | Age: 79
End: 2024-01-16
Attending: FAMILY MEDICINE
Payer: MEDICARE

## 2024-01-16 DIAGNOSIS — M54.2 NECK PAIN: ICD-10-CM

## 2024-01-16 PROCEDURE — 97112 NEUROMUSCULAR REEDUCATION: CPT

## 2024-01-16 PROCEDURE — 97140 MANUAL THERAPY 1/> REGIONS: CPT

## 2024-01-16 PROCEDURE — 97110 THERAPEUTIC EXERCISES: CPT

## 2024-01-16 NOTE — OP THERAPY DAILY TREATMENT
"  Outpatient Physical Therapy  DAILY TREATMENT     [facility] Physical [address].  [address]  [place] [address]  Phone:  [phone]  Fax:  [phone]    Date: 01/16/2024    Patient: Frances Sanchez  YOB: 1945  MRN: [id number]     Time Calculation    Start time: 0946  Stop time: 1027 Time Calculation (min): 41 minutes         Chief Complaint: Neck Pain    Visit #: 6    SUBJECTIVE:  Pt reports having reduced stressed today, no current HA. Continues to have intermittent HAs, with tightness on the L side of the neck. Currently 3/10, worst 6/10, best 2/10.    OBJECTIVE:  Current objective measures: L scapular winging          Therapeutic Exercises (CPT 85005):     1. Supine chin tuck w/ PT assisted disctraction, 4x10\" hold    2. Supine chin tuck w/ rotation, x5 ea    3. Seated chin tuck rotation, x5 ea    4. Serratus wall slide w/ pillowase, x10, added to HEP      Therapeutic Exercise Summary: HEP:  Chin tucks, snags, scap retraction, serratus wall slide    Therapeutic Treatments and Modalities:     1. E Stim Unattended (CPT 20541), IFC w/ MHP to bilat UTs, x15'    2. Manual Therapy (CPT 84831), SOR w/ raking; L upglide/downglide C4-C6 gd 2-3; UT/LS stretch    3. Neuromuscular Re-education (CPT 10911)    Therapeutic Treatment and Modalities Summary: NMC:  -Proprioceptive retraining w/ lateral/upward gaze while maintaining chin tuck, holding to fatigue - x10'    Time-based treatments/modalities:    Physical Therapy Timed Treatment Charges  Manual therapy minutes (CPT 94300): 15 minutes  Neuromusc re-ed, balance, coor, post minutes (CPT 35023): 10 minutes  Therapeutic exercise minutes (CPT 51425): 16 minutes        ASSESSMENT:   Response to treatment: Progressed DNF/CCF proprioceptive exercises. Pt tolerated well, but demo difficulty maintaining chin tuck positioning with upward and lateral gaze. Demo improved chin tuck positioning with AROM rotation. Pt is progressing at this " time and appropriate to cont PT.    PLAN/RECOMMENDATIONS:   Plan for treatment: therapy treatment to continue next visit.  Planned interventions for next visit: continue with current treatment.

## 2024-01-23 ENCOUNTER — PHYSICAL THERAPY (OUTPATIENT)
Dept: PHYSICAL THERAPY | Facility: REHABILITATION | Age: 79
End: 2024-01-23
Attending: FAMILY MEDICINE
Payer: MEDICARE

## 2024-01-23 DIAGNOSIS — M54.2 NECK PAIN: ICD-10-CM

## 2024-01-23 PROCEDURE — 97112 NEUROMUSCULAR REEDUCATION: CPT

## 2024-01-23 PROCEDURE — 97140 MANUAL THERAPY 1/> REGIONS: CPT

## 2024-01-23 NOTE — OP THERAPY DAILY TREATMENT
"  Outpatient Physical Therapy  DAILY TREATMENT     [facility] Physical Therapy [address].  [address]  [place] [address]  Phone:  [phone]  Fax:  [phone]    Date: 01/23/2024    Patient: Frances Sanchez  YOB: 1945  MRN: [id number]     Time Calculation    Start time: 0945  Stop time: 1050 Time Calculation (min): 65 minutes         Chief Complaint: Neck Pain    Visit #: 7    SUBJECTIVE:  Pt reports some improvement overall, but has not noticed any change since last visit. Reports being down about neck pain and situation. Currently 3/10 neck pain. Last HA 2 days ago, lasting 2 hours.     OBJECTIVE:  Current objective measures:           Therapeutic Exercises (CPT 09223):       Therapeutic Exercise Summary: HEP:  Chin tucks, snags, scap retraction, serratus wall slide    Therapeutic Treatments and Modalities:     1. E Stim Unattended (CPT 13627), IFC w/ MHP to bilat UTs, x15'    2. Manual Therapy (CPT 76959), SOR w/ raking; UT/LS stretch; STM to UTs    3. Neuromuscular Re-education (CPT 90398)    Therapeutic Treatment and Modalities Summary: NMC:  -Proprioceptive CCF retraining w/ pressure cuff for external feedback, 20->30 mmHg x 10\" ea - able to achieve each position, but unable to maintain > 3 seconds  -Proprioceptive retraining w/ lateral/upward gaze while maintaining chin tuck, 2x5x5\" hold  -Proprioceptive training w/ laser for external feedback; chin tuck w/ rotations eo/ec, x10' - demo overshooting x 3\"->1\"    Time-based treatments/modalities:    Physical Therapy Timed Treatment Charges  Manual therapy minutes (CPT 29561): 20 minutes  Neuromusc re-ed, balance, coor, post minutes (CPT 43253): 23 minutes      ASSESSMENT:   Response to treatment: Progressed CCF proprioceptive exercises. Pt demo good carryover and improvement with CCF pressure cuff activities. Pt was able to achieve full chin tuck positioning, but decrease control. Pt also demo good carryover and correction " with exercises utilizing laser for feedback. Pt is progressing at this time and appropriate to cont PT.    PLAN/RECOMMENDATIONS:   Plan for treatment: therapy treatment to continue next visit.  Planned interventions for next visit: continue with current treatment.

## 2024-01-30 ENCOUNTER — PHYSICAL THERAPY (OUTPATIENT)
Dept: PHYSICAL THERAPY | Facility: REHABILITATION | Age: 79
End: 2024-01-30
Attending: FAMILY MEDICINE
Payer: MEDICARE

## 2024-01-30 DIAGNOSIS — M54.2 NECK PAIN: ICD-10-CM

## 2024-01-30 PROCEDURE — 97110 THERAPEUTIC EXERCISES: CPT

## 2024-01-30 PROCEDURE — 97140 MANUAL THERAPY 1/> REGIONS: CPT

## 2024-01-30 PROCEDURE — 97014 ELECTRIC STIMULATION THERAPY: CPT

## 2024-01-30 NOTE — OP THERAPY PROGRESS SUMMARY
"  Outpatient Physical Therapy  PROGRESS SUMMARY NOTE      [facility] Physical Therapy [address].  [address]  [place] [address]  Phone:  [phone]  Fax:  [phone]    Date of Visit: 01/30/2024    Patient: Frances Sanchez  YOB: 1945  MRN: [id number]     Referring Provider: To Lima M.D.  [address]  [address]  [place] [address]   Referring Diagnosis Cervicalgia [M54.2]     Visit Diagnoses     ICD-10-CM   1. Neck pain  M54.2       Rehab Potential: good    Progress Report Period: 12/26/23 to 1/30/24    Functional Assessment Used          Objective Findings and Assessment:   Patient progression towards goals: Pt continues to demo functional improvements. Demo improved DNF endurance and c/s ROM, however rotation continues to be painful at end-ranges. Demo improved DNF proprioception/control, but demo difficulty maintaining >10 seconds. Impairments continue to include difficulty completing daily tasks and driving. The patient will benefit from continued skilled therapy with focus on strength and ROM deficits in order to increase participation with daily tasks. Discussed POC, pt will trial independent with HEP with follow-up in a few weeks to assess tolerance. The pt states she understands and agrees to the POC.     Objective findings and assessment details: DNF endurance: 22.68 seconds     C/s ROM  Flexion: WFL, \"tight\" in shoulders  Ext: 43 dg  LLF: WFL  RLF: decreased, painful  L rot: 45 dg, painful  R rot: 51 dg, painful    Goals:   Short Term Goals:   1. Pt will be complaint with HEP 3-5x per week for improving ROM, strength and decreasing pain - met  2. Pt will c/s ROM WNL in order to look over shoulder while driving - partially met, progressing. Reports \"catching\"  Short term goal time span:  2-4 weeks      Long Term Goals:    1. Pt will demonstrate improved DNF endurance with 20 second improvement or better from assessment - partially met, progressing  2. " Pt will report frequency of HAs no greater than 1x weekly for improved QoL - not met, progressing  3. Pt will report ability to sleep uninterrupted for improved QoL - met, progressing  Long term goal time span:  4-6 weeks    Plan:   Planned therapy interventions:  E Stim Unattended (CPT 40720), Manual Therapy (CPT 09555), Mechanical Traction (CPT 65987), Self Care ADL Training (CPT 19513), Therapeutic Activities (CPT 33965), Therapeutic Exercise (CPT 71884) and Neuromuscular Re-education (CPT 59954)  Frequency:  1x week  Duration in visits:  4      Referring provider co-signature:  I have reviewed this plan of care and my co-signature certifies the need for services.     Certification Period: 01/30/2024 to 04/29/24    Physician Signature: ________________________________ Date: ______________

## 2024-01-30 NOTE — OP THERAPY DISCHARGE SUMMARY
Outpatient Physical Therapy  DISCHARGE SUMMARY NOTE      [facility] Physical Therapy [address].  [address]  [place] [address]  Phone:  [phone]  Fax:  [phone]    Date of Visit: 01/30/2024    Patient: Frances Sanchez  YOB: 1945  MRN: [id number]     Referring Provider: To Lima M.D.  [address]  [address]  [place] [address]   Referring Diagnosis Cervicalgia [M54.2]         Functional Assessment Used        Your patient is being discharged from Physical Therapy with the following comments:   {THERAPY DISCHARGE REASON:[id number]:p}    Comments:  Pt has attended 8 visits from 11/21 to 1/30/24. Pt has met all functional goals and is appropriate for DC. Reviewed HEP. Pt demo full independence and proper mechanics of all exercises.        Recommendations:  Continue independently with HEP    Viviana Cheatham, PT, DPT    Date: 1/30/2024

## 2024-01-30 NOTE — OP THERAPY DAILY TREATMENT
"  Outpatient Physical Therapy  DAILY TREATMENT     [facility] Physical [address].  [address]  [place] [address]  Phone:  [phone]  Fax:  [phone]    Date: 01/30/2024    Patient: Frances Sanchez  YOB: 1945  MRN: [id number]     Time Calculation    Start time: 0945  Stop time: 1050 Time Calculation (min): 65 minutes         Chief Complaint: Neck Pain    Visit #: 8    SUBJECTIVE:  Pt reports decreased intensity of Has, but no reduction in frequency. Reports both R and L side pain at the base of the skull that worsens with rotation. Reports improved ability to look over the shoulder while driving, but \"catches\". Reports feeling 65% WNL.         OBJECTIVE:  Current objective measures: See progress note          Therapeutic Exercises (CPT 18298):     1. Supine rotation AROM w/ self-OP, x5 ea    2. Supine rotations w/ chin tuck at end range, 5x5\" hold ea    3. Chin tuck with lift, to fatigue    4. Self massage w/ theracane, x2'    5. Rows, Is, Ts w/ OTB, x10 ea      Therapeutic Exercise Summary: HEP:  Chin tucks, snags, scap retraction, serratus wall slide    Therapeutic Treatments and Modalities:     1. E Stim Unattended (CPT 72732), IFC w/ MHP to bilat UTs, x15'    2. Manual Therapy (CPT 41126), SOR w/ raking; strain counter strain R SCM    3. Neuromuscular Re-education (CPT 05942), not today    Therapeutic Treatment and Modalities Summary: NMC: not today  -Proprioceptive CCF retraining w/ pressure cuff for external feedback, 20->30 mmHg x 10\" ea - able to achieve each position, but unable to maintain > 3 seconds  -Proprioceptive retraining w/ lateral/upward gaze while maintaining chin tuck, 2x5x5\" hold  -Proprioceptive training w/ laser for external feedback; chin tuck w/ rotations eo/ec, x10' - demo overshooting x 3\"->1\"    Time-based treatments/modalities:    Physical Therapy Timed Treatment Charges  Manual therapy minutes (CPT 24314): 10 minutes  Therapeutic exercise " minutes (CPT 87904): 31 minutes        ASSESSMENT:   Response to treatment: See progress note    PLAN/RECOMMENDATIONS:   Plan for treatment: therapy treatment to continue next visit.  Planned interventions for next visit: continue with current treatment.

## 2024-03-05 ENCOUNTER — APPOINTMENT (OUTPATIENT)
Dept: MEDICAL GROUP | Facility: MEDICAL CENTER | Age: 79
End: 2024-03-05
Payer: MEDICARE

## 2024-03-12 ENCOUNTER — PHYSICAL THERAPY (OUTPATIENT)
Dept: PHYSICAL THERAPY | Facility: REHABILITATION | Age: 79
End: 2024-03-12
Attending: FAMILY MEDICINE
Payer: MEDICARE

## 2024-03-12 DIAGNOSIS — M54.2 NECK PAIN: ICD-10-CM

## 2024-03-12 PROCEDURE — 97140 MANUAL THERAPY 1/> REGIONS: CPT

## 2024-03-12 PROCEDURE — 97110 THERAPEUTIC EXERCISES: CPT

## 2024-03-12 NOTE — OP THERAPY PROGRESS SUMMARY
"  Outpatient Physical Therapy  PROGRESS SUMMARY NOTE      [facility] Physical Therapy [address].  [address]  [place] [address]  Phone:  [phone]  Fax:  [phone]    Date of Visit: 03/12/2024    Patient: Frances Sanchez  YOB: 1945  MRN: [id number]     Referring Provider: To Lima M.D.  [address]  [address]  [place] [address]   Referring Diagnosis Cervicalgia [M54.2]     Visit Diagnoses     ICD-10-CM   1. Neck pain  M54.2       Rehab Potential: good    Progress Report Period: 1/30/24 to 3/12/24    Functional Assessment Used  Neck Disability Total: 9       Objective Findings and Assessment:   Patient progression towards goals: Pt continues to demo functional and self-reported improvements. Demo improved c/s ROM, but continues to demo decreased DNF endurance. Pt demo good trial of independent with HEP, reporting continued improvements. Impairments continue to include difficulty completing daily tasks and sleeping. The patient will benefit from continued skilled therapy with focus on DNF endurance and ROM deficits in order to improve quality of sleep and participation with daily tasks. The pt states she understands and agrees to the POC.     Objective findings and assessment details: DNF endurance: 20 seconds     C/s ROM  Flexion: WFL, \"tight\" in shoulders  Ext: 34 dg  LLF: WFL  RLF: decreased, painful  L rot: 45 dg, \"tight\"  R rot: 55 dg, \"tight\"    Goals:   Short Term Goals:   1. Pt will be complaint with HEP 3-5x per week for improving ROM, strength and decreasing pain - met  2. Pt will c/s ROM WNL in order to look over shoulder while driving - met  Short term goal time span:  2-4 weeks      Long Term Goals:    1. Pt will demonstrate improved DNF endurance with 20 second improvement or better from assessment - partially met, progressing  2. Pt will report frequency of HAs no greater than 1x weekly for improved QoL - met  3. Pt will report ability to sleep " uninterrupted for improved QoL - met, progressing  Long term goal time span:  4-6 weeks    Plan:   Planned therapy interventions:  E Stim Unattended (CPT 89578), Manual Therapy (CPT 93563), Mechanical Traction (CPT 31896), Self Care ADL Training (CPT 85231), Therapeutic Activities (CPT 79789), Therapeutic Exercise (CPT 69181) and Neuromuscular Re-education (CPT 16941)  Frequency:  1x week  Duration in visits:  4      Referring provider co-signature:  I have reviewed this plan of care and my co-signature certifies the need for services.     Certification Period: 03/12/2024 to 06/10/24    Physician Signature: ________________________________ Date: ______________

## 2024-03-12 NOTE — OP THERAPY DAILY TREATMENT
"  Outpatient Physical Therapy  DAILY TREATMENT     [facility] Physical [address]  [address]  [place] [address]  Phone:  [phone]  Fax:  [phone]    Date: 03/12/2024    Patient: Frances Sanchez  YOB: 1945  MRN: [id number]     Time Calculation    Start time: 1415  Stop time: 1455 Time Calculation (min): 40 minutes         Chief Complaint: Neck Pain    Visit #: 9    SUBJECTIVE:  Pt reports neck pain is much improved, but continues to have intermittent \"bad days\". Neck pain can still get to 4/10. Reports HA frequency has decreased drastically.        OBJECTIVE:  Current objective measures: See progress note    Neck Disability Total: 9       Therapeutic Exercises (CPT 48914):     1. Supine chin tuck, 10x5\" hold    2. Chin tuck with lift, to fatigue    3. Chin tuck w/ lateral/upward gaze, x10 ea    4. Supine rotations w/ chin tuck at end range, not today    5. Rows, x10 ea    6. Is, Ts, not today      Therapeutic Exercise Summary: HEP:  Chin tucks, snags, scap retraction, serratus wall slide, AROM w/ OP, diaphragmatic breathing    Therapeutic Treatments and Modalities:     1. E Stim Unattended (CPT 29528), IFC w/ MHP to bilat UTs, x15'    Time-based treatments/modalities:    Physical Therapy Timed Treatment Charges  Manual therapy minutes (CPT 07273): 15 minutes  Therapeutic exercise minutes (CPT 96467): 25 minutes        ASSESSMENT:   Response to treatment: See progress note    PLAN/RECOMMENDATIONS:   Plan for treatment: therapy treatment to continue next visit.  Planned interventions for next visit: continue with current treatment.         "

## 2024-04-08 ENCOUNTER — PHYSICAL THERAPY (OUTPATIENT)
Dept: PHYSICAL THERAPY | Facility: REHABILITATION | Age: 79
End: 2024-04-08
Attending: FAMILY MEDICINE
Payer: MEDICARE

## 2024-04-08 DIAGNOSIS — M54.2 NECK PAIN: ICD-10-CM

## 2024-04-08 PROCEDURE — 97140 MANUAL THERAPY 1/> REGIONS: CPT

## 2024-04-08 PROCEDURE — 97110 THERAPEUTIC EXERCISES: CPT

## 2024-04-08 PROCEDURE — 97112 NEUROMUSCULAR REEDUCATION: CPT

## 2024-04-08 NOTE — OP THERAPY DAILY TREATMENT
"  Outpatient Physical Therapy  DAILY TREATMENT     [facility] Physical [address].  [address]  [place] [address]  Phone:  [phone]  Fax:  [phone]    Date: 04/08/2024    Patient: Frances Sanchez  YOB: 1945  MRN: [id number]     Time Calculation    Start time: 1115  Stop time: 1158 Time Calculation (min): 43 minutes         Chief Complaint: Neck Pain    Visit #: 10    SUBJECTIVE:  Pt reports having continued neck pain and HAs, but is better than previous visit. Describes symptoms as stiffness that worsens with rotation. HAs occur ~1-2x weekly. Continues to wake 2x nightly d/t pain. Rates self at 75% wnl    Aggs: turning head  Easers: snags  Irritability: min to mod     Current pain: 0/10 \"but stiff\"  Best pain: 0/10  Worst pain: 6/10    OBJECTIVE:  Current objective measures: See progress note          Therapeutic Exercises (CPT 16052):       Therapeutic Exercise Summary: HEP:  Chin tucks, snags, scap retraction, serratus wall slide, AROM w/ OP, diaphragmatic breathing    Therapeutic Treatments and Modalities:     1. Manual Therapy (CPT 04747), CFM/SCS L UT; SOR w/ raking    2. Neuromuscular Re-education (CPT 35760)    Therapeutic Treatment and Modalities Summary: NMR:  Cervical proprioceptive retraining  -chin tuck w/ controlled opening/closing of jaw, w/ mirror for external feedback, x8'  -chin tuck w/ biofeedback cuff set at 20 mmHg to 22 mmHg, holding 10\" ea, x5' - unable to achieve > 24 mmHg, and requires max cueing  -supine chin tuck w/ towel roll for external cueing, w/ focus on proper mechanics, x3'    Time-based treatments/modalities:    Physical Therapy Timed Treatment Charges  Manual therapy minutes (CPT 48205): 15 minutes  Neuromusc re-ed, balance, coor, post minutes (CPT 44542): 28 minutes        ASSESSMENT:   Response to treatment: See progress note    PLAN/RECOMMENDATIONS:   Plan for treatment: therapy treatment to continue next visit.  Planned " interventions for next visit: continue with current treatment.

## 2024-04-08 NOTE — OP THERAPY PROGRESS SUMMARY
"  Outpatient Physical Therapy  PROGRESS SUMMARY NOTE      [facility] Physical Therapy [address].  [address]  [place] [address]  Phone:  [phone]  Fax:  [phone]    Date of Visit: 04/08/2024    Patient: Frances Sanchez  YOB: 1945  MRN: [id number]     Referring Provider: To Lima M.D.  [address]  [address]  [place] [address]   Referring Diagnosis Cervicalgia [M54.2]     Visit Diagnoses     ICD-10-CM   1. Neck pain  M54.2       Rehab Potential: good    Progress Report Period: 3/12/24 to 4/8/24    Functional Assessment Used          Objective Findings and Assessment:   Patient progression towards goals: Pt continues to demo functional improvements, however continues to poor ccf control w/ significant SOR/UT overdrive with jaw movements. Impairments continue to include continued Has and difficulty completing ADLs. The patient will benefit from continued skilled therapy with focus on strength and ROM deficits in order to increase participation with daily tasks. The pt states she understands and agrees to the POC.     Objective findings and assessment details: C/s ROM  Flexion: WFL  Ext: WFL  LLF: WFL  RLF: decreased, painful  L rot: 45 dg, \"tight\"  R rot: 55 dg, \"tight\"    CCF test:   Able to achieve 20-22 mmHg x 10 second hold; Unable to achieve > 24 mmHg    Goals:   Short Term Goals:   1. Pt will be complaint with HEP 3-5x per week for improving ROM, strength and decreasing pain - met  2. Pt will c/s ROM WNL in order to look over shoulder while driving - met  Short term goal time span:  2-4 weeks      Long Term Goals:    1. Pt will demonstrate improved DNF endurance with 20 second improvement or better from assessment - partially met, progressing  2. Pt will report frequency of HAs no greater than 1x weekly for improved QoL - partially met  3. Pt will report ability to sleep uninterrupted for improved QoL - met, progressing  Long term goal time span:  4-6 " weeks    Plan:   Planned therapy interventions:  E Stim Unattended (CPT 29775), Manual Therapy (CPT 53781), Mechanical Traction (CPT 04217), Self Care ADL Training (CPT 96465), Therapeutic Activities (CPT 40573), Therapeutic Exercise (CPT 46079) and Neuromuscular Re-education (CPT 22546)  Frequency:  1x week  Duration in visits:  6      Referring provider co-signature:  I have reviewed this plan of care and my co-signature certifies the need for services.     Certification Period: 04/08/2024 to 07/07/24    Physician Signature: ________________________________ Date: ______________

## 2024-04-30 ENCOUNTER — PHYSICAL THERAPY (OUTPATIENT)
Dept: PHYSICAL THERAPY | Facility: REHABILITATION | Age: 79
End: 2024-04-30
Attending: FAMILY MEDICINE
Payer: MEDICARE

## 2024-04-30 DIAGNOSIS — M54.2 NECK PAIN: ICD-10-CM

## 2024-04-30 PROCEDURE — 97014 ELECTRIC STIMULATION THERAPY: CPT

## 2024-04-30 PROCEDURE — 97110 THERAPEUTIC EXERCISES: CPT

## 2024-04-30 NOTE — OP THERAPY DAILY TREATMENT
"  Outpatient Physical Therapy  DAILY TREATMENT     [facility] Physical [address].  [address]  [place] [address]  Phone:  [phone]  Fax:  [phone]    Date: 04/30/2024    Patient: Frances Sanchez  YOB: 1945  MRN: [id number]     Time Calculation    Start time: 0945  Stop time: 1040 Time Calculation (min): 55 minutes         Chief Complaint: Neck Pain    Visit #: 11    SUBJECTIVE:  Pt reports overall improvements with reduced headaches. Has been sleeping better. Still wakes during the night, but not always related to neck pain. Is ready to DC today.    Short Term Goals:   1. Pt will be complaint with HEP 3-5x per week for improving ROM, strength and decreasing pain - met  2. Pt will c/s ROM WNL in order to look over shoulder while driving - met  Short term goal time span:  2-4 weeks      Long Term Goals:    1. Pt will demonstrate improved DNF endurance with 20 second improvement or better from assessment - partially met, progressing  2. Pt will report frequency of HAs no greater than 1x weekly for improved QoL - met  3. Pt will report ability to sleep uninterrupted for improved QoL - met  Long term goal time span:  4-6 weeks    OBJECTIVE:  Current objective measures:   C/s ROM  Flexion: WFL  Ext: WFL  LLF: WFL  RLF: WFL  L rot: WFL  R rot: WFL      Therapeutic Exercises (CPT 29526):     1. Supine chin tuck w/ lift, to fatigue    2. Supine chin tuck, 10x10\" hold    3. Snags, x10 ea    4. Chin tuck w/ controlled opening/closing of jaw, x3'    5. C/s rotation AROM w/ self-OP, x5 ea    6. Wall slide w/ lift off, x10, w/ chin tuck      Therapeutic Exercise Summary: HEP:  Chin tucks, snags, scap retraction, serratus wall slide, AROM w/ OP, diaphragmatic breathing    Therapeutic Treatments and Modalities:     1. E Stim Unattended (CPT 85186), IFC w/ MHP to UTs, x15'    Time-based treatments/modalities:    Physical Therapy Timed Treatment Charges  Therapeutic exercise minutes " (CPT 99103): 32 minutes        ASSESSMENT:   Response to treatment: See DC note    PLAN/RECOMMENDATIONS:   Plan for treatment: discharge patient due to accomplished goals.

## 2024-04-30 NOTE — OP THERAPY DISCHARGE SUMMARY
Outpatient Physical Therapy  DISCHARGE SUMMARY NOTE      [facility] Physical Therapy [address].  [address]  [place] [address]  Phone:  [phone]  Fax:  [phone]    Date of Visit: 04/30/2024    Patient: Frances Sanchez  YOB: 1945  MRN: [id number]     Referring Provider: To Lima M.D.  [address]  [address]  [place] [address]   Referring Diagnosis Cervicalgia [M54.2]         Functional Assessment Used  Neck Disability Total: 5     Your patient is being discharged from Physical Therapy with the following comments:   Goals met    Comments:  Pt has attended 11 visits from 11/21/23 to 4/30/24. Pt has met functional goals and is appropriate for DC. Reviewed HEP. Pt demo full independence and proper mechanics of all exercises.        Recommendations:  Continue independently with HEP    Viviana Cheatham, PT, DPT    Date: 4/30/2024

## (undated) DEVICE — PROBE CRYOMAZE - (DAVINCI)

## (undated) DEVICE — INSERT STEALTH #5 - (10/BX)

## (undated) DEVICE — SENSOR CEREBRAL AND SOMATIC MONITORING (20/CA)

## (undated) DEVICE — SUTURE 4-0 PROLENE V-7 D/A (36PK/BX)

## (undated) DEVICE — SUCTION INSTRUMENT YANKAUER BULBOUS TIP W/O VENT (50EA/CA)

## (undated) DEVICE — TUBING CLEARLINK DUO-VENT - C-FLO (48EA/CA)

## (undated) DEVICE — SET LEADWIRE 5 LEAD BEDSIDE DISPOSABLE ECG (1SET OF 5/EA)

## (undated) DEVICE — SUTURE 4-0 PROLENE RB-1 D/A 36 (36PK/BX)"

## (undated) DEVICE — TRAY SURESTEP FOLEY TEMP SENSING 16FR (10EA/CA) ORDER  #18764 FOR TEMP FOLEY ONLY

## (undated) DEVICE — SUTURE 4-0 ETHIBOND RB-1 EXCEL (12/BX)

## (undated) DEVICE — KIT RADIAL ARTERY 20GA W/MAX BARRIER AND BIOPATCH  (5EA/CA) #10740 IS FOR THE SET RADIAL ARTERIAL

## (undated) DEVICE — ORGANIZER SUTURE GABBAY-FRAT - ER STERILE (3/SET 4ST/BX)

## (undated) DEVICE — FIBRILLAR SURGICEL 4X4 - 10/CA

## (undated) DEVICE — TUBE CHEST 32FR. STRAIGHT - (10EA/CA)

## (undated) DEVICE — SUTURE 4-0 30CM STRATAFIX SPIRAL PS-2 (12EA/BX)

## (undated) DEVICE — SENSOR SPO2 NEO LNCS ADHESIVE (20/BX) SEE USER NOTES

## (undated) DEVICE — D-5-W INJ. 500 ML - (24EA/CA)

## (undated) DEVICE — SET EXTENSION WITH 2 PORTS (48EA/CA) ***PART #2C8610 IS A SUBSTITUTE*****

## (undated) DEVICE — SUTURE 2-0 ETHIBOND V-5 DOUBLE ARMED 30 (36EA/BX)"

## (undated) DEVICE — SPONGE DRAIN 4 X 4IN 6-PLY - (2/PK25PK/BX12BX/CS)

## (undated) DEVICE — TUBE CHEST 32FR. RIGHT ANGLED (10EA/CA)

## (undated) DEVICE — LEAD PACING TEMP MYO - (12/BX)

## (undated) DEVICE — DRESSING TRANSPARENT FILM TEGADERM 2.375 X 2.75"  (100EA/BX)"

## (undated) DEVICE — BAG SPONGE COUNT 10.25 X 32 - BLUE (250/CA)

## (undated) DEVICE — TRANSDUCER BIFURCATED MONITORING KIT (10EA/CA)

## (undated) DEVICE — BLADE SURGICAL #15 - (50/BX 3BX/CA)

## (undated) DEVICE — MASK ANESTHESIA ADULT  - (100/CA)

## (undated) DEVICE — GOWN WARMING STANDARD FLEX - (30/CA)

## (undated) DEVICE — PACK CV DRAPING/BASIN 2PART - (1/CA)

## (undated) DEVICE — LACTATED RINGERS INJ 1000 ML - (14EA/CA 60CA/PF)

## (undated) DEVICE — GLOVE BIOGEL INDICATOR SZ 8 SURGICAL PF LTX - (50/BX 4BX/CA)

## (undated) DEVICE — GLOVE BIOGEL PI ORTHO SZ 7.5 PF LF (40PR/BX)

## (undated) DEVICE — TUBE CONNECT SUCTION CLEAR 120 X 1/4" (50EA/CA)"

## (undated) DEVICE — NEPTUNE 4 PORT MANIFOLD - (20/PK)

## (undated) DEVICE — SET BIFURCATED BLOOD - (48EA/CS)

## (undated) DEVICE — GLOVE SZ 7 BIOGEL PI MICRO - PF LF (50PR/BX 4BX/CA)

## (undated) DEVICE — SLEEVE, VASO, THIGH, MED

## (undated) DEVICE — SODIUM CHL. INJ. 0.9% 500ML (24EA/CA 50CA/PF)

## (undated) DEVICE — KIT CATHETERIZATION TWO-LUMEN VENOUS 8FR (5EA/CA)

## (undated) DEVICE — GOWN SURGEONS X-LARGE - DISP. (30/CA)

## (undated) DEVICE — ELECTRODE RADIOLUCNT SOLID GEL DEFIB PADS (12EA/CA)

## (undated) DEVICE — PROTECTOR ULNA NERVE - (36PR/CA)

## (undated) DEVICE — HEAD HOLDER JUNIOR/ADULT

## (undated) DEVICE — SUTURE 6-0 PROLENE RB-2 D/A 30 (36PK/BX)"

## (undated) DEVICE — PACK E SUTURE USED FOR - OPEN HEART  (5/BX)

## (undated) DEVICE — DERMABOND ADVANCED - (12EA/BX)

## (undated) DEVICE — SYS DLV COST CLS RM TEMP - INJECTATE (CO-SET II) (10EA/CA)

## (undated) DEVICE — STOPCOCK MALE 4-WAY - (50/CA)

## (undated) DEVICE — KIT ANESTHESIA W/CIRCUIT & 3/LT BAG W/FILTER (20EA/CA)

## (undated) DEVICE — ELECTRODE 850 FOAM ADHESIVE - HYDROGEL RADIOTRNSPRNT (50/PK)

## (undated) DEVICE — DRESSING TRANSPARENT FILM TEGADERM 4 X 4.75" (50EA/BX)"

## (undated) DEVICE — SUTURE OHS

## (undated) DEVICE — TUBE E-T HI-LO CUFF 7.0MM (10EA/PK)

## (undated) DEVICE — ARMBOARD  SMALL IV 9 INLONG - (25EA/CA)

## (undated) DEVICE — ELECTRODE DUAL RETURN W/ CORD - (50/PK)

## (undated) DEVICE — SOD. CHL. INJ. 0.9% 1000 ML - (14EA/CA 60CA/PF)

## (undated) DEVICE — BAG RESUSCITATION DISPOSABLE - WITH MASK (10 EA/CA)

## (undated) DEVICE — BANDAGE ELASTIC 4 IN X 5 YDS - LATEX FREE(10/BX 5BX/CA)

## (undated) DEVICE — WIRE STEEL 5-0 B&S 20 OHS - 5/PK 12PK/BX ITEM. D5329 OR D6625 CAN BE USED AS A SUB

## (undated) DEVICE — SUTURE 5-0 PROLENE C-1 D/A 24 (36PK/BX)"

## (undated) DEVICE — SODIUM CHL IRRIGATION 0.9% 1000ML (12EA/CA)

## (undated) DEVICE — MICRODRIP PRIMARY VENTED 60 (48EA/CA) THIS WAS PART #2C8428 WHICH WAS DISCONTINUED

## (undated) DEVICE — GLOVE BIOGEL PI INDICATOR SZ 8.5 SURGICAL PF LF - (50PR/BX 4BX/CA)

## (undated) DEVICE — SET FLUID WARMING STANDARD FLOW - (10/CA)

## (undated) DEVICE — CHLORAPREP 26 ML APPLICATOR - ORANGE TINT(25/CA)

## (undated) DEVICE — CANISTER SUCTION 3000ML MECHANICAL FILTER AUTO SHUTOFF MEDI-VAC NONSTERILE LF DISP  (40EA/CA)

## (undated) DEVICE — DEVICE KIT COR-KNOT COMBO2 DEVICES & 12 KNOTS PER KIT (6KT/CA)

## (undated) DEVICE — GLOVE BIOGEL SZ 7.5 SURGICAL PF LTX - (50PR/BX 4BX/CA)

## (undated) DEVICE — SUTURE NONABSORBABLE ETHIBOND EXCEL 2-0 V-5 2 ARM BRAID GREEN WHITE (6EA/BX)

## (undated) DEVICE — BLADE STERNUM SAW SURGICAL 32.0 X 6.4 MM STERILE (1/EA)